# Patient Record
Sex: MALE | Race: WHITE | NOT HISPANIC OR LATINO | URBAN - METROPOLITAN AREA
[De-identification: names, ages, dates, MRNs, and addresses within clinical notes are randomized per-mention and may not be internally consistent; named-entity substitution may affect disease eponyms.]

---

## 2017-11-01 ENCOUNTER — EMERGENCY (EMERGENCY)
Facility: HOSPITAL | Age: 32
LOS: 1 days | Discharge: OTHER INSTITUTION | End: 2017-11-01
Attending: EMERGENCY MEDICINE | Admitting: EMERGENCY MEDICINE
Payer: MEDICARE

## 2017-11-01 VITALS
RESPIRATION RATE: 14 BRPM | SYSTOLIC BLOOD PRESSURE: 118 MMHG | HEART RATE: 77 BPM | TEMPERATURE: 97 F | WEIGHT: 190.04 LBS | OXYGEN SATURATION: 97 % | DIASTOLIC BLOOD PRESSURE: 82 MMHG

## 2017-11-01 PROCEDURE — 73610 X-RAY EXAM OF ANKLE: CPT | Mod: 26,RT

## 2017-11-01 PROCEDURE — 29515 APPLICATION SHORT LEG SPLINT: CPT

## 2017-11-01 PROCEDURE — 99284 EMERGENCY DEPT VISIT MOD MDM: CPT | Mod: 25

## 2017-11-01 PROCEDURE — 73620 X-RAY EXAM OF FOOT: CPT | Mod: 26,RT

## 2017-11-01 PROCEDURE — 29515 APPLICATION SHORT LEG SPLINT: CPT | Mod: RT

## 2017-11-01 PROCEDURE — 73620 X-RAY EXAM OF FOOT: CPT

## 2017-11-01 PROCEDURE — 73610 X-RAY EXAM OF ANKLE: CPT

## 2017-11-01 RX ORDER — ARIPIPRAZOLE 15 MG/1
1 TABLET ORAL
Qty: 0 | Refills: 0 | COMMUNITY

## 2017-11-01 RX ORDER — CLOZAPINE 150 MG/1
1.5 TABLET, ORALLY DISINTEGRATING ORAL
Qty: 0 | Refills: 0 | COMMUNITY

## 2017-11-01 NOTE — ED PROVIDER NOTE - OBJECTIVE STATEMENT
Pt is a 30 yo mrcp, bipolar from [facility],  per aid patient twisted right ankle yesterday while trick or treating.  pt was walking well yesterday, but today noted to be limping and with swollen foot and ankle.  pt otherwise acting normally.

## 2017-11-01 NOTE — ED ADULT NURSE NOTE - PMH
Anxiety    Bipolar 1 disorder    MR (mental retardation)  with autistic features  Psychological disorder

## 2017-11-01 NOTE — ED ADULT NURSE NOTE - OBJECTIVE STATEMENT
pt to er with swelling to right ankle and foot slight redness noted ice applied is awake and alert hx mr aide at bedsiae

## 2017-11-01 NOTE — ED PROVIDER NOTE - MEDICAL DECISION MAKING DETAILS
foot and ankle swelling and pain on ambulation sp inversion yesterday.  check xrays foot and ankle swelling and pain on ambulation sp inversion yesterday.  check xrays--no fx, splint, d/c ice, elevate fu ortho, wb as tolerated

## 2017-11-01 NOTE — ED PROVIDER NOTE - MUSCULOSKELETAL, MLM
Spine appears normal, range of motion is not limited, ttp right lateral malleolus with swelling and ttp diffusely around ankle, foot nt, sm intact, 2+ pulses, knee nt

## 2017-11-03 ENCOUNTER — APPOINTMENT (OUTPATIENT)
Dept: ORTHOPEDIC SURGERY | Facility: CLINIC | Age: 32
End: 2017-11-03

## 2017-11-03 ENCOUNTER — APPOINTMENT (OUTPATIENT)
Dept: RADIOLOGY | Facility: CLINIC | Age: 32
End: 2017-11-03

## 2017-11-06 DIAGNOSIS — F31.9 BIPOLAR DISORDER, UNSPECIFIED: ICD-10-CM

## 2017-11-06 DIAGNOSIS — S93.401A SPRAIN OF UNSPECIFIED LIGAMENT OF RIGHT ANKLE, INITIAL ENCOUNTER: ICD-10-CM
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
## 2017-11-06 DIAGNOSIS — G80.9 CEREBRAL PALSY, UNSPECIFIED: ICD-10-CM
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
## 2017-11-06 DIAGNOSIS — M25.571 PAIN IN RIGHT ANKLE AND JOINTS OF RIGHT FOOT: ICD-10-CM
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
## 2017-11-06 DIAGNOSIS — F79 UNSPECIFIED INTELLECTUAL DISABILITIES: ICD-10-CM

## 2017-11-07 ENCOUNTER — EMERGENCY (EMERGENCY)
Facility: HOSPITAL | Age: 32
LOS: 1 days | Discharge: HOME | End: 2017-11-07
Attending: EMERGENCY MEDICINE | Admitting: EMERGENCY MEDICINE
Payer: MEDICARE

## 2017-11-07 VITALS
DIASTOLIC BLOOD PRESSURE: 78 MMHG | SYSTOLIC BLOOD PRESSURE: 118 MMHG | RESPIRATION RATE: 16 BRPM | HEART RATE: 86 BPM | HEIGHT: 70 IN | OXYGEN SATURATION: 98 % | WEIGHT: 190.04 LBS | TEMPERATURE: 97 F

## 2017-11-07 DIAGNOSIS — S80.11XD CONTUSION OF RIGHT LOWER LEG, SUBSEQUENT ENCOUNTER: ICD-10-CM
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
## 2017-11-07 DIAGNOSIS — S99.911D UNSPECIFIED INJURY OF RIGHT ANKLE, SUBSEQUENT ENCOUNTER: ICD-10-CM

## 2017-11-07 DIAGNOSIS — S99.911A UNSPECIFIED INJURY OF RIGHT ANKLE, INITIAL ENCOUNTER: ICD-10-CM

## 2017-11-07 DIAGNOSIS — S80.829A BLISTER (NONTHERMAL), UNSPECIFIED LOWER LEG, INITIAL ENCOUNTER: ICD-10-CM
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
## 2017-11-07 PROCEDURE — 73630 X-RAY EXAM OF FOOT: CPT

## 2017-11-07 PROCEDURE — 73590 X-RAY EXAM OF LOWER LEG: CPT | Mod: 26,RT

## 2017-11-07 PROCEDURE — 73610 X-RAY EXAM OF ANKLE: CPT | Mod: 26,RT

## 2017-11-07 PROCEDURE — 96372 THER/PROPH/DIAG INJ SC/IM: CPT | Mod: 59

## 2017-11-07 PROCEDURE — 93971 EXTREMITY STUDY: CPT

## 2017-11-07 PROCEDURE — 73590 X-RAY EXAM OF LOWER LEG: CPT

## 2017-11-07 PROCEDURE — 99284 EMERGENCY DEPT VISIT MOD MDM: CPT | Mod: 25

## 2017-11-07 PROCEDURE — 73610 X-RAY EXAM OF ANKLE: CPT

## 2017-11-07 PROCEDURE — 29515 APPLICATION SHORT LEG SPLINT: CPT | Mod: RT

## 2017-11-07 PROCEDURE — 73630 X-RAY EXAM OF FOOT: CPT | Mod: 26,RT

## 2017-11-07 PROCEDURE — 93971 EXTREMITY STUDY: CPT | Mod: 26,RT

## 2017-11-07 PROCEDURE — 99284 EMERGENCY DEPT VISIT MOD MDM: CPT

## 2017-11-07 RX ORDER — MUPIROCIN 20 MG/G
1 OINTMENT TOPICAL
Qty: 1 | Refills: 0 | OUTPATIENT
Start: 2017-11-07 | End: 2017-11-17

## 2017-11-07 RX ORDER — PIPERACILLIN AND TAZOBACTAM 4; .5 G/20ML; G/20ML
3.38 INJECTION, POWDER, LYOPHILIZED, FOR SOLUTION INTRAVENOUS ONCE
Qty: 0 | Refills: 0 | Status: DISCONTINUED | OUTPATIENT
Start: 2017-11-07 | End: 2017-11-07

## 2017-11-07 RX ORDER — MUPIROCIN 20 MG/G
1 OINTMENT TOPICAL ONCE
Qty: 0 | Refills: 0 | Status: DISCONTINUED | OUTPATIENT
Start: 2017-11-07 | End: 2017-11-11

## 2017-11-07 RX ADMIN — Medication 2 MILLIGRAM(S): at 16:44

## 2017-11-07 RX ADMIN — Medication 1 TABLET(S): at 18:38

## 2017-11-07 RX ADMIN — PIPERACILLIN AND TAZOBACTAM 200 GRAM(S): 4; .5 INJECTION, POWDER, LYOPHILIZED, FOR SOLUTION INTRAVENOUS at 15:13

## 2017-11-07 NOTE — ED PROVIDER NOTE - SECONDARY DIAGNOSIS.
Contusion of lower extremity, right, subsequent encounter Blister (nonthermal), unspecified lower leg, initial encounter

## 2017-11-07 NOTE — ED PROVIDER NOTE - SKIN WOUND DESCRIPTION
SWOLLEN/clean/right anterior distal closed blister, with distal area of excoriation erythema, and small open blister

## 2017-11-07 NOTE — ED PROVIDER NOTE - OBJECTIVE STATEMENT
30 yo male ACLD, brought to ed by aide , states on halloween night, patient sustained, a right ankle injury, was seen in ed no fx on xrays, herbie arnold was sent to ed today because rle is more swollen and now has blisters with redness.  patient is behaving at baseline.  PMD Dr Emmanuel

## 2017-11-07 NOTE — ED PROVIDER NOTE - CARE PLAN
Principal Discharge DX:	Lower extremity edema  Secondary Diagnosis:	Contusion of lower extremity, right, subsequent encounter  Secondary Diagnosis:	Blister (nonthermal), unspecified lower leg, initial encounter

## 2017-11-07 NOTE — ED PROVIDER NOTE - UNABLE TO OBTAIN
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

## 2017-11-07 NOTE — ED PROVIDER NOTE - ATTENDING CONTRIBUTION TO CARE
30 yo male from Group home with MR seen in ER 11/01/17 for sprained ankle. Returns with group home staff today for eval of blisters and swelling of Rt ankle since yesterday. Staff say pt has been running on ankle and not resting. Had Ace wrap on it until today. Pt unable to give any hx. PE reveals moderate swelling and tenderness Rt ankle with 2 blisters on ant aspect of ankle likely from tight bandage abrasions. No cellulitis. Plan - Venous doppler, CBC, re-xray ankle.    I performed a history and physical exam of the patient and discussed their management with the advanced care provider. I reviewed the advanced care provider's note and agree with the documented findings and plan of care. My medical decision making and objective findings are found above.

## 2017-11-07 NOTE — ED PROVIDER NOTE - PROGRESS NOTE DETAILS
patient could not tolerate bw, IV placement, splint.  advised aide patient to follow up with ortho and pmd, rx for bactroban and augmentin sent to pharmacy, copy of results given

## 2017-11-10 NOTE — ED PROCEDURE NOTE - NS ED PERI VASCULAR NEG
fingers/toes warm to touch/no cyanosis of extremity/no paresthesia/capillary refill time < 2 seconds

## 2017-11-10 NOTE — ED PROCEDURE NOTE - CPROC ED POST PROC CARE GUIDE1
Elevate the injured extremity as instructed./Verbal/written post procedure instructions were given to patient/caregiver./Instructed patient/caregiver to follow-up with primary care physician./Keep the cast/splint/dressing clean and dry.

## 2017-11-10 NOTE — ED PROCEDURE NOTE - ATTENDING CONTRIBUTION TO CARE
Ankle swelling from prior injury. No acute fracture. Post splint applied.    I performed a history and physical exam of the patient and discussed their management with the advanced care provider. I reviewed the advanced care provider's note and agree with the documented findings and plan of care. My medical decision making and objective findings are found above. Rt Ankle swelling from prior injury. moderate swelling of rt ankle and foot. Rexrayed and venous doppler performed. No DVT. No acute fracture. Post splint applied.    I performed a history and physical exam of the patient and discussed their management with the advanced care provider. I reviewed the advanced care provider's note and agree with the documented findings and plan of care. My medical decision making and objective findings are found above.

## 2017-11-13 ENCOUNTER — APPOINTMENT (OUTPATIENT)
Dept: ORTHOPEDIC SURGERY | Facility: CLINIC | Age: 32
End: 2017-11-13
Payer: MEDICARE

## 2017-11-13 VITALS — HEART RATE: 72 BPM | DIASTOLIC BLOOD PRESSURE: 78 MMHG | TEMPERATURE: 97.7 F | SYSTOLIC BLOOD PRESSURE: 122 MMHG

## 2017-11-13 DIAGNOSIS — Z78.9 OTHER SPECIFIED HEALTH STATUS: ICD-10-CM

## 2017-11-13 PROCEDURE — 97760 ORTHOTIC MGMT&TRAING 1ST ENC: CPT | Mod: GP

## 2017-11-13 PROCEDURE — 99204 OFFICE O/P NEW MOD 45 MIN: CPT | Mod: 25

## 2017-11-13 RX ORDER — MUPIROCIN 2 G/100G
2 CREAM TOPICAL
Qty: 15 | Refills: 0 | Status: ACTIVE | COMMUNITY
Start: 2017-11-08

## 2017-11-13 RX ORDER — ARIPIPRAZOLE 20 MG/1
20 TABLET ORAL
Qty: 30 | Refills: 0 | Status: ACTIVE | COMMUNITY
Start: 2017-07-28

## 2017-11-13 RX ORDER — HYDROCORTISONE 1 %
12 CREAM (GRAM) TOPICAL
Qty: 400 | Refills: 0 | Status: ACTIVE | COMMUNITY
Start: 2017-09-17

## 2017-11-13 RX ORDER — CHLORHEXIDINE GLUCONATE, 0.12% ORAL RINSE 1.2 MG/ML
0.12 SOLUTION DENTAL
Qty: 473 | Refills: 0 | Status: ACTIVE | COMMUNITY
Start: 2017-09-20

## 2017-11-13 RX ORDER — AMOXICILLIN AND CLAVULANATE POTASSIUM 875; 125 MG/1; MG/1
875-125 TABLET, COATED ORAL
Qty: 20 | Refills: 0 | Status: ACTIVE | COMMUNITY
Start: 2017-11-08

## 2017-11-13 RX ORDER — CLOZAPINE 100 MG/1
100 TABLET ORAL
Qty: 56 | Refills: 0 | Status: ACTIVE | COMMUNITY
Start: 2017-07-17

## 2017-11-13 RX ORDER — NYSTATIN 100000 [USP'U]/G
100000 CREAM TOPICAL
Qty: 30 | Refills: 0 | Status: ACTIVE | COMMUNITY
Start: 2017-05-30

## 2017-11-13 RX ORDER — CLOZAPINE 25 MG/1
25 TABLET ORAL
Qty: 28 | Refills: 0 | Status: ACTIVE | COMMUNITY
Start: 2017-05-12

## 2017-11-13 RX ORDER — RISPERIDONE 3 MG/1
3 TABLET, FILM COATED ORAL
Qty: 60 | Refills: 0 | Status: ACTIVE | COMMUNITY
Start: 2017-07-03

## 2017-11-13 RX ORDER — KETOCONAZOLE 20 MG/G
2 CREAM TOPICAL
Qty: 30 | Refills: 0 | Status: ACTIVE | COMMUNITY
Start: 2017-05-11

## 2017-11-13 RX ORDER — KETOCONAZOLE 20.5 MG/ML
2 SHAMPOO, SUSPENSION TOPICAL
Qty: 120 | Refills: 0 | Status: ACTIVE | COMMUNITY
Start: 2017-05-11

## 2017-11-13 RX ORDER — BENZTROPINE MESYLATE 2 MG/1
2 TABLET ORAL
Qty: 60 | Refills: 0 | Status: ACTIVE | COMMUNITY
Start: 2017-07-28

## 2017-11-13 RX ORDER — MUPIROCIN 20 MG/G
2 OINTMENT TOPICAL
Qty: 22 | Refills: 0 | Status: ACTIVE | COMMUNITY
Start: 2017-06-08

## 2017-11-13 RX ORDER — DESMOPRESSIN ACETATE 0.2 MG/1
0.2 TABLET ORAL
Qty: 7 | Refills: 0 | Status: ACTIVE | COMMUNITY
Start: 2017-06-15

## 2017-11-13 RX ORDER — TOBRAMYCIN 3 MG/ML
0.3 SOLUTION/ DROPS OPHTHALMIC
Qty: 5 | Refills: 0 | Status: ACTIVE | COMMUNITY
Start: 2017-06-25

## 2017-11-13 RX ORDER — DIVALPROEX SODIUM 500 1/1
500 TABLET, EXTENDED RELEASE ORAL
Qty: 150 | Refills: 0 | Status: ACTIVE | COMMUNITY
Start: 2017-05-13

## 2017-11-14 ENCOUNTER — RX RENEWAL (OUTPATIENT)
Age: 32
End: 2017-11-14

## 2017-11-14 RX ORDER — BACITRACIN ZINC AND POLYMYXIN B SULFATE 500; 10000 [USP'U]/G; [USP'U]/G
500-10000 OINTMENT OPHTHALMIC
Qty: 1 | Refills: 3 | Status: ACTIVE | COMMUNITY
Start: 2017-11-14 | End: 1900-01-01

## 2017-12-18 ENCOUNTER — APPOINTMENT (OUTPATIENT)
Dept: ORTHOPEDIC SURGERY | Facility: CLINIC | Age: 32
End: 2017-12-18
Payer: MEDICARE

## 2017-12-18 DIAGNOSIS — S91.001A UNSPECIFIED OPEN WOUND, RIGHT ANKLE, INITIAL ENCOUNTER: ICD-10-CM

## 2017-12-18 DIAGNOSIS — M25.571 PAIN IN RIGHT ANKLE AND JOINTS OF RIGHT FOOT: ICD-10-CM

## 2017-12-18 PROCEDURE — 73610 X-RAY EXAM OF ANKLE: CPT | Mod: RT

## 2017-12-18 PROCEDURE — 99213 OFFICE O/P EST LOW 20 MIN: CPT

## 2020-06-15 ENCOUNTER — TRANSCRIPTION ENCOUNTER (OUTPATIENT)
Age: 35
End: 2020-06-15

## 2020-06-15 ENCOUNTER — APPOINTMENT (OUTPATIENT)
Dept: ULTRASOUND IMAGING | Facility: CLINIC | Age: 35
End: 2020-06-15
Payer: MEDICARE

## 2020-06-15 ENCOUNTER — OUTPATIENT (OUTPATIENT)
Dept: OUTPATIENT SERVICES | Facility: HOSPITAL | Age: 35
LOS: 1 days | End: 2020-06-15
Payer: MEDICARE

## 2020-06-15 DIAGNOSIS — M79.89 OTHER SPECIFIED SOFT TISSUE DISORDERS: ICD-10-CM

## 2020-06-15 PROCEDURE — 93971 EXTREMITY STUDY: CPT

## 2020-06-15 PROCEDURE — 93971 EXTREMITY STUDY: CPT | Mod: 26

## 2020-06-18 ENCOUNTER — APPOINTMENT (OUTPATIENT)
Dept: ORTHOPEDIC SURGERY | Facility: CLINIC | Age: 35
End: 2020-06-18

## 2020-06-22 ENCOUNTER — APPOINTMENT (OUTPATIENT)
Dept: ORTHOPEDIC SURGERY | Facility: CLINIC | Age: 35
End: 2020-06-22
Payer: MEDICARE

## 2020-06-22 DIAGNOSIS — S99.912A UNSPECIFIED INJURY OF LEFT ANKLE, INITIAL ENCOUNTER: ICD-10-CM

## 2020-06-22 PROCEDURE — 99214 OFFICE O/P EST MOD 30 MIN: CPT

## 2020-06-22 PROCEDURE — 73610 X-RAY EXAM OF ANKLE: CPT | Mod: LT

## 2020-06-22 RX ORDER — RISPERIDONE 1 MG/1
1 TABLET, FILM COATED ORAL
Qty: 60 | Refills: 0 | Status: ACTIVE | COMMUNITY
Start: 2020-04-11

## 2020-06-22 RX ORDER — CLOZAPINE 50 MG/1
50 TABLET ORAL
Qty: 60 | Refills: 0 | Status: ACTIVE | COMMUNITY
Start: 2020-06-12

## 2020-06-22 RX ORDER — CLOZAPINE 200 MG/1
200 TABLET ORAL
Qty: 60 | Refills: 0 | Status: ACTIVE | COMMUNITY
Start: 2020-06-12

## 2020-06-22 RX ORDER — CLONAZEPAM 0.5 MG/1
0.5 TABLET ORAL
Qty: 90 | Refills: 0 | Status: ACTIVE | COMMUNITY
Start: 2020-05-20

## 2020-06-22 NOTE — HISTORY OF PRESENT ILLNESS
[FreeTextEntry1] : Pt is 34M with two aides from the group home. One aide states he twisted his left ankle (new injury) approximately two weeks ago.  He has been walking on the ankle since the injury,  It is a witnessed injury. He was seen at Urgent care where xrays were taken and found to be negative.  He has been treated with elevation and ice.  No NSAIDs.  The patient is not complaining of pain. Not no fault related or work related injury.

## 2020-06-22 NOTE — PHYSICAL EXAM
[de-identified] : Left Ankle Physical Examination:\par \par General: Awake.  In no acute distress.  Pleasant in nature with a normal affect.  No apparent respiratory distress. \par Erythema, Warmth, Rubor: Negative\par Swelling: Positive swelling laterally.\par \par ROM:\par Limited due to pain.\par \par Tenderness to Palpation: \par 1. Lateral Malleolus: Negative\par 2. Medial Malleolus: Negative\par 3. Proximal Fibular Pain: Negative\par 4. Heel Pain: Negative\par 5. Cuboid: Negative\par 6. Navicular: Negative\par 7. Tibiotalar Joint: Negative\par 8. Subtalar Joint: Negative\par 9. Posterior Recess: Negative\par \par Tendon Pain:\par 1. Achilles: Negative\par 2. Peroneals: Negative\par 3. Posterior Tibialis: Negative\par 4. Tibialis Anterior: Negative\par \par Ligament Pain:\par 1. ATFL: Positive\par 2. CFL: Negative \par 3. PTFL: Negative\par 4. Deltoid Ligaments: Negative\par 5. Lis Franc Ligament: Negative\par \par Stability: \par 1. Anterior Drawer: Negative\par 2. Posterior Drawer: Negative\par \par Strength: 5/5 TA/GS/EHL\par \par Pulses: 2+ DP/PT Pulses\par \par Neuro: Intact motor and sensory\par \par Additional Test:\par 1. Calcaneal Squeeze Test: Negative\par 2. Syndesmosis Squeeze Test: Negative\par  [de-identified] : X-rays of the left ankle taken in office today and reviewed with the patient showed: No fractures identified.

## 2020-06-22 NOTE — DISCUSSION/SUMMARY
[de-identified] : At this point in time there are no fractures of the left ankle. He can return to the group home. He can weight-bear as tolerated. She will continue to ice and elevate. He can followup as needed.\par \par Able to return to program.  Ambulate as tolerated. WBAT\par F/U 6-8 weeks PRN. NSAIDS PRN

## 2020-06-22 NOTE — DISCUSSION/SUMMARY
[de-identified] : At this point in time there are no fractures of the left ankle. He can return to the group home. He can weight-bear as tolerated. She will continue to ice and elevate. He can followup as needed.\par \par Able to return to program.  Ambulate as tolerated. WBAT\par F/U 6-8 weeks PRN. NSAIDS PRN

## 2020-06-22 NOTE — PHYSICAL EXAM
[de-identified] : Left Ankle Physical Examination:\par \par General: Awake.  In no acute distress.  Pleasant in nature with a normal affect.  No apparent respiratory distress. \par Erythema, Warmth, Rubor: Negative\par Swelling: Positive swelling laterally.\par \par ROM:\par Limited due to pain.\par \par Tenderness to Palpation: \par 1. Lateral Malleolus: Negative\par 2. Medial Malleolus: Negative\par 3. Proximal Fibular Pain: Negative\par 4. Heel Pain: Negative\par 5. Cuboid: Negative\par 6. Navicular: Negative\par 7. Tibiotalar Joint: Negative\par 8. Subtalar Joint: Negative\par 9. Posterior Recess: Negative\par \par Tendon Pain:\par 1. Achilles: Negative\par 2. Peroneals: Negative\par 3. Posterior Tibialis: Negative\par 4. Tibialis Anterior: Negative\par \par Ligament Pain:\par 1. ATFL: Positive\par 2. CFL: Negative \par 3. PTFL: Negative\par 4. Deltoid Ligaments: Negative\par 5. Lis Franc Ligament: Negative\par \par Stability: \par 1. Anterior Drawer: Negative\par 2. Posterior Drawer: Negative\par \par Strength: 5/5 TA/GS/EHL\par \par Pulses: 2+ DP/PT Pulses\par \par Neuro: Intact motor and sensory\par \par Additional Test:\par 1. Calcaneal Squeeze Test: Negative\par 2. Syndesmosis Squeeze Test: Negative\par  [de-identified] : X-rays of the left ankle taken in office today and reviewed with the patient showed: No fractures identified.

## 2021-02-10 ENCOUNTER — EMERGENCY (EMERGENCY)
Facility: HOSPITAL | Age: 36
LOS: 1 days | Discharge: HOME | End: 2021-02-10
Attending: STUDENT IN AN ORGANIZED HEALTH CARE EDUCATION/TRAINING PROGRAM | Admitting: STUDENT IN AN ORGANIZED HEALTH CARE EDUCATION/TRAINING PROGRAM
Payer: MEDICARE

## 2021-02-10 VITALS
HEART RATE: 99 BPM | SYSTOLIC BLOOD PRESSURE: 130 MMHG | RESPIRATION RATE: 16 BRPM | TEMPERATURE: 98 F | WEIGHT: 156.09 LBS | OXYGEN SATURATION: 100 % | HEIGHT: 70 IN | DIASTOLIC BLOOD PRESSURE: 89 MMHG

## 2021-02-10 VITALS — OXYGEN SATURATION: 96 % | HEART RATE: 78 BPM | RESPIRATION RATE: 16 BRPM

## 2021-02-10 LAB
ALBUMIN SERPL ELPH-MCNC: 3.4 G/DL — SIGNIFICANT CHANGE UP (ref 3.3–5)
ALP SERPL-CCNC: 77 U/L — SIGNIFICANT CHANGE UP (ref 40–120)
ALT FLD-CCNC: 17 U/L — SIGNIFICANT CHANGE UP (ref 12–78)
ANION GAP SERPL CALC-SCNC: 4 MMOL/L — LOW (ref 5–17)
AST SERPL-CCNC: 18 U/L — SIGNIFICANT CHANGE UP (ref 15–37)
BASOPHILS # BLD AUTO: 0 K/UL — SIGNIFICANT CHANGE UP (ref 0–0.2)
BASOPHILS NFR BLD AUTO: 0 % — SIGNIFICANT CHANGE UP (ref 0–2)
BILIRUB SERPL-MCNC: 0.3 MG/DL — SIGNIFICANT CHANGE UP (ref 0.2–1.2)
BUN SERPL-MCNC: 20 MG/DL — SIGNIFICANT CHANGE UP (ref 7–23)
CALCIUM SERPL-MCNC: 9.2 MG/DL — SIGNIFICANT CHANGE UP (ref 8.5–10.1)
CHLORIDE SERPL-SCNC: 107 MMOL/L — SIGNIFICANT CHANGE UP (ref 96–108)
CO2 SERPL-SCNC: 34 MMOL/L — HIGH (ref 22–31)
CREAT SERPL-MCNC: 0.75 MG/DL — SIGNIFICANT CHANGE UP (ref 0.5–1.3)
EOSINOPHIL # BLD AUTO: 0 K/UL — SIGNIFICANT CHANGE UP (ref 0–0.5)
EOSINOPHIL NFR BLD AUTO: 0 % — SIGNIFICANT CHANGE UP (ref 0–6)
GLUCOSE SERPL-MCNC: 92 MG/DL — SIGNIFICANT CHANGE UP (ref 70–99)
HCT VFR BLD CALC: 42.1 % — SIGNIFICANT CHANGE UP (ref 39–50)
HGB BLD-MCNC: 13.3 G/DL — SIGNIFICANT CHANGE UP (ref 13–17)
LYMPHOCYTES # BLD AUTO: 1.39 K/UL — SIGNIFICANT CHANGE UP (ref 1–3.3)
LYMPHOCYTES # BLD AUTO: 25 % — SIGNIFICANT CHANGE UP (ref 13–44)
MAGNESIUM SERPL-MCNC: 2.1 MG/DL — SIGNIFICANT CHANGE UP (ref 1.6–2.6)
MANUAL SMEAR VERIFICATION: SIGNIFICANT CHANGE UP
MCHC RBC-ENTMCNC: 31.4 PG — SIGNIFICANT CHANGE UP (ref 27–34)
MCHC RBC-ENTMCNC: 31.6 GM/DL — LOW (ref 32–36)
MCV RBC AUTO: 99.3 FL — SIGNIFICANT CHANGE UP (ref 80–100)
METAMYELOCYTES # FLD: 1 % — HIGH (ref 0–0)
MONOCYTES # BLD AUTO: 0.67 K/UL — SIGNIFICANT CHANGE UP (ref 0–0.9)
MONOCYTES NFR BLD AUTO: 12 % — SIGNIFICANT CHANGE UP (ref 2–14)
MYELOCYTES NFR BLD: 2 % — HIGH (ref 0–0)
NEUTROPHILS # BLD AUTO: 2.84 K/UL — SIGNIFICANT CHANGE UP (ref 1.8–7.4)
NEUTROPHILS NFR BLD AUTO: 49 % — SIGNIFICANT CHANGE UP (ref 43–77)
NEUTS BAND # BLD: 2 % — SIGNIFICANT CHANGE UP (ref 0–8)
NRBC # BLD: 0 — SIGNIFICANT CHANGE UP
NRBC # BLD: SIGNIFICANT CHANGE UP /100 WBCS (ref 0–0)
PLAT MORPH BLD: NORMAL — SIGNIFICANT CHANGE UP
PLATELET # BLD AUTO: 169 K/UL — SIGNIFICANT CHANGE UP (ref 150–400)
POTASSIUM SERPL-MCNC: 4.6 MMOL/L — SIGNIFICANT CHANGE UP (ref 3.5–5.3)
POTASSIUM SERPL-SCNC: 4.6 MMOL/L — SIGNIFICANT CHANGE UP (ref 3.5–5.3)
PROT SERPL-MCNC: 6.9 G/DL — SIGNIFICANT CHANGE UP (ref 6–8.3)
RBC # BLD: 4.24 M/UL — SIGNIFICANT CHANGE UP (ref 4.2–5.8)
RBC # FLD: 13.5 % — SIGNIFICANT CHANGE UP (ref 10.3–14.5)
RBC BLD AUTO: NORMAL — SIGNIFICANT CHANGE UP
SODIUM SERPL-SCNC: 145 MMOL/L — SIGNIFICANT CHANGE UP (ref 135–145)
VARIANT LYMPHS # BLD: 9 % — HIGH (ref 0–6)
WBC # BLD: 5.56 K/UL — SIGNIFICANT CHANGE UP (ref 3.8–10.5)
WBC # FLD AUTO: 5.56 K/UL — SIGNIFICANT CHANGE UP (ref 3.8–10.5)

## 2021-02-10 PROCEDURE — 36415 COLL VENOUS BLD VENIPUNCTURE: CPT

## 2021-02-10 PROCEDURE — 99284 EMERGENCY DEPT VISIT MOD MDM: CPT

## 2021-02-10 PROCEDURE — 71045 X-RAY EXAM CHEST 1 VIEW: CPT

## 2021-02-10 PROCEDURE — 80053 COMPREHEN METABOLIC PANEL: CPT

## 2021-02-10 PROCEDURE — 71045 X-RAY EXAM CHEST 1 VIEW: CPT | Mod: 26

## 2021-02-10 PROCEDURE — 83735 ASSAY OF MAGNESIUM: CPT

## 2021-02-10 PROCEDURE — 99284 EMERGENCY DEPT VISIT MOD MDM: CPT | Mod: 25

## 2021-02-10 PROCEDURE — 85025 COMPLETE CBC W/AUTO DIFF WBC: CPT

## 2021-02-10 NOTE — ED PROVIDER NOTE - PHYSICAL EXAMINATION
Vital signs as available reviewed.  General:  Comfortable, no acute distress.   Head:  Normocephalic, atraumatic.  Eyes:  Conjunctiva pink, no icterus. Pupils equal, round, reactive to light.  Cardiovascular:  Regular rate, no obvious murmur.  Respiratory:  Clear to auscultation, good air entry bilaterally.  Abdomen:  Soft, non-tender.  Musculoskeletal:  No deformity or calf tenderness.  Neurologic: Asleep.  Skin:  Warm and dry.

## 2021-02-10 NOTE — ED PROVIDER NOTE - OBJECTIVE STATEMENT
35 M PMH /Tyler sent from group home for decreased mental status. Had COVID vaccine today and was agitated, then had his Klonopin 1mg AM dose and became sleepy. Per aid at bedside this is normal for him after taking his medication but the staff there was uncertain as he did not wake up and sent him for evaluation. patient unable to participate with interview.

## 2021-02-10 NOTE — ED PROVIDER NOTE - CLINICAL SUMMARY MEDICAL DECISION MAKING FREE TEXT BOX
Here with decreased mental status- likely medication side effect. Now at baseline per aids ad bedside, will DC.

## 2021-02-10 NOTE — ED PROVIDER NOTE - PATIENT PORTAL LINK FT
You can access the FollowMyHealth Patient Portal offered by [facility] by registering at the following website: [email or web address]. By joining [employer]’s FollowMyHealth portal, you will also be able to view your health information using other applications (apps) compatible with our system.

## 2021-02-10 NOTE — ED PROVIDER NOTE - PROGRESS NOTE DETAILS
patient more awake- both aids at bedside report he is at his baseline and that he frequently sleeps all day after getting his medications.

## 2021-02-10 NOTE — ED ADULT NURSE NOTE - OBJECTIVE STATEMENT
Received pt in bed sleepy.  BIBA from ACLD group home.  Pt was receiving 2 does of Moderna today.  caregiver at bedside stated he got his 8 AM meds and soon after went to sleep and was more lethargic then normal.  Denies fevers. Pt was sent to be assessed by ER.  Pt on monitor.  Pt sat 92%.  Ongoing nursing  and safety maintained.

## 2021-06-01 ENCOUNTER — EMERGENCY (EMERGENCY)
Facility: HOSPITAL | Age: 36
LOS: 1 days | Discharge: HOME | End: 2021-06-01
Attending: EMERGENCY MEDICINE
Payer: MEDICARE

## 2021-06-01 VITALS
TEMPERATURE: 98 F | HEART RATE: 103 BPM | OXYGEN SATURATION: 98 % | SYSTOLIC BLOOD PRESSURE: 111 MMHG | DIASTOLIC BLOOD PRESSURE: 80 MMHG | HEIGHT: 70 IN | RESPIRATION RATE: 20 BRPM | WEIGHT: 205.91 LBS

## 2021-06-01 LAB
ALBUMIN SERPL ELPH-MCNC: 4.2 G/DL — SIGNIFICANT CHANGE UP (ref 3.3–5.2)
ALP SERPL-CCNC: 87 U/L — SIGNIFICANT CHANGE UP (ref 40–120)
ALT FLD-CCNC: 18 U/L — SIGNIFICANT CHANGE UP
ANION GAP SERPL CALC-SCNC: 8 MMOL/L — SIGNIFICANT CHANGE UP (ref 5–17)
APPEARANCE UR: CLEAR — SIGNIFICANT CHANGE UP
AST SERPL-CCNC: 30 U/L — SIGNIFICANT CHANGE UP
BASOPHILS # BLD AUTO: 0.03 K/UL — SIGNIFICANT CHANGE UP (ref 0–0.2)
BASOPHILS NFR BLD AUTO: 0.6 % — SIGNIFICANT CHANGE UP (ref 0–2)
BILIRUB SERPL-MCNC: 0.2 MG/DL — LOW (ref 0.4–2)
BILIRUB UR-MCNC: NEGATIVE — SIGNIFICANT CHANGE UP
BUN SERPL-MCNC: 21.6 MG/DL — HIGH (ref 8–20)
CALCIUM SERPL-MCNC: 9.6 MG/DL — SIGNIFICANT CHANGE UP (ref 8.6–10.2)
CHLORIDE SERPL-SCNC: 98 MMOL/L — SIGNIFICANT CHANGE UP (ref 98–107)
CO2 SERPL-SCNC: 34 MMOL/L — HIGH (ref 22–29)
COLOR SPEC: YELLOW — SIGNIFICANT CHANGE UP
CREAT SERPL-MCNC: 0.89 MG/DL — SIGNIFICANT CHANGE UP (ref 0.5–1.3)
DIFF PNL FLD: NEGATIVE — SIGNIFICANT CHANGE UP
EOSINOPHIL # BLD AUTO: 0.15 K/UL — SIGNIFICANT CHANGE UP (ref 0–0.5)
EOSINOPHIL NFR BLD AUTO: 2.9 % — SIGNIFICANT CHANGE UP (ref 0–6)
GLUCOSE SERPL-MCNC: 97 MG/DL — SIGNIFICANT CHANGE UP (ref 70–99)
GLUCOSE UR QL: NEGATIVE MG/DL — SIGNIFICANT CHANGE UP
HCT VFR BLD CALC: 47.8 % — SIGNIFICANT CHANGE UP (ref 39–50)
HGB BLD-MCNC: 15.3 G/DL — SIGNIFICANT CHANGE UP (ref 13–17)
IMM GRANULOCYTES NFR BLD AUTO: 1.4 % — SIGNIFICANT CHANGE UP (ref 0–1.5)
KETONES UR-MCNC: ABNORMAL
LEUKOCYTE ESTERASE UR-ACNC: NEGATIVE — SIGNIFICANT CHANGE UP
LIDOCAIN IGE QN: 21 U/L — LOW (ref 22–51)
LYMPHOCYTES # BLD AUTO: 1.79 K/UL — SIGNIFICANT CHANGE UP (ref 1–3.3)
LYMPHOCYTES # BLD AUTO: 34.8 % — SIGNIFICANT CHANGE UP (ref 13–44)
MCHC RBC-ENTMCNC: 32 GM/DL — SIGNIFICANT CHANGE UP (ref 32–36)
MCHC RBC-ENTMCNC: 32.2 PG — SIGNIFICANT CHANGE UP (ref 27–34)
MCV RBC AUTO: 100.6 FL — HIGH (ref 80–100)
MONOCYTES # BLD AUTO: 0.6 K/UL — SIGNIFICANT CHANGE UP (ref 0–0.9)
MONOCYTES NFR BLD AUTO: 11.7 % — SIGNIFICANT CHANGE UP (ref 2–14)
NEUTROPHILS # BLD AUTO: 2.51 K/UL — SIGNIFICANT CHANGE UP (ref 1.8–7.4)
NEUTROPHILS NFR BLD AUTO: 48.6 % — SIGNIFICANT CHANGE UP (ref 43–77)
NITRITE UR-MCNC: NEGATIVE — SIGNIFICANT CHANGE UP
PH UR: 7 — SIGNIFICANT CHANGE UP (ref 5–8)
PLATELET # BLD AUTO: 187 K/UL — SIGNIFICANT CHANGE UP (ref 150–400)
POTASSIUM SERPL-MCNC: 4.2 MMOL/L — SIGNIFICANT CHANGE UP (ref 3.5–5.3)
POTASSIUM SERPL-SCNC: 4.2 MMOL/L — SIGNIFICANT CHANGE UP (ref 3.5–5.3)
PROT SERPL-MCNC: 7.2 G/DL — SIGNIFICANT CHANGE UP (ref 6.6–8.7)
PROT UR-MCNC: NEGATIVE MG/DL — SIGNIFICANT CHANGE UP
RBC # BLD: 4.75 M/UL — SIGNIFICANT CHANGE UP (ref 4.2–5.8)
RBC # FLD: 14 % — SIGNIFICANT CHANGE UP (ref 10.3–14.5)
SODIUM SERPL-SCNC: 140 MMOL/L — SIGNIFICANT CHANGE UP (ref 135–145)
SP GR SPEC: 1.01 — SIGNIFICANT CHANGE UP (ref 1.01–1.02)
TROPONIN T SERPL-MCNC: <0.01 NG/ML — SIGNIFICANT CHANGE UP (ref 0–0.06)
UROBILINOGEN FLD QL: 1 MG/DL
VALPROATE SERPL-MCNC: 96.1 UG/ML — SIGNIFICANT CHANGE UP (ref 50–100)
WBC # BLD: 5.15 K/UL — SIGNIFICANT CHANGE UP (ref 3.8–10.5)
WBC # FLD AUTO: 5.15 K/UL — SIGNIFICANT CHANGE UP (ref 3.8–10.5)

## 2021-06-01 PROCEDURE — 71045 X-RAY EXAM CHEST 1 VIEW: CPT | Mod: 26

## 2021-06-01 PROCEDURE — 83690 ASSAY OF LIPASE: CPT

## 2021-06-01 PROCEDURE — 70450 CT HEAD/BRAIN W/O DYE: CPT

## 2021-06-01 PROCEDURE — 80053 COMPREHEN METABOLIC PANEL: CPT

## 2021-06-01 PROCEDURE — 99284 EMERGENCY DEPT VISIT MOD MDM: CPT

## 2021-06-01 PROCEDURE — 71045 X-RAY EXAM CHEST 1 VIEW: CPT

## 2021-06-01 PROCEDURE — 84484 ASSAY OF TROPONIN QUANT: CPT

## 2021-06-01 PROCEDURE — 81003 URINALYSIS AUTO W/O SCOPE: CPT

## 2021-06-01 PROCEDURE — 80164 ASSAY DIPROPYLACETIC ACD TOT: CPT

## 2021-06-01 PROCEDURE — 72192 CT PELVIS W/O DYE: CPT

## 2021-06-01 PROCEDURE — 72192 CT PELVIS W/O DYE: CPT | Mod: 26,MA

## 2021-06-01 PROCEDURE — 72125 CT NECK SPINE W/O DYE: CPT | Mod: 26,MH

## 2021-06-01 PROCEDURE — 99284 EMERGENCY DEPT VISIT MOD MDM: CPT | Mod: 25

## 2021-06-01 PROCEDURE — 72125 CT NECK SPINE W/O DYE: CPT

## 2021-06-01 PROCEDURE — 36415 COLL VENOUS BLD VENIPUNCTURE: CPT

## 2021-06-01 PROCEDURE — 85025 COMPLETE CBC W/AUTO DIFF WBC: CPT

## 2021-06-01 PROCEDURE — 70450 CT HEAD/BRAIN W/O DYE: CPT | Mod: 26,MH

## 2021-06-01 RX ORDER — SODIUM CHLORIDE 9 MG/ML
1000 INJECTION INTRAMUSCULAR; INTRAVENOUS; SUBCUTANEOUS ONCE
Refills: 0 | Status: COMPLETED | OUTPATIENT
Start: 2021-06-01 | End: 2021-06-01

## 2021-06-01 RX ADMIN — SODIUM CHLORIDE 1000 MILLILITER(S): 9 INJECTION INTRAMUSCULAR; INTRAVENOUS; SUBCUTANEOUS at 17:39

## 2021-06-01 RX ADMIN — SODIUM CHLORIDE 1000 MILLILITER(S): 9 INJECTION INTRAMUSCULAR; INTRAVENOUS; SUBCUTANEOUS at 18:27

## 2021-06-01 NOTE — ED ADULT NURSE REASSESSMENT NOTE - NS ED NURSE REASSESS COMMENT FT1
assumed care of pt, Pt in no apparent distress at this time. Airway patent, breathing spontaneous and nonlabored. Pt A&Ox1-2 resting in stretcher. Pt no complaints at this itme

## 2021-06-01 NOTE — ED PROVIDER NOTE - PATIENT PORTAL LINK FT
You can access the FollowMyHealth Patient Portal offered by [facility] by registering at the following website: [email or web address]. By joining [employer]’s FollowMyHealth portal, you will also be able to view your health information using other applications (apps) compatible with our system.

## 2021-06-01 NOTE — ED PROVIDER NOTE - CLINICAL SUMMARY MEDICAL DECISION MAKING FREE TEXT BOX
The patient presents with a fall and CT and lab WNL and will dc back to group home and follow up with PMD

## 2021-06-01 NOTE — ED ADULT NURSE NOTE - OBJECTIVE STATEMENT
Assumed care of pt at 4:49p. Assumed care of pt at 4:49p. Pt A&ox1 c/o "increased falls over the past week" as per staff at residential home. Pt unable to communicate effectively w/ RN. RN called on-call group home [facility]ANY Palma stating pt is typically able to hold conversation and oriented to person and place. MD Valdez called to bedside for priority CT evaluation. As per RN Louie, last known well "a few hours ago". GCS 13

## 2021-06-02 VITALS
OXYGEN SATURATION: 97 % | RESPIRATION RATE: 17 BRPM | SYSTOLIC BLOOD PRESSURE: 146 MMHG | HEART RATE: 91 BPM | TEMPERATURE: 98 F | DIASTOLIC BLOOD PRESSURE: 81 MMHG

## 2021-06-25 ENCOUNTER — TRANSCRIPTION ENCOUNTER (OUTPATIENT)
Age: 36
End: 2021-06-25

## 2021-08-12 ENCOUNTER — TRANSCRIPTION ENCOUNTER (OUTPATIENT)
Age: 36
End: 2021-08-12

## 2022-01-26 ENCOUNTER — TRANSCRIPTION ENCOUNTER (OUTPATIENT)
Age: 37
End: 2022-01-26

## 2022-04-06 NOTE — ED PROVIDER NOTE - NEUROLOGICAL, MLM
Alert and nad O-L Flap Text: The defect edges were debeveled with a #15 scalpel blade.  Given the location of the defect, shape of the defect and the proximity to free margins an O-L flap was deemed most appropriate.  Using a sterile surgical marker, an appropriate advancement flap was drawn incorporating the defect and placing the expected incisions within the relaxed skin tension lines where possible.    The area thus outlined was incised deep to adipose tissue with a #15 scalpel blade.  The skin margins were undermined to an appropriate distance in all directions utilizing iris scissors.

## 2022-04-21 ENCOUNTER — TRANSCRIPTION ENCOUNTER (OUTPATIENT)
Age: 37
End: 2022-04-21

## 2022-04-21 ENCOUNTER — EMERGENCY (EMERGENCY)
Facility: HOSPITAL | Age: 37
LOS: 1 days | Discharge: HOME | End: 2022-04-21
Attending: EMERGENCY MEDICINE
Payer: MEDICARE

## 2022-04-21 VITALS
SYSTOLIC BLOOD PRESSURE: 130 MMHG | OXYGEN SATURATION: 99 % | TEMPERATURE: 98 F | HEART RATE: 86 BPM | RESPIRATION RATE: 16 BRPM | DIASTOLIC BLOOD PRESSURE: 85 MMHG

## 2022-04-21 VITALS — HEIGHT: 70 IN | WEIGHT: 197.98 LBS

## 2022-04-21 PROCEDURE — 99284 EMERGENCY DEPT VISIT MOD MDM: CPT | Mod: 25

## 2022-04-21 PROCEDURE — 73562 X-RAY EXAM OF KNEE 3: CPT

## 2022-04-21 PROCEDURE — 99284 EMERGENCY DEPT VISIT MOD MDM: CPT | Mod: FS

## 2022-04-21 PROCEDURE — 70450 CT HEAD/BRAIN W/O DYE: CPT | Mod: MD

## 2022-04-21 PROCEDURE — 70450 CT HEAD/BRAIN W/O DYE: CPT | Mod: 26,MD

## 2022-04-21 PROCEDURE — 73562 X-RAY EXAM OF KNEE 3: CPT | Mod: 26,RT

## 2022-04-21 NOTE — ED PROVIDER NOTE - PATIENT PORTAL LINK FT
You can access the FollowMyHealth Patient Portal offered by [facility] by registering at the following website: [email or web address]. By joining [employer]’s FollowMyHealth portal, you will also be able to view your health information using other applications (apps) compatible with our system.

## 2022-04-21 NOTE — ED PROVIDER NOTE - CARE PROVIDER_API CALL
Agustin Willard)  Orthopaedic Surgery  [address] [place] [address]  Phone: [phone]  Fax: [phone]  Follow Up Time:

## 2022-04-21 NOTE — ED PROVIDER NOTE - NS ED ATTENDING STATEMENT MOD
This was a shared visit with the DENISE. I reviewed and verified the documentation and independently performed the documented:

## 2022-04-21 NOTE — ED PROVIDER NOTE - OBJECTIVE STATEMENT
left sided head injury s/p fall onto bookshelf today. Pt is a 36M presents to the ED with aide who states that patient fell twice today, hitting the left side of his head on a bookshelf the second time. Aide states that fall was witnessed and patient did not lose consciousness. Aide states that patient also has right swollen knee but has been ambulating without difficulty. HPI otherwise limited secondary to patients clinical condition.

## 2022-04-21 NOTE — ED PROVIDER NOTE - PROGRESS NOTE DETAILS
CT head negative, XR negative for fx. CT head negative, XR negative for fx. Clinical suspicion for right patella tendon tear/rupture. Physical exam limited secondary to patients clinical condition and not cooperating with exam. Attempted to place knee immobilizer but patient refusing, not letting me apply. Pts aid at bedside made aware of need for orthopedic surgical follow up-- highlighted on d/c paperwork.

## 2022-04-21 NOTE — ED ADULT TRIAGE NOTE - CHIEF COMPLAINT QUOTE
c/o pain to right side of head, trip & fall, pt walks on tip toes from group home  Awake al;ert, red rei noted to forehead  sent from Urgent Care

## 2022-04-21 NOTE — ED PROVIDER NOTE - NSPTACCESSSVCSAPPTDETAILS_ED_ALL_ED_FT
Follow up with Orthopedics as needed if pain persists in right knee. FOLLOW UP WITH ORTHOPEDICS WITHIN 1 WEEK FOR CLINICAL SUSPICION OF RIGHT PATELLA TENDON INJURY.

## 2022-04-21 NOTE — ED PROVIDER NOTE - NSFOLLOWUPINSTRUCTIONS_ED_ALL_ED_FT
Contusion    A contusion is a deep bruise. Contusions are the result of a blunt injury to tissues and muscle fibers under the skin. The skin overlying the contusion may turn blue, purple, or yellow. Symptoms also include pain and swelling in the injured area.    SEEK IMMEDIATE MEDICAL CARE IF YOU HAVE ANY OF THE FOLLOWING SYMPTOMS: severe pain, numbness, tingling, pain, weakness, or skin color/temperature change in any part of your body distal to the injury. FOLLOW UP WITH ORTHOPEDICS WITHIN 1 WEEK FOR CLINICAL SUSPICION OF RIGHT PATELLA TENDON INJURY.

## 2022-04-21 NOTE — ED PROVIDER NOTE - MUSCULOSKELETAL, MLM
Spine appears normal, range of motion is not limited. No C/T/L midline TTP. + right anterior knee TTP with overlying swelling/ecchymosis.

## 2022-07-02 ENCOUNTER — EMERGENCY (EMERGENCY)
Facility: HOSPITAL | Age: 37
LOS: 1 days | Discharge: HOME | End: 2022-07-02
Attending: STUDENT IN AN ORGANIZED HEALTH CARE EDUCATION/TRAINING PROGRAM
Payer: MEDICARE

## 2022-07-02 VITALS
HEIGHT: 70 IN | HEART RATE: 100 BPM | TEMPERATURE: 99 F | RESPIRATION RATE: 16 BRPM | SYSTOLIC BLOOD PRESSURE: 124 MMHG | DIASTOLIC BLOOD PRESSURE: 84 MMHG | OXYGEN SATURATION: 95 %

## 2022-07-02 PROCEDURE — 99283 EMERGENCY DEPT VISIT LOW MDM: CPT

## 2022-07-02 PROCEDURE — 71045 X-RAY EXAM CHEST 1 VIEW: CPT

## 2022-07-02 PROCEDURE — 71045 X-RAY EXAM CHEST 1 VIEW: CPT | Mod: 26

## 2022-07-02 PROCEDURE — 99283 EMERGENCY DEPT VISIT LOW MDM: CPT | Mod: 25

## 2022-07-02 NOTE — ED ADULT TRIAGE NOTE - CHIEF COMPLAINT QUOTE
Pt. BIBA s/p choking. Pt. from group home, choked on a piece of bread, received heimlich by staff. Pt. at his baseline Pt. mental status, denies any complaints.

## 2022-07-02 NOTE — ED PROVIDER NOTE - CLINICAL SUMMARY MEDICAL DECISION MAKING FREE TEXT BOX
PT with choking episode s/p heimlich.  Pt watched in the ER and at baseline.  PT saturating well. cxr clear. aide reassured. will d/c with outpatient f/up.

## 2022-07-02 NOTE — ED PROVIDER NOTE - OBJECTIVE STATEMENT
Pt is a 35 yo M brought in by group home aide s/p choking episode.  PT was eating this evening when he choked on a piece of bread.  aide did the heimlich and bread was expelled.  Pt is acting normally since then.

## 2022-07-02 NOTE — ED PROVIDER NOTE - PATIENT PORTAL LINK FT
You can access the FollowMyHealth Patient Portal offered by [facility] by registering at the following website: [email or web address]. By joining [employer]’s FollowMyHealth portal, you will also be able to view your health information using other applications (apps) compatible with our system.

## 2022-07-02 NOTE — ED PROVIDER NOTE - PHYSICAL EXAMINATION
Constitutional - well-developed.   Head - NCAT. Airway patent. oropharynx clear  Eyes - PERRL.   CV - RRR. no murmur. no edema.   Pulm - CTAB.   Abd - soft, nt. no rebound. no guarding.   Neuro - A&Ox3.   Skin - No rash. .  MSK - normal ROM.

## 2022-07-13 ENCOUNTER — EMERGENCY (EMERGENCY)
Facility: HOSPITAL | Age: 37
LOS: 1 days | Discharge: HOME | End: 2022-07-13
Attending: STUDENT IN AN ORGANIZED HEALTH CARE EDUCATION/TRAINING PROGRAM
Payer: MEDICARE

## 2022-07-13 VITALS
WEIGHT: 210.1 LBS | OXYGEN SATURATION: 97 % | SYSTOLIC BLOOD PRESSURE: 104 MMHG | HEART RATE: 90 BPM | DIASTOLIC BLOOD PRESSURE: 78 MMHG | HEIGHT: 70 IN | RESPIRATION RATE: 18 BRPM | TEMPERATURE: 99 F

## 2022-07-13 VITALS
RESPIRATION RATE: 18 BRPM | TEMPERATURE: 99 F | SYSTOLIC BLOOD PRESSURE: 129 MMHG | OXYGEN SATURATION: 97 % | DIASTOLIC BLOOD PRESSURE: 75 MMHG | HEART RATE: 74 BPM

## 2022-07-13 LAB
ANION GAP SERPL CALC-SCNC: 6 MMOL/L — SIGNIFICANT CHANGE UP (ref 5–17)
APAP SERPL-MCNC: <3 UG/ML — LOW (ref 10–26)
APPEARANCE UR: CLEAR — SIGNIFICANT CHANGE UP
BASOPHILS # BLD AUTO: 0.04 K/UL — SIGNIFICANT CHANGE UP (ref 0–0.2)
BASOPHILS NFR BLD AUTO: 1 % — SIGNIFICANT CHANGE UP (ref 0–2)
BILIRUB UR-MCNC: NEGATIVE — SIGNIFICANT CHANGE UP
BUN SERPL-MCNC: 12.2 MG/DL — SIGNIFICANT CHANGE UP (ref 8–20)
CALCIUM SERPL-MCNC: 10.1 MG/DL — SIGNIFICANT CHANGE UP (ref 8.6–10.2)
CHLORIDE SERPL-SCNC: 99 MMOL/L — SIGNIFICANT CHANGE UP (ref 98–107)
CO2 SERPL-SCNC: 36 MMOL/L — HIGH (ref 22–29)
COLOR SPEC: YELLOW — SIGNIFICANT CHANGE UP
CREAT SERPL-MCNC: 0.74 MG/DL — SIGNIFICANT CHANGE UP (ref 0.5–1.3)
DIFF PNL FLD: NEGATIVE — SIGNIFICANT CHANGE UP
EGFR: 119 ML/MIN/1.73M2 — SIGNIFICANT CHANGE UP
EOSINOPHIL # BLD AUTO: 0.11 K/UL — SIGNIFICANT CHANGE UP (ref 0–0.5)
EOSINOPHIL NFR BLD AUTO: 2.7 % — SIGNIFICANT CHANGE UP (ref 0–6)
ETHANOL SERPL-MCNC: <10 MG/DL — SIGNIFICANT CHANGE UP (ref 0–9)
GLUCOSE SERPL-MCNC: 80 MG/DL — SIGNIFICANT CHANGE UP (ref 70–99)
GLUCOSE UR QL: NEGATIVE MG/DL — SIGNIFICANT CHANGE UP
HCT VFR BLD CALC: 44.9 % — SIGNIFICANT CHANGE UP (ref 39–50)
HGB BLD-MCNC: 14.5 G/DL — SIGNIFICANT CHANGE UP (ref 13–17)
IMM GRANULOCYTES NFR BLD AUTO: 1.2 % — SIGNIFICANT CHANGE UP (ref 0–1.5)
KETONES UR-MCNC: NEGATIVE — SIGNIFICANT CHANGE UP
LEUKOCYTE ESTERASE UR-ACNC: NEGATIVE — SIGNIFICANT CHANGE UP
LYMPHOCYTES # BLD AUTO: 1.44 K/UL — SIGNIFICANT CHANGE UP (ref 1–3.3)
LYMPHOCYTES # BLD AUTO: 35.6 % — SIGNIFICANT CHANGE UP (ref 13–44)
MCHC RBC-ENTMCNC: 31.5 PG — SIGNIFICANT CHANGE UP (ref 27–34)
MCHC RBC-ENTMCNC: 32.3 GM/DL — SIGNIFICANT CHANGE UP (ref 32–36)
MCV RBC AUTO: 97.6 FL — SIGNIFICANT CHANGE UP (ref 80–100)
MONOCYTES # BLD AUTO: 0.41 K/UL — SIGNIFICANT CHANGE UP (ref 0–0.9)
MONOCYTES NFR BLD AUTO: 10.1 % — SIGNIFICANT CHANGE UP (ref 2–14)
NEUTROPHILS # BLD AUTO: 1.99 K/UL — SIGNIFICANT CHANGE UP (ref 1.8–7.4)
NEUTROPHILS NFR BLD AUTO: 49.4 % — SIGNIFICANT CHANGE UP (ref 43–77)
NITRITE UR-MCNC: NEGATIVE — SIGNIFICANT CHANGE UP
PH UR: 6 — SIGNIFICANT CHANGE UP (ref 5–8)
PLATELET # BLD AUTO: 281 K/UL — SIGNIFICANT CHANGE UP (ref 150–400)
POTASSIUM SERPL-MCNC: 4.9 MMOL/L — SIGNIFICANT CHANGE UP (ref 3.5–5.3)
POTASSIUM SERPL-SCNC: 4.9 MMOL/L — SIGNIFICANT CHANGE UP (ref 3.5–5.3)
PROT UR-MCNC: NEGATIVE — SIGNIFICANT CHANGE UP
RBC # BLD: 4.6 M/UL — SIGNIFICANT CHANGE UP (ref 4.2–5.8)
RBC # FLD: 13 % — SIGNIFICANT CHANGE UP (ref 10.3–14.5)
SALICYLATES SERPL-MCNC: <0.6 MG/DL — LOW (ref 10–20)
SODIUM SERPL-SCNC: 141 MMOL/L — SIGNIFICANT CHANGE UP (ref 135–145)
SP GR SPEC: 1.02 — SIGNIFICANT CHANGE UP (ref 1.01–1.02)
UROBILINOGEN FLD QL: NEGATIVE MG/DL — SIGNIFICANT CHANGE UP
VALPROATE SERPL-MCNC: 79 UG/ML — SIGNIFICANT CHANGE UP (ref 50–100)
WBC # BLD: 4.04 K/UL — SIGNIFICANT CHANGE UP (ref 3.8–10.5)
WBC # FLD AUTO: 4.04 K/UL — SIGNIFICANT CHANGE UP (ref 3.8–10.5)

## 2022-07-13 PROCEDURE — 81003 URINALYSIS AUTO W/O SCOPE: CPT

## 2022-07-13 PROCEDURE — 99284 EMERGENCY DEPT VISIT MOD MDM: CPT | Mod: 25

## 2022-07-13 PROCEDURE — 99284 EMERGENCY DEPT VISIT MOD MDM: CPT

## 2022-07-13 PROCEDURE — 80164 ASSAY DIPROPYLACETIC ACD TOT: CPT

## 2022-07-13 PROCEDURE — 85025 COMPLETE CBC W/AUTO DIFF WBC: CPT

## 2022-07-13 PROCEDURE — G1004: CPT

## 2022-07-13 PROCEDURE — 87086 URINE CULTURE/COLONY COUNT: CPT

## 2022-07-13 PROCEDURE — 80307 DRUG TEST PRSMV CHEM ANLYZR: CPT

## 2022-07-13 PROCEDURE — 70450 CT HEAD/BRAIN W/O DYE: CPT | Mod: 26,MG

## 2022-07-13 PROCEDURE — 36415 COLL VENOUS BLD VENIPUNCTURE: CPT

## 2022-07-13 PROCEDURE — 70450 CT HEAD/BRAIN W/O DYE: CPT | Mod: MG

## 2022-07-13 PROCEDURE — 80048 BASIC METABOLIC PNL TOTAL CA: CPT

## 2022-07-13 RX ORDER — SODIUM CHLORIDE 9 MG/ML
1000 INJECTION INTRAMUSCULAR; INTRAVENOUS; SUBCUTANEOUS ONCE
Refills: 0 | Status: DISCONTINUED | OUTPATIENT
Start: 2022-07-13 | End: 2022-07-13

## 2022-07-13 NOTE — ED ADULT NURSE NOTE - OBJECTIVE STATEMENT
pt with no complaints of pain, even and unlabored resps, sent in because he fell asleep and was "difficult" to arouse. pt is responsive to verbal on arrival, offers no reports of discomforts, old ecchymosis noted to right orbit, here and eval last week for fall. aid at bedside, pt from group home. SHERRY with strength and purpose, ambulatory.

## 2022-07-13 NOTE — ED PROVIDER NOTE - OBJECTIVE STATEMENT
patient from group home brought in by aide who knows patient for 2 years. states patient has regular neuro checks and sleeps during day due to med side effects at baseline. today patient permitted to go to sleep after breakfast instead of group activity because he had scheduled 10am appt. then when difficult to wake he was sent to hospital per protocol. aide states this is frequent occurrence and no change from baseline behavior. when awake he is playing baseball active, oriented x 2 but walks with head down often in sleeplike state. frequent bumping into walls due to this behavior

## 2022-07-13 NOTE — ED PROVIDER NOTE - NSFOLLOWUPINSTRUCTIONS_ED_ALL_ED_FT
patient cleared to return to program  all labs and ct head included in discharge papers  please discuss with pcp role of neuro checks when patient taking medications with side effects of somnolence during morning hours

## 2022-07-13 NOTE — ED PROVIDER NOTE - PHYSICAL EXAMINATION
sleeping, withdraws to noxious stimuli  lcta  rrr  abdomen soft nt nd active bs   no testicular swelling no erythema no discharge  ext right knee swelling prepatellar bursa sleeping, withdraws to noxious stimuli  ecchymosis right periorbital region nontender to palpation  no c spine ttp  lcta  rrr  abdomen soft nt nd active bs   no testicular swelling no erythema no discharge  ext right knee swelling prepatellar bursa

## 2022-07-13 NOTE — ED ADULT TRIAGE NOTE - CHIEF COMPLAINT QUOTE
presents to ed with lethargy. pt is group home patient at [address] [place] [address]. no visual s/s of discomfort.

## 2022-07-13 NOTE — ED PROVIDER NOTE - RESPIRATORY NEGATIVE STATEMENT, MLM
no chest pain, no cough, and no shortness of breath. Priority modifications/Nutrition relationship to health/disease/Purpose of the nutrition education

## 2022-07-13 NOTE — ED ADULT NURSE NOTE - CHIEF COMPLAINT QUOTE
presents to ed with lethargy. pt is group home patient at [address] [place] [address]. no visual s/s of discomfort.

## 2022-07-13 NOTE — ED PROVIDER NOTE - PATIENT PORTAL LINK FT
You can access the FollowMyHealth Patient Portal offered by [facility] by registering at the following website: [email or web address]. By joining [employer]’s FollowMyHealth portal, you will also be able to view your health information using other applications (apps) compatible with our system.

## 2022-07-15 LAB
CULTURE RESULTS: SIGNIFICANT CHANGE UP
SPECIMEN SOURCE: SIGNIFICANT CHANGE UP

## 2022-09-09 ENCOUNTER — EMERGENCY (EMERGENCY)
Facility: HOSPITAL | Age: 37
LOS: 1 days | Discharge: HOME | End: 2022-09-09
Attending: EMERGENCY MEDICINE
Payer: MEDICARE

## 2022-09-09 VITALS
HEART RATE: 66 BPM | RESPIRATION RATE: 18 BRPM | OXYGEN SATURATION: 97 % | WEIGHT: 154.98 LBS | DIASTOLIC BLOOD PRESSURE: 75 MMHG | TEMPERATURE: 98 F | SYSTOLIC BLOOD PRESSURE: 112 MMHG | HEIGHT: 70 IN

## 2022-09-09 VITALS
RESPIRATION RATE: 18 BRPM | DIASTOLIC BLOOD PRESSURE: 78 MMHG | OXYGEN SATURATION: 98 % | SYSTOLIC BLOOD PRESSURE: 122 MMHG | TEMPERATURE: 98 F | HEART RATE: 68 BPM

## 2022-09-09 PROCEDURE — 99284 EMERGENCY DEPT VISIT MOD MDM: CPT | Mod: 25

## 2022-09-09 PROCEDURE — 96372 THER/PROPH/DIAG INJ SC/IM: CPT | Mod: XU

## 2022-09-09 PROCEDURE — 72125 CT NECK SPINE W/O DYE: CPT | Mod: 26,MA

## 2022-09-09 PROCEDURE — 90715 TDAP VACCINE 7 YRS/> IM: CPT

## 2022-09-09 PROCEDURE — 96374 THER/PROPH/DIAG INJ IV PUSH: CPT | Mod: XU

## 2022-09-09 PROCEDURE — 90471 IMMUNIZATION ADMIN: CPT

## 2022-09-09 PROCEDURE — 70450 CT HEAD/BRAIN W/O DYE: CPT | Mod: MA

## 2022-09-09 PROCEDURE — 12053 INTMD RPR FACE/MM 5.1-7.5 CM: CPT

## 2022-09-09 PROCEDURE — 72125 CT NECK SPINE W/O DYE: CPT | Mod: MA

## 2022-09-09 PROCEDURE — 70450 CT HEAD/BRAIN W/O DYE: CPT | Mod: 26,MA

## 2022-09-09 PROCEDURE — 12014 RPR F/E/E/N/L/M 5.1-7.5 CM: CPT

## 2022-09-09 RX ORDER — CEPHALEXIN 500 MG
1 CAPSULE ORAL
Qty: 28 | Refills: 0
Start: 2022-09-09 | End: 2022-09-15

## 2022-09-09 RX ORDER — MIDAZOLAM HYDROCHLORIDE 1 MG/ML
5 INJECTION, SOLUTION INTRAMUSCULAR; INTRAVENOUS ONCE
Refills: 0 | Status: DISCONTINUED | OUTPATIENT
Start: 2022-09-09 | End: 2022-09-09

## 2022-09-09 RX ORDER — TETANUS TOXOID, REDUCED DIPHTHERIA TOXOID AND ACELLULAR PERTUSSIS VACCINE, ADSORBED 5; 2.5; 8; 8; 2.5 [IU]/.5ML; [IU]/.5ML; UG/.5ML; UG/.5ML; UG/.5ML
0.5 SUSPENSION INTRAMUSCULAR ONCE
Refills: 0 | Status: COMPLETED | OUTPATIENT
Start: 2022-09-09 | End: 2022-09-09

## 2022-09-09 RX ORDER — CEFAZOLIN SODIUM 1 G
2000 VIAL (EA) INJECTION ONCE
Refills: 0 | Status: COMPLETED | OUTPATIENT
Start: 2022-09-09 | End: 2022-09-09

## 2022-09-09 RX ORDER — KETAMINE HYDROCHLORIDE 100 MG/ML
100 INJECTION INTRAMUSCULAR; INTRAVENOUS ONCE
Refills: 0 | Status: DISCONTINUED | OUTPATIENT
Start: 2022-09-09 | End: 2022-09-09

## 2022-09-09 RX ADMIN — Medication 100 MILLIGRAM(S): at 15:30

## 2022-09-09 RX ADMIN — KETAMINE HYDROCHLORIDE 100 MILLIGRAM(S): 100 INJECTION INTRAMUSCULAR; INTRAVENOUS at 15:00

## 2022-09-09 RX ADMIN — TETANUS TOXOID, REDUCED DIPHTHERIA TOXOID AND ACELLULAR PERTUSSIS VACCINE, ADSORBED 0.5 MILLILITER(S): 5; 2.5; 8; 8; 2.5 SUSPENSION INTRAMUSCULAR at 12:20

## 2022-09-09 RX ADMIN — MIDAZOLAM HYDROCHLORIDE 5 MILLIGRAM(S): 1 INJECTION, SOLUTION INTRAMUSCULAR; INTRAVENOUS at 15:43

## 2022-09-09 RX ADMIN — MIDAZOLAM HYDROCHLORIDE 5 MILLIGRAM(S): 1 INJECTION, SOLUTION INTRAMUSCULAR; INTRAVENOUS at 15:42

## 2022-09-09 NOTE — ED ADULT NURSE REASSESSMENT NOTE - NS ED NURSE REASSESS COMMENT FT1
Assumed care of pt at 19:15 as stated in report from BETHANY Wilks Charting as noted. no s/s of pain/discomfort, no s/s of CP/SOB. Updated on the plan of care. pt with family member at bedside Call bell within reach, bed locked in lowest position. IV site flushed w/ NS. No redness, swelling or pain noted to site. No signs of acute distress noted, safety maintained. Pt awaiting to be back to baseline to be DC
pt. asleep resp even and unlabored, airway intact. responds to painful stimuli. changed and repositioned. pending dispo

## 2022-09-09 NOTE — ED PROVIDER NOTE - OBJECTIVE STATEMENT
37 y/o M with PMH intellectual disability, seizure disorder presents from group home s/p mechanical fall this morning, striking his forehead and sustaining a laceration. Per aide at bedside, the patient is at his baseline mental status, no LOC. No vomiting or change in mental status since the fall. No allergies to medications, unsure of last tetanus. Patient cannot provide further  history due to intellectual disability.

## 2022-09-09 NOTE — ED ADULT NURSE NOTE - OBJECTIVE STATEMENT
c/o laceration to head, no active bleeding noted,no other deformities noted. pt denies pain. no loc no known thinners. pt answers  questions appropriately. bleeding controlled. as per aid pt mental status is at baseline

## 2022-09-09 NOTE — ED PROVIDER NOTE - PATIENT PORTAL LINK FT
You can access the FollowMyHealth Patient Portal offered by [facility] by registering at the following website: [email or web address]. By joining [employer]’s FollowMyHealth portal, you will also be able to view your health information using other applications (apps) compatible with our system.

## 2022-09-09 NOTE — ED PROVIDER NOTE - PHYSICAL EXAMINATION
Const: Somnolent, but arousable.  In no acute distress. Well appearing.  HEENT: 4 cm vertical laceration on forehead, bleeding controlled. Moist mucous membranes.  Eyes: No scleral icterus. EOMI.  Neck:. Soft and supple. Full ROM without pain.  Cardiac: Regular rate and regular rhythm. +S1/S2. No murmurs. Peripheral pulses 2+ and symmetric. No LE edema.  Resp: No evidence of respiratory distress.  Abd: Soft, non-tender, non-distended. Normal bowel sounds in all 4 quadrants. No guarding or rebound.  Skin: No other lacerations. No rashes, abrasions or lacerations.  Neuro: At baseline mental status. Moves all extremities symmetrically.

## 2022-09-09 NOTE — ED PROVIDER NOTE - CLINICAL SUMMARY MEDICAL DECISION MAKING FREE TEXT BOX
35 y/o M presents from fall from standing, sustained laceration to forehead, will CT head, update tetanus, repair laceration.

## 2022-09-09 NOTE — ED PROVIDER NOTE - PROGRESS NOTE DETAILS
labs reviewed. CT negative. patient at baseline mental status. aide at bedside will arrange transportation back to facility. -DO Dmitry

## 2022-09-09 NOTE — ED ADULT TRIAGE NOTE - HISTORY OF COVID-19 VACCINATION
----- Message from BETY Washington sent at 3/28/2017  5:31 PM EDT -----  Patient needs to stop potassium and repeat BMP in 1 week.  Renal function is very low.  She needs to see nephrology asap.  She also has low hemoglobin and needs to see GI for colonoscopy if has not had one.  If she refuses that then at least cologuard  
Pt made aware of all. Pt is already set up for the cologaurd and she agreed if anything came back bad she would do full colonoscopy.  
Vaccine status unknown

## 2022-09-09 NOTE — ED ADULT TRIAGE NOTE - CHIEF COMPLAINT QUOTE
pt BIBA from group home with lac to head, pt at baseline mental status, no loc no known thinners. pt answers some questions appropriately. EDWARDS, bleeding controlled. states he fell last night.

## 2022-10-27 ENCOUNTER — NON-APPOINTMENT (OUTPATIENT)
Age: 37
End: 2022-10-27

## 2023-01-26 ENCOUNTER — NON-APPOINTMENT (OUTPATIENT)
Age: 38
End: 2023-01-26

## 2023-03-23 ENCOUNTER — EMERGENCY (EMERGENCY)
Facility: HOSPITAL | Age: 38
LOS: 1 days | Discharge: HOME | End: 2023-03-23
Attending: STUDENT IN AN ORGANIZED HEALTH CARE EDUCATION/TRAINING PROGRAM
Payer: MEDICARE

## 2023-03-23 VITALS
RESPIRATION RATE: 18 BRPM | OXYGEN SATURATION: 96 % | HEART RATE: 74 BPM | DIASTOLIC BLOOD PRESSURE: 84 MMHG | SYSTOLIC BLOOD PRESSURE: 122 MMHG

## 2023-03-23 VITALS — TEMPERATURE: 96 F

## 2023-03-23 PROCEDURE — 99282 EMERGENCY DEPT VISIT SF MDM: CPT

## 2023-03-23 PROCEDURE — 99284 EMERGENCY DEPT VISIT MOD MDM: CPT | Mod: GC

## 2023-03-23 NOTE — ED PROVIDER NOTE - UNABLE TO OBTAIN
"Chief Complaint   Patient presents with     Hypertension     Asthma       Initial Breastfeeding? No Estimated body mass index is 23.52 kg/(m^2) as calculated from the following:    Height as of 10/26/16: 5' 4\" (1.626 m).    Weight as of 10/17/17: 137 lb (62.1 kg).      Health Maintenance that is potentially due pending provider review:  Mammogram, Colonoscopy/FIT, PHQ9, GAD7 and ACT    Possibly completing today per provider review.    Is there anyone who you would like to be able to receive your results? No  If yes have patient fill out WILLIAM    " severe mental delay Unresponsive

## 2023-03-23 NOTE — ED PROVIDER NOTE - OBJECTIVE STATEMENT
38 y/o M with PMH intellectual disability, seizure disorder presents from group home after fall. Patient had a witnessed fall today around 2 PM after getting up from bed where he tripped and had a head strike on the door frame.  Group home employee reports that the patient did not have LOC and is not currently on blood thinners.  He also reports that he is currently acting at baseline mental status.  ROS unable to obtain because of severe mental delay.

## 2023-03-23 NOTE — ED PROVIDER NOTE - ATTENDING CONTRIBUTION TO CARE
37y M w/ hx intellectual disability, seizures, presents from group home after mechanical fall. Per aide, pt was running around prior to arrival when he tripped and hit his head against a door frame. Sustained laceration to mid forehead. No LOC, no vomiting. Pt acting at baseline mental status. No blood thinners. Tetanus UTD. On exam, pt well appearing and in no distress. +1.5 cm superficial laceration over mid forehead. No facial bone tenderness, no cervical spine tenderness. No nasal septal hematoma. +Dried blood around mouth, no active bleeding. No focal neuro deficits. Laceration repaired with steri strips. Medically stable for discharge with outpatient f/u.

## 2023-03-23 NOTE — ED ADULT NURSE NOTE - NSFALLRSKPSTHSTOCCUR_ED_ALL_ED
After Visit Summary   9/20/2018    Jalil Hernandez    MRN: [id number]           Patient Information     Date Of Birth          2004        Visit Information        Provider Department      9/20/2018 3:00 PM Marine Lizarraga MD; LANGUAGE [facility]        Today's Diagnoses     Retinal tear of left eye - Left Eye    -  1       Follow-ups after your visit        Follow-up notes from your care team     Return in about 2 weeks (around 10/4/2018) for DFE.      Who to contact     Please call your clinic at [phone] to:    Ask questions about your health    Make or cancel appointments    Discuss your medicines    Learn about your test results    Speak to your doctor            Additional Information About Your Visit        MyChart Information     for; to (do) [employer] is an electronic gateway that provides easy, online access to your medical records. With [employer], you can request a clinic appointment, read your test results, renew a prescription or communicate with your care team.     To sign up for [employer], please contact your [facility] or call [phone] for assistance.           Care EveryWhere ID     This is your Care EveryWhere ID. This could be used by other organizations to access your [place] medical records  [id number]         Blood Pressure from Last 3 Encounters:   03/29/18 115/64   10/01/17 130/71   02/07/17 98/57    Weight from Last 3 Encounters:   03/29/18 58.1 kg (128 lb) (40 %)*   10/01/17 53.9 kg (118 lb 12.8 oz) (32 %)*   02/07/17 49 kg (108 lb) (25 %)*     * Growth percentiles are based on CDC 2-20 Years data.              We Performed the Following     Retinopexy Laser Prophylaxis Break (s) OS (left eye)        Primary Care Provider Office Phone # Fax #    Cayden Parsons [phone]       [address] [address]        Equal Access to Services     REGINO SALINAS : angeles Shipley  jarad beckettsamantha sudixon farah. So [facility] [phone].    ATENCIÓN: Si epi tapia, tiene a negron disposición servicios gratuitos de asistencia lingüística. Eron al [phone].    We comply with applicable federal civil rights laws and [place] laws. We do not discriminate on the basis of race, color, national origin, age, disability, sex, sexual orientation, or gender identity.            Thank you!     Thank you for choosing EYE CLINIC  for your care. Our goal is always to provide you with excellent care. Hearing back from our patients is one way we can continue to improve our services. Please take a few minutes to complete the written survey that you may receive in the mail after your visit with us. Thank you!             Your Updated Medication List - Protect others around you: Learn how to safely use, store and throw away your medicines at www.disposemymeds.org.          This list is accurate as of 9/20/18  4:35 PM.  Always use your most recent med list.                   Brand Name Dispense Instructions for use Diagnosis    acetaminophen 325 MG tablet    TYLENOL    100 tablet    Take 2 tablets (650 mg) by mouth every 6 hours as needed for mild pain    AOM (acute otitis media), left       benzoyl peroxide-erythromycin topical gel    BENZAMYCIN    60 g    Apply topically 2 times daily    Acne vulgaris       ibuprofen 100 MG/5ML suspension    CHILD IBUPROFEN    237 mL    Take 15 mLs by mouth every 8 hours as needed for fever.    Fever, unspecified       minocycline 50 MG capsule    MINOCIN/DYNACIN    180 capsule    Take 1 capsule (50 mg) by mouth 2 times daily    Acne vulgaris          Single Mechanical/Accidental Fall

## 2023-03-23 NOTE — ED ADULT NURSE NOTE - OBJECTIVE STATEMENT
Pt in no apparent distress at this time. Airway patent, breathing spontaneous and nonlabored. Pt A&Ox1 resting in stretcher. Pt BIBA s/p witnessed fall hitting head on door jamb, no loc. lac to nasal bridge bleeding controlled.  pt baseline mental status per aid at beside.      ,

## 2023-03-23 NOTE — ED PROVIDER NOTE - PROGRESS NOTE DETAILS
Manning: Laceration closed with steri strips. Pt in no distress, acting at baseline mental status as per aide. Will forgo imaging at this time. Stable for discharge.

## 2023-03-23 NOTE — ED PROVIDER NOTE - NSFOLLOWUPINSTRUCTIONS_ED_ALL_ED_FT
- Wound was closed with steri strips, which will fall off on their own.  - Keep wound clean and dry.  - Follow up with primary care doctor.  - Return to the emergency room for any new or worsening issues, including vomiting, change in mental status, seizures.    ==================================================    Head Laceration    WHAT YOU NEED TO KNOW:    A laceration happens when the skin and other tissues are torn. Head lacerations usually bleed more than other types of lacerations.    DISCHARGE INSTRUCTIONS:    Have someone call your local emergency number (911 in the [place]) if:   •You cannot be woken.    •Your mood or behavior changes.    Call your doctor if:   •The area is red, warm, or has pus coming from it.    •The area begins to bleed and does not stop after 15 minutes of pressure.    •You have questions or concerns about your condition or care.    Rest. Some activities may cause too much pressure in your head. Your laceration may begin to bleed.    Ice the area. Apply ice to the area for 15 to 20 minutes every hour or as directed. Use an ice pack, or put crushed ice in a plastic bag. Cover it with a towel before you apply it. Ice helps prevent tissue damage and decreases swelling and pain.    Keep the area clean and dry. Your healthcare provider will tell you how to clean the area.    Check the area every day for signs of infection. Signs of infection may include redness, pus, and warmth around the area. Call your doctor if you find any signs of infection.    Do not smoke. Nicotine and other chemicals in cigarettes and cigars can prevent your wound from healing. Ask your healthcare provider for information if you currently smoke and need help to quit. E-cigarettes or smokeless tobacco still contain nicotine. Talk to your healthcare provider before you use these products.    Follow up with your doctor as directed: Write down your questions so you remember to ask them during your visits.

## 2023-03-23 NOTE — ED ADULT NURSE NOTE - CHIEF COMPLAINT QUOTE
BIBEMS from [facility] s/p witnessed fall from standing height. No LOC as per witness. No known anticoagulation medications. Pt with intellectual disability is currently at baseline, oriented to self. Laceration noted to forehead, some dry blood noted in mouth. Aide from [facility] at the bedside.

## 2023-03-23 NOTE — ED PROVIDER NOTE - CLINICAL SUMMARY MEDICAL DECISION MAKING FREE TEXT BOX
38 y/o M with PMH intellectual disability, seizure disorder presents from group home after fall.  Patient at baseline mental status. CT head and lac repair. Tetanus last given on Sept2022 for similar fall with laceration.

## 2023-03-23 NOTE — ED PROVIDER NOTE - PHYSICAL EXAMINATION
Gen: no acute distress  Head: normocephalic, 1.5cm laceration to glabella   EENT: EOMI, PERRL  Lung: no increased work of breathing, CTABL  CV: normal s1/s2, rrr, 2+ radial pulses b/l  Abd: soft, non-tender, non-distended, no rebound tenderness or guarding  MSK: no visible deformities, full range of motion in all 4 extremities  Neuro: baseline mental status per aide; moving all extremities spontaneous; responsive to verbal stimuli

## 2023-03-23 NOTE — ED ADULT TRIAGE NOTE - CHIEF COMPLAINT QUOTE
BIBEMS from [facility] s/p witnessed fall from standing height. No LOC as per witness. No known anticoagulation medications. Pt with intellectual disability is currently at baseline, oriented to self. Laceration noted to forehead, some dry blood noted in mouth. Aide from [facility] at the bedside.

## 2023-09-20 ENCOUNTER — NON-APPOINTMENT (OUTPATIENT)
Age: 38
End: 2023-09-20

## 2025-03-07 ENCOUNTER — INPATIENT (INPATIENT)
Facility: HOSPITAL | Age: 40
LOS: 3 days | Discharge: NURSING, REHAB OR LONG-TERM CARE | DRG: 189 | End: 2025-03-11
Attending: STUDENT IN AN ORGANIZED HEALTH CARE EDUCATION/TRAINING PROGRAM | Admitting: HOSPITALIST
Payer: MEDICARE

## 2025-03-07 VITALS
DIASTOLIC BLOOD PRESSURE: 73 MMHG | TEMPERATURE: 98 F | RESPIRATION RATE: 17 BRPM | OXYGEN SATURATION: 97 % | HEART RATE: 77 BPM | SYSTOLIC BLOOD PRESSURE: 111 MMHG

## 2025-03-07 DIAGNOSIS — J96.02 ACUTE RESPIRATORY FAILURE WITH HYPERCAPNIA: ICD-10-CM

## 2025-03-07 DIAGNOSIS — J96.01 ACUTE RESPIRATORY FAILURE WITH HYPOXIA: ICD-10-CM

## 2025-03-07 DIAGNOSIS — F99 MENTAL DISORDER, NOT OTHERWISE SPECIFIED: ICD-10-CM

## 2025-03-07 PROBLEM — G40.909 EPILEPSY, UNSPECIFIED, NOT INTRACTABLE, WITHOUT STATUS EPILEPTICUS: Chronic | Status: ACTIVE | Noted: 2022-09-09

## 2025-03-07 PROBLEM — F79 UNSPECIFIED INTELLECTUAL DISABILITIES: Chronic | Status: ACTIVE | Noted: 2022-07-13

## 2025-03-07 LAB
ALBUMIN SERPL ELPH-MCNC: 4 G/DL — SIGNIFICANT CHANGE UP (ref 3.3–5.2)
ALP SERPL-CCNC: 135 U/L — HIGH (ref 40–120)
ALT FLD-CCNC: 21 U/L — SIGNIFICANT CHANGE UP
AMMONIA BLD-MCNC: 18 UMOL/L — SIGNIFICANT CHANGE UP (ref 11–55)
ANION GAP SERPL CALC-SCNC: 10 MMOL/L — SIGNIFICANT CHANGE UP (ref 5–17)
AST SERPL-CCNC: 22 U/L — SIGNIFICANT CHANGE UP
BASE EXCESS BLDA CALC-SCNC: 9.3 MMOL/L — HIGH (ref -2–3)
BASE EXCESS BLDA CALC-SCNC: 9.5 MMOL/L — HIGH (ref -2–3)
BASOPHILS # BLD AUTO: 0.05 K/UL — SIGNIFICANT CHANGE UP (ref 0–0.2)
BASOPHILS NFR BLD AUTO: 1.2 % — SIGNIFICANT CHANGE UP (ref 0–2)
BILIRUB SERPL-MCNC: 0.7 MG/DL — SIGNIFICANT CHANGE UP (ref 0.4–2)
BLOOD GAS COMMENTS ARTERIAL: SIGNIFICANT CHANGE UP
BUN SERPL-MCNC: 13.9 MG/DL — SIGNIFICANT CHANGE UP (ref 8–20)
CALCIUM SERPL-MCNC: 9.6 MG/DL — SIGNIFICANT CHANGE UP (ref 8.4–10.5)
CHLORIDE SERPL-SCNC: 98 MMOL/L — SIGNIFICANT CHANGE UP (ref 96–108)
CO2 SERPL-SCNC: 30 MMOL/L — HIGH (ref 22–29)
CREAT SERPL-MCNC: 0.83 MG/DL — SIGNIFICANT CHANGE UP (ref 0.5–1.3)
EGFR: 113 ML/MIN/1.73M2 — SIGNIFICANT CHANGE UP
EGFR: 113 ML/MIN/1.73M2 — SIGNIFICANT CHANGE UP
EOSINOPHIL # BLD AUTO: 0.18 K/UL — SIGNIFICANT CHANGE UP (ref 0–0.5)
EOSINOPHIL NFR BLD AUTO: 4.4 % — SIGNIFICANT CHANGE UP (ref 0–6)
GAS PNL BLDV: SIGNIFICANT CHANGE UP
GLUCOSE SERPL-MCNC: 148 MG/DL — HIGH (ref 70–99)
HCO3 BLDA-SCNC: 34 MMOL/L — HIGH (ref 21–28)
HCO3 BLDA-SCNC: 36 MMOL/L — HIGH (ref 21–28)
HCT VFR BLD CALC: 44.3 % — SIGNIFICANT CHANGE UP (ref 39–50)
HGB BLD-MCNC: 14.3 G/DL — SIGNIFICANT CHANGE UP (ref 13–17)
HOROWITZ INDEX BLDA+IHG-RTO: SIGNIFICANT CHANGE UP
IMM GRANULOCYTES # BLD AUTO: 0.03 K/UL — SIGNIFICANT CHANGE UP (ref 0–0.07)
IMM GRANULOCYTES NFR BLD AUTO: 0.7 % — SIGNIFICANT CHANGE UP (ref 0–0.9)
LYMPHOCYTES # BLD AUTO: 1.07 K/UL — SIGNIFICANT CHANGE UP (ref 1–3.3)
LYMPHOCYTES NFR BLD AUTO: 26.4 % — SIGNIFICANT CHANGE UP (ref 13–44)
MCHC RBC-ENTMCNC: 31.1 PG — SIGNIFICANT CHANGE UP (ref 27–34)
MCHC RBC-ENTMCNC: 32.3 G/DL — SIGNIFICANT CHANGE UP (ref 32–36)
MCV RBC AUTO: 96.3 FL — SIGNIFICANT CHANGE UP (ref 80–100)
MONOCYTES # BLD AUTO: 0.26 K/UL — SIGNIFICANT CHANGE UP (ref 0–0.9)
MONOCYTES NFR BLD AUTO: 6.4 % — SIGNIFICANT CHANGE UP (ref 2–14)
NEUTROPHILS # BLD AUTO: 2.46 K/UL — SIGNIFICANT CHANGE UP (ref 1.8–7.4)
NEUTROPHILS NFR BLD AUTO: 60.9 % — SIGNIFICANT CHANGE UP (ref 43–77)
NRBC # BLD AUTO: 0 K/UL — SIGNIFICANT CHANGE UP (ref 0–0)
NRBC # FLD: 0 K/UL — SIGNIFICANT CHANGE UP (ref 0–0)
NRBC BLD AUTO-RTO: 0 /100 WBCS — SIGNIFICANT CHANGE UP (ref 0–0)
PCO2 BLDA: 55 MMHG — HIGH (ref 35–48)
PCO2 BLDA: 73 MMHG — CRITICAL HIGH (ref 35–48)
PH BLDA: 7.3 — LOW (ref 7.35–7.45)
PH BLDA: 7.4 — SIGNIFICANT CHANGE UP (ref 7.35–7.45)
PLATELET # BLD AUTO: 325 K/UL — SIGNIFICANT CHANGE UP (ref 150–400)
PMV BLD: 8.9 FL — SIGNIFICANT CHANGE UP (ref 7–13)
PO2 BLDA: 133 MMHG — HIGH (ref 83–108)
PO2 BLDA: 75 MMHG — LOW (ref 83–108)
POTASSIUM SERPL-MCNC: 4.4 MMOL/L — SIGNIFICANT CHANGE UP (ref 3.5–5.3)
POTASSIUM SERPL-SCNC: 4.4 MMOL/L — SIGNIFICANT CHANGE UP (ref 3.5–5.3)
PROT SERPL-MCNC: 6.9 G/DL — SIGNIFICANT CHANGE UP (ref 6.6–8.7)
RBC # BLD: 4.6 M/UL — SIGNIFICANT CHANGE UP (ref 4.2–5.8)
RBC # FLD: 13.5 % — SIGNIFICANT CHANGE UP (ref 10.3–14.5)
SAO2 % BLDA: 91.5 % — LOW (ref 94–98)
SAO2 % BLDA: 97.6 % — SIGNIFICANT CHANGE UP (ref 94–98)
SODIUM SERPL-SCNC: 138 MMOL/L — SIGNIFICANT CHANGE UP (ref 135–145)
WBC # BLD: 4.05 K/UL — SIGNIFICANT CHANGE UP (ref 3.8–10.5)
WBC # FLD AUTO: 4.05 K/UL — SIGNIFICANT CHANGE UP (ref 3.8–10.5)

## 2025-03-07 PROCEDURE — 99285 EMERGENCY DEPT VISIT HI MDM: CPT

## 2025-03-07 PROCEDURE — 99223 1ST HOSP IP/OBS HIGH 75: CPT

## 2025-03-07 PROCEDURE — 71045 X-RAY EXAM CHEST 1 VIEW: CPT | Mod: 26

## 2025-03-07 RX ORDER — ACETAMINOPHEN 500 MG/5ML
650 LIQUID (ML) ORAL EVERY 6 HOURS
Refills: 0 | Status: DISCONTINUED | OUTPATIENT
Start: 2025-03-07 | End: 2025-03-11

## 2025-03-07 RX ORDER — MELATONIN 5 MG
3 TABLET ORAL AT BEDTIME
Refills: 0 | Status: DISCONTINUED | OUTPATIENT
Start: 2025-03-07 | End: 2025-03-11

## 2025-03-07 RX ORDER — ARIPIPRAZOLE 2 MG/1
1 TABLET ORAL
Refills: 0 | DISCHARGE

## 2025-03-07 RX ORDER — MAGNESIUM, ALUMINUM HYDROXIDE 200-200 MG
30 TABLET,CHEWABLE ORAL EVERY 4 HOURS
Refills: 0 | Status: DISCONTINUED | OUTPATIENT
Start: 2025-03-07 | End: 2025-03-11

## 2025-03-07 RX ORDER — FLUOXETINE HYDROCHLORIDE 20 MG/1
1 CAPSULE ORAL
Refills: 0 | DISCHARGE

## 2025-03-07 RX ORDER — MIDAZOLAM IN 0.9 % SOD.CHLORID 1 MG/ML
2 PLASTIC BAG, INJECTION (ML) INTRAVENOUS ONCE
Refills: 0 | Status: DISCONTINUED | OUTPATIENT
Start: 2025-03-07 | End: 2025-03-07

## 2025-03-07 RX ORDER — TAMSULOSIN HYDROCHLORIDE 0.4 MG/1
0.4 CAPSULE ORAL AT BEDTIME
Refills: 0 | Status: DISCONTINUED | OUTPATIENT
Start: 2025-03-07 | End: 2025-03-11

## 2025-03-07 RX ORDER — ONDANSETRON HCL/PF 4 MG/2 ML
4 VIAL (ML) INJECTION EVERY 8 HOURS
Refills: 0 | Status: DISCONTINUED | OUTPATIENT
Start: 2025-03-07 | End: 2025-03-11

## 2025-03-07 RX ADMIN — Medication 2 MILLIGRAM(S): at 17:52

## 2025-03-07 NOTE — CONSULT NOTE ADULT - SUBJECTIVE AND OBJECTIVE BOX
Patient is a 39y old  Male who presents with a chief complaint of     Consult: lethargy.     BRIEF HOSPITAL COURSE:   38 y/o M from group home with , with hx of bipolar, intellectual disability, anxiety, seizure diosrder, brought in by group home staff for being lethargic.  Per staff, he has been "sleeping a lot," which is not his norm.    At baseline patient appears to be able to sit up and feed himself     Per med rec, patient takes benadryl, medrol dose pack, klonopin depakote, ability, ativan oral as needed, clozapine,     In ED, patient is lethargic, vital sign stable blood gas show hypercapnia at 73.    not tolerating bipap due to unable to cooperate, thus micu consulted.     On exam, patient very active, screaming, and want to pull the bipap mask off.         PAST MEDICAL & SURGICAL HISTORY:  Psychological disorder      Bipolar 1 disorder      MR (mental retardation)  with autistic features      Anxiety      Intellectual disability      Seizure disorder      No significant past surgical history        Allergies    No Known Allergies    Intolerances      FAMILY HISTORY:  No pertinent family history in first degree relatives        Family history otherwise noncontributory.    Social History:     Review of Systems:  unable to obtain, patient is confused      Medications:                    Vital Signs Last 24 Hrs  T(C): 36.4 (07 Mar 2025 14:20), Max: 36.4 (07 Mar 2025 14:20)  T(F): 97.5 (07 Mar 2025 14:20), Max: 97.5 (07 Mar 2025 14:20)  HR: 77 (07 Mar 2025 14:20) (77 - 77)  BP: 111/73 (07 Mar 2025 14:20) (111/73 - 111/73)  BP(mean): --  RR: 17 (07 Mar 2025 14:20) (17 - 17)  SpO2: 97% (07 Mar 2025 14:20) (97% - 97%)    Parameters below as of 07 Mar 2025 14:20  Patient On (Oxygen Delivery Method): room air        ABG - ( 07 Mar 2025 21:11 )  pH, Arterial: 7.300 pH, Blood: x     /  pCO2: 73    /  pO2: 75    / HCO3: 36    / Base Excess: 9.5   /  SaO2: 91.5                I&O's Detail        LABS:                        14.3   4.05  )-----------( 325      ( 07 Mar 2025 15:42 )             44.3     03-07    138  |  98  |  13.9  ----------------------------<  148[H]  4.4   |  30.0[H]  |  0.83    Ca    9.6      07 Mar 2025 15:42    TPro  6.9  /  Alb  4.0  /  TBili  0.7  /  DBili  x   /  AST  22  /  ALT  21  /  AlkPhos  135[H]  03-07          CAPILLARY BLOOD GLUCOSE          Urinalysis Basic - ( 07 Mar 2025 15:42 )    Color: x / Appearance: x / SG: x / pH: x  Gluc: 148 mg/dL / Ketone: x  / Bili: x / Urobili: x   Blood: x / Protein: x / Nitrite: x   Leuk Esterase: x / RBC: x / WBC x   Sq Epi: x / Non Sq Epi: x / Bacteria: x      CULTURES:      Physical Examination:  GENERAL: In NAD   HEENT: NC/AT  NECK: Supple, trachea midline  PULM: CTA b/l   CVS: +S1, S2, RRR  ABD: Soft, non-tender  EXTREMITIES: No pedal edema B/L  SKIN: No open wounds  NEURO: Grossly non-focal    DEVICES:     RADIOLOGY: CXR clear

## 2025-03-07 NOTE — H&P ADULT - PROBLEM SELECTOR PLAN 2
Due to underlying hypercapnia or possible seizure or polypharmacy (psych meds)   Currently back to baseline   - 1:1 with the aid at bedside   - CT Head   - EEG   - Keppra   - F/U Keppra level  - Hold psychiatric meds for now

## 2025-03-07 NOTE — ED ADULT NURSE REASSESSMENT NOTE - NS ED NURSE REASSESS COMMENT FT1
pt medicated w/ 2mg of versed via IM for slight agitation. respiratory to attempt to put pt on bipap after pt is calmer.

## 2025-03-07 NOTE — H&P ADULT - PROBLEM SELECTOR PLAN 3
Home meds   - Aripiprazole 20mg   - Benztropine 2mg   - Clozapine 100mg   - Doxepin 10mg   - Risperidone 0.5mg   Hold meds for now

## 2025-03-07 NOTE — H&P ADULT - NSHPLABSRESULTS_GEN_ALL_CORE
14.3   4.05  )-----------( 325      ( 07 Mar 2025 15:42 )             44.3     138  |  98  |  13.9  ----------------------------<  148[H]     03-07  4.4   |  30.0[H]  |  0.83    Ca    9.6      07 Mar 2025 15:42    TPro  6.9  /  Alb  4.0  /  TBili  0.7  /  DBili  x   /  AST  22  /  ALT  21  /  AlkPhos  135[H]  03-07        15:42 - VBG - pH: 7.280 | pCO2: 73    | pO2: 61    | Lactate: 1.60       CXR    PULMONARY:  No airspace consolidations or radiographic evidence of pulmonary nodules..  No pleural effusion or pneumothorax.    HEART/VASCULAR: The heart size and mediastinum configuration are within the limits of normal.    BONES: The visualized osseous thorax is intact.    IMPRESSION:   No radiographic evidence of active chest disease..

## 2025-03-07 NOTE — H&P ADULT - PROBLEM SELECTOR PLAN 1
Patient presented as lethargic found to be hypercapnic with pCO2 - 73  CXR:  No radiographic evidence of active chest disease..  - High flow - Wean off as tolerated Patient presented as lethargic found to be hypercapnic with pCO2 - 73  CXR:  No radiographic evidence of active chest disease.  - Monitor Respiratory status   - Vitals Q4 hrs (patient refuses to keep tele leads on - removing constantly)

## 2025-03-07 NOTE — ED PROVIDER NOTE - CLINICAL SUMMARY MEDICAL DECISION MAKING FREE TEXT BOX
---------  Marie Perez MD, Attending  39-year-old male past medical history of bipolar 1, MR, anxiety, seizure disorder, intellectual delay, group home resident, brought in by group home staff for being tired.  Per staff patient has been "sleeping a lot", which is not his norm.    in the ED,-year-old patient is sleepy, but perks up when offered food, able to sit up feed self,  and "appears  back to normal" per staff at bedside.  Gen: Well appearing in NAD   Head: NC/AT + headgear on, non tender C spine.   Neck: trachea midline  Resp:  No distress, CTA b.l  abd: soft non tender, non distended, no rebound guarding  Ext: no deformities, 5/5 strength, full ROM>   Neuro:  A&O appears non focal, at baseline.   Skin:  Warm and dry as visualized  ddx includes, but is not limited to the following: medication side effect, infection, metabolic derangement.  plan: screening labs, UA, reassess for disposition.   update: see progress notes Marie Perez MD, Attending  39-year-old male past medical history of bipolar 1, MR, anxiety, seizure disorder, intellectual delay, group home resident, brought in by group home staff for being tired.  Per staff patient has been "sleeping a lot", which is not his norm. In the ED, patient is sleepy, but perks up when offered food, able to sit up feed self,  and "appears  back to normal" per staff at bedside.   Gen: Well appearing in NAD   Head: NC/AT + headgear on, non tender C spine.   Neck: trachea midline  Resp:  No distress, CTA b.l  abd: soft non tender, non distended, no rebound guarding  Ext: no deformities, 5/5 strength, full ROM>   Neuro:  A&O appears non focal, at baseline.   Skin:  Warm and dry as visualized  ddx includes, but is not limited to the following: medication side effect, infection, metabolic derangement.  plan: screening labs, UA, reassess for disposition.   update: see progress notes

## 2025-03-07 NOTE — ED PROVIDER NOTE - PHYSICAL EXAMINATION
PROGRESS NOTE       Subjective     Bina is a 58 year old here for Elbow (Poss shingles )   She started with some pain overnight on the right elbow, woke her up.  She went to put on his longsleeve shirt today and also had some pain as the shirt rubbed across her elbow, noticed a rash at that point.  The rash is clusters of blistery like lesions.  No drainage from the lesions.  She has noticed that from this morning to now the rash is extended distally along the arm.  She suspects that shingles, she did have chickenpox as a young adult (age 22).  She has not received the shingles vaccine.        Review of Systems  As documented above.    SDOH Never Smoker       Objective   Vitals:    09/27/24 1014   BP: 100/80   Pulse: 84   SpO2: 98%   Weight: 50.8 kg (112 lb)     Physical Exam  Constitutional:       General: She is not in acute distress.     Appearance: She is normal weight. She is not ill-appearing or toxic-appearing.   Skin:     Findings: Rash present. Rash is pustular and vesicular.             Comments: Right elbow with vesicular-like rash in clusters, extending distally along the lateral aspect.   Neurological:      Mental Status: She is alert.                  ASSESSMENT AND PLAN          1. Herpes zoster without complication  Assessment & Plan:  The presentation is consistent with shingles, with pain exacerbated by pressure and spreading along the nerve tract. There is no evidence of drainage from the lesions. Valtrex has been prescribed as an antiviral treatment. For pain management, Tylenol can be used. If itching occurs, calamine lotion may provide relief. Should the pain become severe, gabapentin will be considered. The patient is advised to keep the affected area covered to prevent potential spread.  Orders:  -     Valacyclovir HCl 1000 MG Tab; Take 1 tablet by mouth in the morning and 1 tablet at noon and 1 tablet in the evening. Do all this for 7 days.      Return if symptoms worsen or fail to improve.  Attending only. See mdm - Marie Perez MD, Attending

## 2025-03-07 NOTE — H&P ADULT - NSHPPHYSICALEXAM_GEN_ALL_CORE
GENERAL: NAD, comfortable in bed. Saturating well in RA.   HEAD:  Atraumatic, Normocephalic  LUNG: Clear to auscultation bilaterally; No wheezes, rales or rhonchi  HEART: +BS, Regular rate and rhythm; No murmurs, rubs, or gallops  ABDOMEN: Soft, Nontender, Nondistended; Normal Bowel sounds   EXTREMITIES:  2+ Peripheral Pulses, No clubbing, cyanosis, or edema  PSYCH: normal mood and affect  NEUROLOGY: Back to baseline. Alert, awake, cooperative, non-focal   SKIN: No rashes or lesions

## 2025-03-07 NOTE — CONSULT NOTE ADULT - ASSESSMENT
40 y/o M from group home with , with hx of bipolar, intellectual disability, anxiety, seizure diopedro, brought in by group home staff for lethargy.  In the ED, found to be hypercapnic at 73, MICU is called for bipap use.   Patient does not tolerate bipap due to his underlying psych history and unable to cooperate  however, patient also appear fairly awake and alert.      Bicarb is elevated, and was elevated in 2022   suspect some degree of chronic hypercapnea     Per med rec, patient takes benadryl, medrol dose pack, klonopin depakote, ability, ativan oral as needed, clozapine,     #acute on chronic hypercapnic respiratory failure  #polypharmacy, r/o seizure, r/o CNS pathology  #seizure disorder   #intellectual disability, anxiety, bipolar    - can put him on high flow 40 % / 40 L flow  - repeat blood gas base on clinical symptoms  - 1:1 to help with re-orientation   - obtain CT head  - obtain EEG   - avoid benzos  - can do PRN haldol for severe agitation, please check qtc   - obtain depakote level  - can hold off all psych med , can consider restart depakote at lower dose (i.e 250 mg ) tomorrow   - pulmonary consult  - does not need ICU level of care, stepdown for now   - please call back for any question     discussed with ED team

## 2025-03-07 NOTE — ED ADULT NURSE NOTE - OBJECTIVE STATEMENT
pt presents to ED for med eval. pt is a poor historian. pt from group home, staff at bedside. according to staff member pt has lethargy that has been increasingly getting worse. bloodwork obtained via [facility], pt refusing covid swab.

## 2025-03-07 NOTE — ED ADULT TRIAGE NOTE - CHIEF COMPLAINT QUOTE
pt BIBEMS from adult day care for lethargy, otherwise pt is at baseline. pt is awake, reports that his head hurts, unable to obtain further hx or assessment. pt being changed into yellow gown, belongings being secured & being wanded by security.

## 2025-03-07 NOTE — ED ADULT NURSE NOTE - NSFALLHARMRISKINTERV_ED_ALL_ED
Assistance OOB with selected safe patient handling equipment if applicable/Assistance with ambulation/Communicate risk of Fall with Harm to all staff, patient, and family/Monitor gait and stability/Monitor for mental status changes and reorient to person, place, and time, as needed/Provide visual cue: red socks, yellow wristband, yellow gown, etc/Reinforce activity limits and safety measures with patient and family/Toileting schedule using arm’s reach rule for commode and bathroom/Use of alarms - bed, stretcher, chair and/or video monitoring/Bed in lowest position, wheels locked, appropriate side rails in place/Call bell, personal items and telephone in reach/Instruct patient to call for assistance before getting out of bed/chair/stretcher/Non-slip footwear applied when patient is off stretcher/[place] to call system/Physically safe environment - no spills, clutter or unnecessary equipment/Purposeful Proactive Rounding/Room/bathroom lighting operational, light cord in reach

## 2025-03-07 NOTE — H&P ADULT - ASSESSMENT
39-year-old male with a PMH of intellectual disability, Autism, Bipolar, Seizure from a group home present to the ED to be evaluated for lethargy. As per the AID, patient went to his program, while he was there the patient appeared very sleepy with difficulty too arousal which prompted ED visit. Upon ED arrival, the patient was lethargic, found to be hypercapnic with pCO2 – 73 in ABG. Patient was placed on Bipap- unable to tolerate due to uncooperating, then was placed on High flow. Upon evaluation, NAD, saturating well in RA, patient is alert, awake, cooperative, responds to questions, back to baseline as per AID 39-year-old male with a PMH of intellectual disability, Autism, Bipolar, Seizure from a group home present to the ED to be evaluated for lethargy. As per the AID, patient went to his program, while he was there the patient appeared very sleepy with difficulty too arousal which prompted ED visit. Upon ED arrival, the patient was lethargic, found to be hypercapnic with pCO2 – 73 in ABG.

## 2025-03-07 NOTE — H&P ADULT - NSICDXPASTMEDICALHX_GEN_ALL_CORE_FT
PAST MEDICAL HISTORY:  Anxiety     Bipolar 1 disorder     Intellectual disability     MR (mental retardation) with autistic features    Psychological disorder     Seizure disorder

## 2025-03-07 NOTE — ED PROVIDER NOTE - PROGRESS NOTE DETAILS
Marie Perez MD, Attending  PT initial VBG show hypercarbia at 73,   Attempted versed as Anxiolytic BiPAP, patient could not tolerate BiPAP.  ABG obtained at 2111 was without intervention.  Patient now on BiPAP, respiratory will repeat ABG after being on BiPAP for 30 to 45 minutes.  MICU at bedside, recommend stepdown , high flow with Haldol for sedation if admission is needed. AJM: pt received at signout pending micu eval. seen by micu who reccs step down admission. will admit

## 2025-03-07 NOTE — H&P ADULT - HISTORY OF PRESENT ILLNESS
39-year-old male with a PMH of intellectual disability, Autism, Bipolar, Seizure from a group home present to the ED to be evaluated for lethargy. As per the AID, patient went to his program, while he was there the patient appeared very sleepy with difficulty too arousal which prompted ED visit. Upon ED arrival, the patient was lethargic, found to be hypercapnic with pCO2 – 73 in ABG. Patient was placed on Bipap- unable to tolerate due to uncooperating, then was placed on High flow. Upon evaluation, NAD, saturating well in RA, patient is alert, awake, cooperative, responds to questions, back to baseline as per AID, endorses he is feeling well, denies any acute pain or discomfort. Labs unremarkable. Vitals stable.    CXR: No radiographic evidence of active chest disease  39-year-old male with a PMH of intellectual disability, Autism, Bipolar, Seizure from a group home present to the ED to be evaluated for lethargy. As per the AID, patient went to his program, while he was there the patient appeared very sleepy with difficulty too arousal which prompted ED visit. Upon ED arrival, the patient was lethargic, found to be hypercapnic with pCO2 – 73 in ABG. Patient was placed on Bipap- unable to tolerate due to uncooperative, ICU consulted. Upon evaluation, NAD, saturating well in RA, patient is alert, awake, cooperative, responds to questions, back to baseline as per AID, endorses he is feeling well, denies any acute pain or discomfort. Labs unremarkable. Vitals stable.    CXR: No radiographic evidence of active chest disease

## 2025-03-08 DIAGNOSIS — R53.83 OTHER FATIGUE: ICD-10-CM

## 2025-03-08 DIAGNOSIS — Z87.898 PERSONAL HISTORY OF OTHER SPECIFIED CONDITIONS: ICD-10-CM

## 2025-03-08 LAB
APPEARANCE UR: CLEAR — SIGNIFICANT CHANGE UP
BACTERIA # UR AUTO: NEGATIVE /HPF — SIGNIFICANT CHANGE UP
BILIRUB UR-MCNC: NEGATIVE — SIGNIFICANT CHANGE UP
CAST: 0 /LPF — SIGNIFICANT CHANGE UP (ref 0–4)
COLOR SPEC: YELLOW — SIGNIFICANT CHANGE UP
DIFF PNL FLD: NEGATIVE — SIGNIFICANT CHANGE UP
FLUAV AG NPH QL: SIGNIFICANT CHANGE UP
FLUBV AG NPH QL: SIGNIFICANT CHANGE UP
GLUCOSE UR QL: NEGATIVE MG/DL — SIGNIFICANT CHANGE UP
KETONES UR-MCNC: NEGATIVE MG/DL — SIGNIFICANT CHANGE UP
LEUKOCYTE ESTERASE UR-ACNC: NEGATIVE — SIGNIFICANT CHANGE UP
NITRITE UR-MCNC: NEGATIVE — SIGNIFICANT CHANGE UP
PH UR: 7 — SIGNIFICANT CHANGE UP (ref 5–8)
PROT UR-MCNC: NEGATIVE MG/DL — SIGNIFICANT CHANGE UP
RBC CASTS # UR COMP ASSIST: 0 /HPF — SIGNIFICANT CHANGE UP (ref 0–4)
RSV RNA NPH QL NAA+NON-PROBE: SIGNIFICANT CHANGE UP
SARS-COV-2 RNA SPEC QL NAA+PROBE: SIGNIFICANT CHANGE UP
SP GR SPEC: 1.01 — SIGNIFICANT CHANGE UP (ref 1–1.03)
SQUAMOUS # UR AUTO: 0 /HPF — SIGNIFICANT CHANGE UP (ref 0–5)
UROBILINOGEN FLD QL: 1 MG/DL — SIGNIFICANT CHANGE UP (ref 0.2–1)
WBC UR QL: 0 /HPF — SIGNIFICANT CHANGE UP (ref 0–5)

## 2025-03-08 PROCEDURE — 99233 SBSQ HOSP IP/OBS HIGH 50: CPT

## 2025-03-08 PROCEDURE — 93010 ELECTROCARDIOGRAM REPORT: CPT

## 2025-03-08 PROCEDURE — 99222 1ST HOSP IP/OBS MODERATE 55: CPT

## 2025-03-08 PROCEDURE — 70450 CT HEAD/BRAIN W/O DYE: CPT | Mod: 26

## 2025-03-08 RX ORDER — RISPERIDONE 4 MG
3.5 TABLET ORAL
Refills: 0 | Status: DISCONTINUED | OUTPATIENT
Start: 2025-03-08 | End: 2025-03-11

## 2025-03-08 RX ORDER — TAMSULOSIN HYDROCHLORIDE 0.4 MG/1
1 CAPSULE ORAL
Refills: 0 | DISCHARGE

## 2025-03-08 RX ORDER — CLOZAPINE 100 MG
1 TABLET ORAL
Refills: 0 | DISCHARGE

## 2025-03-08 RX ORDER — CLOZAPINE 100 MG
3 TABLET ORAL
Refills: 0 | DISCHARGE

## 2025-03-08 RX ORDER — BENZTROPINE MESYLATE 2 MG
1 TABLET ORAL
Refills: 0 | Status: DISCONTINUED | OUTPATIENT
Start: 2025-03-08 | End: 2025-03-11

## 2025-03-08 RX ORDER — SENNA 187 MG
1 TABLET ORAL
Refills: 0 | Status: DISCONTINUED | OUTPATIENT
Start: 2025-03-08 | End: 2025-03-11

## 2025-03-08 RX ORDER — POLYETHYLENE GLYCOL 3350 17 G/17G
17 POWDER, FOR SOLUTION ORAL
Refills: 0 | DISCHARGE

## 2025-03-08 RX ORDER — CLOZAPINE 100 MG
175 TABLET ORAL AT BEDTIME
Refills: 0 | Status: DISCONTINUED | OUTPATIENT
Start: 2025-03-08 | End: 2025-03-11

## 2025-03-08 RX ORDER — POLYETHYLENE GLYCOL 3350 17 G/17G
17 POWDER, FOR SOLUTION ORAL DAILY
Refills: 0 | Status: DISCONTINUED | OUTPATIENT
Start: 2025-03-08 | End: 2025-03-11

## 2025-03-08 RX ORDER — FLUOXETINE HYDROCHLORIDE 20 MG/1
20 CAPSULE ORAL AT BEDTIME
Refills: 0 | Status: DISCONTINUED | OUTPATIENT
Start: 2025-03-08 | End: 2025-03-11

## 2025-03-08 RX ORDER — SENNA 187 MG
1 TABLET ORAL
Refills: 0 | DISCHARGE

## 2025-03-08 RX ORDER — POLYETHYLENE GLYCOL 3350 17 G/17G
0 POWDER, FOR SOLUTION ORAL
Refills: 0 | DISCHARGE

## 2025-03-08 RX ADMIN — Medication 3.5 MILLIGRAM(S): at 20:34

## 2025-03-08 RX ADMIN — Medication 1 MILLIGRAM(S): at 18:47

## 2025-03-08 NOTE — BH CONSULTATION LIAISON ASSESSMENT NOTE - NSICDXBHSECONDARYDX_PSY_ALL_CORE
Acute respiratory failure with hypercapnia   J96.02  Psychiatric disorder   F99  Lethargic   R53.83  History of seizure   Z87.891

## 2025-03-08 NOTE — BH CONSULTATION LIAISON ASSESSMENT NOTE - CURRENT MEDICATION
MEDICATIONS  (STANDING):  benztropine 1 milliGRAM(s) Oral two times a day  cloZAPine 175 milliGRAM(s) Oral at bedtime  FLUoxetine 20 milliGRAM(s) Oral at bedtime  polyethylene glycol 3350 17 Gram(s) Oral daily  risperiDONE   Tablet 3.5 milliGRAM(s) Oral two times a day  senna 1 Tablet(s) Oral two times a day  tamsulosin 0.4 milliGRAM(s) Oral at bedtime    MEDICATIONS  (PRN):  acetaminophen     Tablet .. 650 milliGRAM(s) Oral every 6 hours PRN Temp greater or equal to 38C (100.4F), Mild Pain (1 - 3)  aluminum hydroxide/magnesium hydroxide/simethicone Suspension 30 milliLiter(s) Oral every 4 hours PRN Dyspepsia  melatonin 3 milliGRAM(s) Oral at bedtime PRN Insomnia  ondansetron Injectable 4 milliGRAM(s) IV Push every 8 hours PRN Nausea and/or Vomiting

## 2025-03-08 NOTE — BH CONSULTATION LIAISON ASSESSMENT NOTE - NSBHCHARTREVIEWVS_PSY_A_CORE FT
Vital Signs Last 24 Hrs  T(C): 36.7 (08 Mar 2025 07:37), Max: 36.7 (08 Mar 2025 07:37)  T(F): 98.1 (08 Mar 2025 07:37), Max: 98.1 (08 Mar 2025 07:37)  HR: 88 (08 Mar 2025 09:36) (65 - 88)  BP: 108/68 (08 Mar 2025 12:29) (108/68 - 135/68)  BP(mean): 77 (08 Mar 2025 12:29) (77 - 99)  RR: 18 (08 Mar 2025 09:36) (16 - 18)  SpO2: 100% (08 Mar 2025 14:30) (77% - 100%)    Parameters below as of 08 Mar 2025 14:30  Patient On (Oxygen Delivery Method): room air

## 2025-03-08 NOTE — PROGRESS NOTE ADULT - SUBJECTIVE AND OBJECTIVE BOX
< from: CT Head No Cont (03.08.25 @ 05:26) >    IMPRESSION:  No acute intracranial hemorrhage, mass effect, or midline shift.  If symptoms persist, consider MRI for further assessment if not   contraindicated.      < end of copied text >       Patient is a 39y old  Male who presents with a chief complaint of Acute Hypercapnic Respiratory Failure (08 Mar 2025 09:09)      Pt is sitting comfortable RA. Refusing labs/tele/imaging   at bedside,per him, pt is in his baseline mentation. He is trying to convince pt to compliant with lebs/imaging/monitor. Pt is refusing  SO2 dropped to 77 % per RN,Placed on oxygen   REVIEW OF SYSTEMS: All systems are reviewed and found to be negative except above--not reliable    MEDICATIONS  (STANDING):  tamsulosin 0.4 milliGRAM(s) Oral at bedtime    MEDICATIONS  (PRN):  acetaminophen     Tablet .. 650 milliGRAM(s) Oral every 6 hours PRN Temp greater or equal to 38C (100.4F), Mild Pain (1 - 3)  aluminum hydroxide/magnesium hydroxide/simethicone Suspension 30 milliLiter(s) Oral every 4 hours PRN Dyspepsia  melatonin 3 milliGRAM(s) Oral at bedtime PRN Insomnia  ondansetron Injectable 4 milliGRAM(s) IV Push every 8 hours PRN Nausea and/or Vomiting      CAPILLARY BLOOD GLUCOSE        I&O's Summary    08 Mar 2025 06:01  -  08 Mar 2025 14:23  --------------------------------------------------------  IN: 444 mL / OUT: 575 mL / NET: -131 mL        PHYSICAL EXAM:  Vital Signs Last 24 Hrs  T(C): 36.7 (08 Mar 2025 07:37), Max: 36.7 (08 Mar 2025 07:37)  T(F): 98.1 (08 Mar 2025 07:37), Max: 98.1 (08 Mar 2025 07:37)  HR: 88 (08 Mar 2025 09:36) (65 - 88)  BP: 108/68 (08 Mar 2025 12:29) (108/68 - 135/68)  BP(mean): 77 (08 Mar 2025 12:29) (77 - 99)  RR: 18 (08 Mar 2025 09:36) (16 - 18)  SpO2: 100% (08 Mar 2025 12:29) (77% - 100%)    Parameters below as of 08 Mar 2025 12:29  Patient On (Oxygen Delivery Method): nasal cannula        CONSTITUTIONAL: NAD,  EYES:  conjunctiva and sclera clear  ENMT: Moist oral mucosa,   RESPIRATORY: Normal respiratory effort; lungs are clear to auscultation bilaterally  CARDIOVASCULAR: Regular rate and rhythm, normal S1 and S2, no murmur   EXTS: No lower extremity edema; Peripheral pulses are 2+ bilaterally  ABDOMEN: Nontender to palpation, normoactive bowel sounds, no rebound/guarding;   MUSCLOSKELETAL: no joint swelling or tenderness to palpation  PSYCH: calm,non coop  NEUROLOGY: A+O to person, place, moving all exts. limited exam       LABS:                        14.3   4.05  )-----------( 325      ( 07 Mar 2025 15:42 )             44.3         138  |  98  |  13.9  ----------------------------<  148[H]  4.4   |  30.0[H]  |  0.83    Ca    9.6      07 Mar 2025 15:42    TPro  6.9  /  Alb  4.0  /  TBili  0.7  /  DBili  x   /  AST  22  /  ALT  21  /  AlkPhos  135[H]  03-07          Urinalysis Basic - ( 08 Mar 2025 11:46 )    Color: Yellow / Appearance: Clear / S.011 / pH: x  Gluc: x / Ketone: Negative mg/dL  / Bili: Negative / Urobili: 1.0 mg/dL   Blood: x / Protein: Negative mg/dL / Nitrite: Negative   Leuk Esterase: Negative / RBC: 0 /HPF / WBC 0 /HPF   Sq Epi: x / Non Sq Epi: 0 /HPF / Bacteria: Negative /HPF          RADIOLOGY & ADDITIONAL TESTS:  Results Reviewed:            from: CT Head No Cont (25 @ 05:26) >    IMPRESSION:  No acute intracranial hemorrhage, mass effect, or midline shift.  If symptoms persist, consider MRI for further assessment if not   contraindicated.      < end of copied text >

## 2025-03-08 NOTE — BH CONSULTATION LIAISON ASSESSMENT NOTE - RISK ASSESSMENT
RF: male, ASD, unemployed, single    PF: no prior SA, no hx of substance use d/o, no  or legal hx, age

## 2025-03-08 NOTE — BH CONSULTATION LIAISON ASSESSMENT NOTE - NSBHMSEAFFRANGE_PSY_A_CORE
GIRLMADHAVI MARIA GUADALUPE; First Name: ______      GA 34.4 weeks;     Age:7d;   PMA: 35.1  BW: 1869   MRN: [id number]    COURSE: 34 wker, maternal hypothyroid and PEC, AGA, immature thermoregulation, hyperbilirubinemia of prematurity    INTERVAL EVENTS: phototherapy discontinued 9/22, passed CSC    Weight (g): 1865 +                          Intake (ml/kg/day): 171  Urine output (ml/kg/hr or frequency): x8                             Stools (frequency): x5  Other: OC     Growth:    HC (cm): 30.5 (09-17)  % ______ .         [09-17]  Length (cm):  43; % ______ .  Weight %  ____ ; ADWG (g/day)  _____ .   (Growth chart used _____ ) .  *******************************************************  Respiratory: Comfortable in RA. Continuous cardiorespiratory monitoring for risk of apnea of prematurity and associated bradycardia.     CV: Hemodynamically stable. LRH, EKG (9/20) wnl.     FEN: EHM/NS po ad joão q3 hours, taking 35-42 ml per feed. Enable breastfeeding.  POC glucose monitoring as per guideline for prematurity.  Monitor feeding adequacy as at risk for poor feeding coordination and stamina due to prematurity.     Heme: A+/A+/shahram -. Monitor for anemia and thrombocytopenia. Screening CBC within normal limits.   ·	Hyperbilirubinemia due to prematurity, requiring phototherapy [9/20 - 9/22]. Bili in AM.    ID: Monitor for signs and symptoms of sepsis.  CBC on admission wnl.    Endo: Maternal hypothyroidism on synthroid.  TFTs wnl.    Neuro: Normal exam for GA. NDE 9/18 NRE 5, no EI, f/u in 6 months    Thermal: Immature thermoregulation requiring radiant warmer or heated incubator to prevent hypothermia. Wean to crib as tolerated.    Social: Family updated at bedside 9/18 (VBF).     Labs/Imaging/Studies: AM: bilirubin  _P    Discharge planning: Passed , FU PMD 9/ 25.    This patient requires ICU care including continuous monitoring and frequent vital sign assessment due to significant risk of cardiorespiratory compromise or decompensation outside of the NICU. Full

## 2025-03-08 NOTE — BH CONSULTATION LIAISON ASSESSMENT NOTE - RECORDS REVIEWED
1. Normal appearing external genitalia  2. On laparoscopy single filmy omental adhesion along anterior abdominal wall at level of umbilicus. Left in place. Normal appearing uterus, bilateral fallopian tubes and ovaries.   3. On cystoscopy, normal appearing bladder mucosa without suture, injury, mesh or foreign body. Efflux of clear yellow urine from bilateral ureteral orifices.   4. Rectal exam without suture or injury. 
Hospital chart (includes HIE)

## 2025-03-08 NOTE — CONSULT NOTE ADULT - SUBJECTIVE AND OBJECTIVE BOX
39 M w/ ID, Autism, Bipolar disorder, Seizure from a group home, presented to the ED for lethargy and found to be in acute on chronic hypercapnic respiratory failure deemed secondary to polypharmacy in setting of seizure meds and other meds. Could not tolerate being placed on NIV so he was placed on HFNC with improvement in his hypercapnia. MICU evaluated him and deemed stable for step down.    He is in a chair with NC on and appears comfortable. His nurse moved him to the bed. it is difficult to obtai na good saturation on him because he is not cooperative with testing. We were able to get a sat on him and it was 100% on 3L NC. He says he has trouble breathing but is speaking and moving comfortably.    Vital Signs:    T(C): 36.7 (03-08-25 @ 07:37), Max: 36.7 (03-08-25 @ 07:37)  HR: 88 (03-08-25 @ 09:36) (65 - 88)  BP: 108/68 (03-08-25 @ 12:29) (108/68 - 135/68)  RR: 18 (03-08-25 @ 09:36) (16 - 18)  SpO2: 100% (03-08-25 @ 12:29) (77% - 100%)    Vent data:        I's/O's:      03-08-25 @ 06:01  -  03-08-25 @ 14:22  --------------------------------------------------------  IN: 444 mL / OUT: 575 mL / NET: -131 mL        PMH:    Acute respiratory failure with hypoxia    No pertinent family history in first degree relatives    Handoff    MEWS Score    Psychological disorder    Bipolar 1 disorder    MR (mental retardation)    Anxiety    Intellectual disability    Seizure disorder    Acute respiratory failure with hypoxia and hypercarbia    Acute respiratory failure with hypercapnia    Psychiatric disorder    Lethargic    History of seizure    No significant past surgical history    No significant past surgical history    MED EVAL    90+    [email or web address]        Allergies:    No Known Allergies      Labs:        Micro:        Physical Exam:    Gen: NAD  HEENT: AT  CV: No obvious murmurs  Lungs: CTA B/L  Abd: Soft, NT  Ext: No edema  Neuro: CN II - XII grossly intact  Mental status: At baseline    Radiology:      Medications:    acetaminophen     Tablet .. 650 milliGRAM(s) Oral every 6 hours PRN  aluminum hydroxide/magnesium hydroxide/simethicone Suspension 30 milliLiter(s) Oral every 4 hours PRN  melatonin 3 milliGRAM(s) Oral at bedtime PRN  ondansetron Injectable 4 milliGRAM(s) IV Push every 8 hours PRN  tamsulosin 0.4 milliGRAM(s) Oral at bedtime

## 2025-03-08 NOTE — BH CONSULTATION LIAISON ASSESSMENT NOTE - NSBHCONSULTFOLLOWAFTERCARE_PSY_A_CORE FT
recommend patient continue f/u with current psychiatric medication management providers at living facility

## 2025-03-08 NOTE — BH CONSULTATION LIAISON ASSESSMENT NOTE - OTHER
currently lives at [facility] facility in [place]  Group Home staff member (Harry Dillon) not assessed currently lives at [facility] facility in [place]  baseline for pt due to ASD ASD likely baseline for pt due to ASD

## 2025-03-08 NOTE — BH CONSULTATION LIAISON ASSESSMENT NOTE - SUMMARY
39 year old  single male, domiciled at [facility] living facility, currently unemployed due to mental disability, with PMH of seizures, with past psych history of ASD and BAD and no hx of substance use disorder, no known or reported prior inpatient psychiatric hospitalizations, no prior SA, no NSSIB, no history of violence outside of poor frustration tolerance secondary to ASD, with no known psychiatric family history, currently on multiple psychiatric medications, compliant , follows with psychiatrist at Group Home, BIB EMS due to lethargy with suspected respiratory failure. Psychiatric medications were discontinued at time of hospital admission.  Psych consulted for medication management recommendations due to respiratory failure.     Respiratory symptoms appear to be largely resolved at this time, with no respiratory distress noted or appreciated when seen this evening. Patient currently on multiple psychiatric medications which were discontinued at time of admission to hospital. Per staff at nursing facility, patient has been under good behavioral control while at the living facility, thus indicating that current medication regimen has been managing patient's symptoms and agitation well overall. Majority of patient's medications have no potential side effects which include respiratory depression aside from patient's Klonopin which is PRN. Patient has a hx of seizures, not currently on any antiseizure medications (per chart, has been on VPA in the past). Would recommending continuing Klonopin as PRN due to this. Doxepin likely being used for insomnia, would recommend switching that to PRN. Cogentin dose currently rather high, would reinitiate medication at half the current dose. Patient's clozapine should be restarted immediately due to need to restart at initiating dose if patient is off the medication for more that 72 hours which would then pose a risk for behavioral decompensation and increase risk for agitation secondary to ASD.     Plan:  -Restart Abilify 20 mg qD  -Restart Cogentin and decrease from 2 mg BID to 1 mg BID  -Restart Clozapine 175 mg qHS  -Restart Prozac 20 mg qHS  -Restart Klonopin 1 mg BID PRN  -Restart risperidone 3.5 mg qAM + qHS  -Restart doxepin 10 mg qHS as PRN for insomnia   -Restart Miralax 17 grams qD  -Restart Senna 8.6 mg BID

## 2025-03-08 NOTE — BH CONSULTATION LIAISON ASSESSMENT NOTE - HPI (INCLUDE ILLNESS QUALITY, SEVERITY, DURATION, TIMING, CONTEXT, MODIFYING FACTORS, ASSOCIATED SIGNS AND SYMPTOMS)
39 year old  single male, domiciled at [facility] living facility, currently unemployed due to mental disability, with PMH of seizures, with past psych history of ASD and BAD and no hx of substance use disorder, no known or reported prior inpatient psychiatric hospitalizations, no prior SA, no NSSIB, no history of violence outside of poor frustration tolerance secondary to ASD, with no known psychiatric family history, currently on multiple psychiatric medications, compliant , follows with psychiatrist at [facility], BIB EMS due to lethargy with suspected respiratory failure. Psychiatric medications were discontinued at time of hospital admission.  Psych consulted for medication management recommendations due to respiratory failure.     Patient seen and assessed with group home staff member at [facility]. On presentation, patient was calm, cooperative and friendly. He was observed to be sitting in the chair next to his bed in no acute distress and with no signs of respiratory distress. Patient reports that his mood is "good" and denies having any acute complaints or concerns. Unable to obtain any additional information directly from patient due to patient's MR and ASD.    Staff member at [facility] not as well versed with patient's history and overall presentation while at facility prior to being sent to the hospital. Spoke with Harry Dillon over the phone utilizing other staff member's cellphone. He reports that prior to patient's presentation to the hospital, he had been doing well psychiatrically. Although he has a long standing hx of poor frustration tolerance due to having ASD, he had been doing well overall otherwise prior to event of respiratory depression. Has been compliant with his medications while at the living facility. Denies any hx of past psychiatric hospitalizations, SA, NSSIB, substance use hx, or FPHx.     Medication list MAR faxed over from facility. Patient is on the following medication regimen:  -Abilify 20 mg qD  -Cogentin 2 mg BID  -Clozapine 175 mg qHS  -Prozac 20 mg qHS  -Klonopin 1 mg BID PRN  -risperidone 3.5 mg qAM + qHS  -doxepin 10 mg qHS  -Miralax 17 grams qD  -Senna 8.6 mg BID

## 2025-03-08 NOTE — BH CONSULTATION LIAISON ASSESSMENT NOTE - NSBHCHARTREVIEWLAB_PSY_A_CORE FT
CBC Full  -  ( 07 Mar 2025 15:42 )  WBC Count : 4.05 K/uL  RBC Count : 4.60 M/uL  Hemoglobin : 14.3 g/dL  Hematocrit : 44.3 %  Platelet Count - Automated : 325 K/uL  Mean Cell Volume : 96.3 fl  Mean Cell Hemoglobin : 31.1 pg  Mean Cell Hemoglobin Concentration : 32.3 g/dL  Auto Neutrophil # : 2.46 K/uL  Auto Lymphocyte # : 1.07 K/uL  Auto Monocyte # : 0.26 K/uL  Auto Eosinophil # : 0.18 K/uL  Auto Basophil # : 0.05 K/uL  Auto Neutrophil % : 60.9 %  Auto Lymphocyte % : 26.4 %  Auto Monocyte % : 6.4 %  Auto Eosinophil % : 4.4 %  Auto Basophil % : 1.2 %

## 2025-03-08 NOTE — CONSULT NOTE ADULT - ASSESSMENT
39 M w/ ID, Autism, Bipolar disorder, Seizure from a group home, presented to the ED for lethargy and found to be in acute on chronic hypercapnic respiratory failure deemed secondary to polypharmacy in setting of seizure meds and other meds. Unclear if he is truly hypoxemic but reportedly per nurse on room air he was saturating 77% but was not in respiratory distress. Etiology of chronic hypercapnia (dating back to labs in 2022) is unclear but he is unlikely to tolerate testing for it which would include testing such as a diaphragmatic sniff test, MRI brain, CT chest, polysomnography, etc.    - Can obtain testing as above if possible but he is unlikely to comply  - Would try to obtain saturation with good waveform or ABG on room air to confirm hypoxemia prior to proceeding  - Would also qualify for nocturnal NIV for chronic hypercapnia but he is unlikely to use it so this will have to be discussed w/ family  - Please call us back with any findings from any additional studies performed    d/w hospitalist Dr. Anthony    Pulmonary team will sign off at this time. Please don't hesitate to reach out for any questions or concerns, or changes in clinical status.

## 2025-03-08 NOTE — CONSULT NOTE ADULT - TIME BILLING
greater than 50% of time spent reviewing labs, notes, orders and radiographs, coordinating care  discussed with nursing, primary team,  consultants
reviewing labs, notes, orders, radiographic studies, as well as counseling and coordinating care with the relevant multidisciplinary team, including with the primary and consulting providers.

## 2025-03-09 LAB
MRSA PCR RESULT.: DETECTED
S AUREUS DNA NOSE QL NAA+PROBE: DETECTED

## 2025-03-09 PROCEDURE — 93306 TTE W/DOPPLER COMPLETE: CPT | Mod: 26

## 2025-03-09 PROCEDURE — 93970 EXTREMITY STUDY: CPT | Mod: 26

## 2025-03-09 PROCEDURE — 99233 SBSQ HOSP IP/OBS HIGH 50: CPT

## 2025-03-09 RX ORDER — CLONAZEPAM 0.5 MG/1
1 TABLET ORAL
Refills: 0 | Status: DISCONTINUED | OUTPATIENT
Start: 2025-03-09 | End: 2025-03-11

## 2025-03-09 RX ORDER — MUPIROCIN CALCIUM 20 MG/G
1 CREAM TOPICAL
Refills: 0 | Status: DISCONTINUED | OUTPATIENT
Start: 2025-03-09 | End: 2025-03-11

## 2025-03-09 RX ORDER — ARIPIPRAZOLE 2 MG/1
20 TABLET ORAL DAILY
Refills: 0 | Status: DISCONTINUED | OUTPATIENT
Start: 2025-03-09 | End: 2025-03-11

## 2025-03-09 RX ADMIN — Medication 175 MILLIGRAM(S): at 21:18

## 2025-03-09 RX ADMIN — ARIPIPRAZOLE 20 MILLIGRAM(S): 2 TABLET ORAL at 17:44

## 2025-03-09 RX ADMIN — Medication 3.5 MILLIGRAM(S): at 17:46

## 2025-03-09 RX ADMIN — Medication 1 MILLIGRAM(S): at 05:36

## 2025-03-09 RX ADMIN — Medication 1 APPLICATION(S): at 17:46

## 2025-03-09 RX ADMIN — TAMSULOSIN HYDROCHLORIDE 0.4 MILLIGRAM(S): 0.4 CAPSULE ORAL at 21:18

## 2025-03-09 RX ADMIN — Medication 1 MILLIGRAM(S): at 17:45

## 2025-03-09 RX ADMIN — Medication 1 TABLET(S): at 17:44

## 2025-03-09 RX ADMIN — FLUOXETINE HYDROCHLORIDE 20 MILLIGRAM(S): 20 CAPSULE ORAL at 21:19

## 2025-03-09 NOTE — PROGRESS NOTE ADULT - ASSESSMENT
39-year-old male with a PMH of intellectual disability, Autism, Bipolar, Seizure from a group home present to the ED to be evaluated for lethargy. As per the AID, patient went to his program, while he was there the patient appeared very sleepy with difficulty too arousal which prompted ED visit. Upon ED arrival, the patient was lethargic, found to be hypercapnic with pCO2 – 73 in ABG. not tolerating bipap due to unable to cooperate, thus micu consulted. Later pt was saturating well in RA, patient was alert, awake, cooperative, responds to questions, back to baseline as per AID.     Acute on chronic hypercapnic respiratory failure  -pt is  off oxygen. so2 stable am  -Likely polypharmacy, r/o seizure  -CTH stable   -Co2 55 on repeat abg 03/07  -Mental status back to baseline   -Bicarb is elevated, and was elevated in 2022. suspect some degree of chronic hypercapnea  - EEG ,pt refused  - pt was not on depakote  - meds adjusting per  psych   - cxr stable. ct chest r/o pna-pt is refusing ct  - stable viral panel  -seen by pulmonary  -f/u  psych rec        seizure disorder   intellectual disability, anxiety, bipolar  -seizure precaution  -eeg, pt refused  -not on any seizure meds at home  -aspiration precaution    disposition: Acute. pending work up  KIRAN SIMS

## 2025-03-09 NOTE — PROVIDER CONTACT NOTE (OTHER) - ACTION/TREATMENT ORDERED:
Tired patient on NIV however he became very upset and started to grab and pull at the mask. Left NIV in room on Stand-by.
Pt refusing SCD, PILI Barnard made aware

## 2025-03-09 NOTE — PROGRESS NOTE ADULT - SUBJECTIVE AND OBJECTIVE BOX
Patient is a 39y old  Male who presents with a chief complaint of Acute Hypercapnic Respiratory Failure (08 Mar 2025 14:16)      pt is calm. Breathing RA,SO2 stable .denies cp,sob  per care giver,at his nl mentation.  Refusing all labs/imaging. taking some meds      MEDICATIONS  (STANDING):  ARIPiprazole 20 milliGRAM(s) Oral daily  benztropine 1 milliGRAM(s) Oral two times a day  chlorhexidine 2% Cloths 1 Application(s) Topical daily  cloZAPine 175 milliGRAM(s) Oral at bedtime  FLUoxetine 20 milliGRAM(s) Oral at bedtime  polyethylene glycol 3350 17 Gram(s) Oral daily  risperiDONE   Tablet 3.5 milliGRAM(s) Oral two times a day  senna 1 Tablet(s) Oral two times a day  tamsulosin 0.4 milliGRAM(s) Oral at bedtime    MEDICATIONS  (PRN):  acetaminophen     Tablet .. 650 milliGRAM(s) Oral every 6 hours PRN Temp greater or equal to 38C (100.4F), Mild Pain (1 - 3)  aluminum hydroxide/magnesium hydroxide/simethicone Suspension 30 milliLiter(s) Oral every 4 hours PRN Dyspepsia  clonazePAM  Tablet 1 milliGRAM(s) Oral two times a day PRN for agitation  melatonin 3 milliGRAM(s) Oral at bedtime PRN Insomnia  ondansetron Injectable 4 milliGRAM(s) IV Push every 8 hours PRN Nausea and/or Vomiting      CAPILLARY BLOOD GLUCOSE        I&O's Summary    08 Mar 2025 06:01  -  09 Mar 2025 07:00  --------------------------------------------------------  IN: 666 mL / OUT: 575 mL / NET: 91 mL    09 Mar 2025 07:01  -  09 Mar 2025 14:56  --------------------------------------------------------  IN: 472 mL / OUT: 850 mL / NET: -378 mL        PHYSICAL EXAM:  Vital Signs Last 24 Hrs  T(C): 36.4 (09 Mar 2025 05:30), Max: 36.6 (08 Mar 2025 20:30)  T(F): 97.5 (09 Mar 2025 05:30), Max: 97.9 (08 Mar 2025 20:30)  HR: 75 (09 Mar 2025 05:30) (71 - 75)  BP: 103/73 (09 Mar 2025 11:23) (103/73 - 109/68)  BP(mean): 82 (09 Mar 2025 11:23) (82 - 84)  RR: 16 (09 Mar 2025 11:23) (15 - 19)  SpO2: 96% (09 Mar 2025 11:23) (95% - 98%)    Parameters below as of 09 Mar 2025 11:23  Patient On (Oxygen Delivery Method): room air        CONSTITUTIONAL: NAD,  EYES: PERRLA; conjunctiva and sclera clear  RESPIRATORY: Normal respiratory effort; lungs are clear to auscultation bilaterally  CARDIOVASCULAR: Regular rate and rhythm, normal S1 and S2, no murmur   EXTS: No lower extremity edema; Peripheral pulses are 2+ bilaterally  ABDOMEN: Nontender to palpation, normoactive bowel sounds, no rebound/guarding;   MUSCLOSKELETAL:   no joint swelling or tenderness to palpation  PSYCH: calm  NEUROLOGY: A+O to person, place, and not to time; +m/s.limited exam as pt is refusing       LABS:                        14.3   4.05  )-----------( 325      ( 07 Mar 2025 15:42 )             44.3     03-07    138  |  98  |  13.9  ----------------------------<  148[H]  4.4   |  30.0[H]  |  0.83    Ca    9.6      07 Mar 2025 15:42    TPro  6.9  /  Alb  4.0  /  TBili  0.7  /  DBili  x   /  AST  22  /  ALT  21  /  AlkPhos  135[H]  03-07          Urinalysis Basic - ( 08 Mar 2025 11:46 )    Color: Yellow / Appearance: Clear / S.011 / pH: x  Gluc: x / Ketone: Negative mg/dL  / Bili: Negative / Urobili: 1.0 mg/dL   Blood: x / Protein: Negative mg/dL / Nitrite: Negative   Leuk Esterase: Negative / RBC: 0 /HPF / WBC 0 /HPF   Sq Epi: x / Non Sq Epi: 0 /HPF / Bacteria: Negative /HPF          RADIOLOGY & ADDITIONAL TESTS:  Results Reviewed:

## 2025-03-10 ENCOUNTER — TRANSCRIPTION ENCOUNTER (OUTPATIENT)
Age: 40
End: 2025-03-10

## 2025-03-10 PROCEDURE — 99233 SBSQ HOSP IP/OBS HIGH 50: CPT

## 2025-03-10 RX ORDER — BISACODYL 5 MG
5 TABLET, DELAYED RELEASE (ENTERIC COATED) ORAL EVERY 12 HOURS
Refills: 0 | Status: DISCONTINUED | OUTPATIENT
Start: 2025-03-10 | End: 2025-03-11

## 2025-03-10 RX ORDER — BISACODYL 5 MG
5 TABLET, DELAYED RELEASE (ENTERIC COATED) ORAL AT BEDTIME
Refills: 0 | Status: DISCONTINUED | OUTPATIENT
Start: 2025-03-10 | End: 2025-03-11

## 2025-03-10 RX ADMIN — Medication 3.5 MILLIGRAM(S): at 18:04

## 2025-03-10 RX ADMIN — Medication 175 MILLIGRAM(S): at 21:44

## 2025-03-10 RX ADMIN — Medication 1 TABLET(S): at 18:03

## 2025-03-10 RX ADMIN — Medication 5 MILLIGRAM(S): at 21:45

## 2025-03-10 RX ADMIN — Medication 1 APPLICATION(S): at 10:59

## 2025-03-10 RX ADMIN — POLYETHYLENE GLYCOL 3350 17 GRAM(S): 17 POWDER, FOR SOLUTION ORAL at 10:59

## 2025-03-10 RX ADMIN — Medication 1 MILLIGRAM(S): at 18:04

## 2025-03-10 RX ADMIN — FLUOXETINE HYDROCHLORIDE 20 MILLIGRAM(S): 20 CAPSULE ORAL at 21:45

## 2025-03-10 RX ADMIN — TAMSULOSIN HYDROCHLORIDE 0.4 MILLIGRAM(S): 0.4 CAPSULE ORAL at 21:45

## 2025-03-10 RX ADMIN — ARIPIPRAZOLE 20 MILLIGRAM(S): 2 TABLET ORAL at 10:59

## 2025-03-10 NOTE — DISCHARGE NOTE PROVIDER - NSDCCPCAREPLAN_GEN_ALL_CORE_FT
PRINCIPAL DISCHARGE DIAGNOSIS  Diagnosis: Acute respiratory failure with hypoxia and hypercarbia  Assessment and Plan of Treatment: You came in with increased sleepiness and lethargy and we did some imaging of your head that was negative for any masses or bleeding that could cause a change in mental status. We did some studies of your blood which showed an increased amount of CO2 gas which is the likely cause of your lethargy. CO2 gas is normally exhaled with each breath and when it is not blown off adequately, it builds up and can cause a change in mental status. Your mental status improved once we placed a breathing mask on your face that helps keep your tongue from blocking your airway and prevents CO2 from building. You should follow up with the pulmonologist for a sleep study to get formally diagnosed with AIME or sleep apnea. You should also wear the CPAP mask every night when you sleep.      SECONDARY DISCHARGE DIAGNOSES  Diagnosis: Psychiatric disorder  Assessment and Plan of Treatment: You have a known psychiatric disorder and were seen by psychiatrists while in the hospital who recommended some changes to your daily medications. You should continue with the following medications:  -Cogentin 1mg twice a day  -Abilify 20mg every day  -Klonipin 1mg twice a day as needed  -clozapine 175mg at bedtime   -prozac 20mg bedtime   -respiridone 3.5mg twice a day

## 2025-03-10 NOTE — DISCHARGE NOTE PROVIDER - COLLABORATE WITH
Continuity of Care Document

                            Created on:2022



Patient:SCHOENEBERG, ELLIOT LEVI

Sex:Male

:2020

External Reference #:[id number]





Demographics







                          Address                   [address]



                                                    [place] [address]

 

                          Home Phone                [phone]

 

                          Mobile Phone              [phone]

 

                          Email Address             [email or web address]

 

                          Preferred Language        en

 

                          Marital Status            Unknown

 

                          Presybeterian Affiliation     Unknown

 

                          Race                      Unknown

 

                          Additional Race(s)        White



                                                    Unavailable

 

                          Ethnic Group              Not  or 









Author







                          Organization              [facility]

t

 

                          Address                   [address]



                                                    [place] [address]

 

                          Phone                     [phone]









Support







                Name            Relationship    Address         Phone

 

                Schoeneberg, mom Mother          Unavailable     [phone]

 

                SCHOENEBERG, JUSTIN LEE F               Unavailable     Unavaila

[facility]









Care Team Providers







                    Name                Role                Phone

 

                    Pcp, Patient Does Not Have A Primary Care Physician +[phone]



 

                    Sondra Mathew MD     Attending Clinician [phone]

 

                    Sherman Colunga    Attending Clinician [phone]

 

                    SHERMAN PELLETIER        Attending Clinician Unavailable

 

                    KING NARVAEZ        Attending Clinician Unavailable

 

                    Doctor Unassigned, No Name Attending Clinician Unavailable









Payers







           Payer Name Policy Type Policy Number Effective Date Expiration Date S

ource







Problems







       Condition Condition Condition Status Onset  Resolution Last   Treating Co

mments 

Source



       Name   Details Category        Date   Date   Treatment Clinician        



                                                 Date                 

 

       No known No known Disease                                           Un[facility]

rs



       active active                                                  ity of



       problems problems                                                  [facility]







Allergies, Adverse Reactions, Alerts







       Allergy Allergy Status Severity Reaction(s) Onset  Inactive Treating Comm

ents 

Source



       Name   Type                        Date   Date   Clinician        

 

       NO KNOWN Drug   Active                                           [facility]



       ALLERGIE Class                                                   ity of



       S                                                              [facility]







Social History







           Social Habit Start Date Stop Date  Quantity   Comments   Source

 

           Exposure to 2022 Not sure              [facility]

 [place]



           SARS-CoV-2 (event) 00:00:00   20:00:00                         [facility]

l Branch

 

           Sex Assigned At 2020                       [facility]



           Birth      00:00:00   00:00:00                         [facility]









                Smoking Status  Start Date      Stop Date       Source

 

                Unknown if ever smoked                                 [facility]







Medications







       Ordered Filled Start  Stop   Current Ordering Indication Dosage Frequency

 Signature

                    Comments            Components          Source



     Medication Medication Date Date Medication? Clinician                (SIG) 

          



     Name Name                                                   

 

     bromphenira            Yes       [id number] 2.5mL      Take 2.5       

    [facility]



     mine-pseudo      5-22                               mL by           ity of



     ephedrine-D      00:00:                               mouth 4           [place]

as



     M (BROMFED      00                                 (four)           Medical



     DM) 2-30-10                                         times           Branch



     mg/5 mL                                         daily as           



     syrup                                         needed for           



                                                  Congestion           



                                                  /Allergies           



                                                  .              







Vital Signs







             Vital Name   Observation Time Observation Value Comments     Source

 

             Systolic blood 2022 01:02:00 97 mm[Hg]                 [facility]

sity of



             pressure                                            [facility]

 

             Diastolic blood 2022 01:02:00 65 mm[Hg]                 Unive

rsity of



             pressure                                            [facility]

 

             Heart rate   2022 01:02:00 153 /min                  [facility]

 

             Body temperature 2022 01:02:00 37.17 Shauna                 [facility]

 

             Respiratory rate 2022 01:02:00 22 /min                   [facility]

 

             Body height  2022 01:02:00 108 cm                    [facility]

 

             Body weight  2022 01:02:00 17.373 kg                 [facility]

 

             BMI          2022 01:02:00 14.91 kg/m2               [facility]

 

             Body mass index 2022 01:02:00 24.49 %                   Unive

rsity of



             (BMI) [Percentile]                                        [place] [facility]

ical



             Per age and sex                                        Branch

 

             Oxygen saturation in 2022 01:02:00 98 /min                   

[facility]



             Arterial blood by                                        [place] [facility]

carlin



             Pulse oximetry                                        Branch

 

             Weight-for-length 2022 01:02:00 32.53 %                   Uni

versity of



             Per age and sex                                        [place] [facility]

l



                                                                 Branch







Procedures







                Procedure       Date / Time Performed Performing Clinician Sourc

e

 

                POCT MOLECULAR FLU 2022 01:10:00 Sherman Pelletier

[facility]

 

                ASSIGNMENT OF BENEFITS 2022 00:58:09 Doctor Unassigned, No

 [facility]



                                                Name            Medical Branch







Encounters







        Start   End     Encounter Admission Attending Care    Care    Encounter 

Source



        Date/Time Date/Time Type    Type    Clinicians Facility Department ID   

   

 

        2022 Urgent          Sondra Mathew [facility]    [id number]

[id number]



        20:00:00 20:15:25 Care            Liyah, Sherman [facility]  [id number]      

   ity of



                                                [place] [id number]         [place]

as



                                                DOUGLAS?BLEA [id number]         [address]



                                                MEDICAL                 



                                                OFFICE                  



                                                BUILDING                 

 

        2022 Outpatient R       LIYAH  [facility]    [id number]

[id number] [facility]



        20:00:00 20:15:25                 SHERMAN                           [facility]

 

        2022 Outpatient R       KING NARVAEZ [facility]    [id number]

[id number] [facility]



        20:00:00 20:00:00                                                 ity [facility]

 

        2022 Orders          Doctor  MICHAEL    [id number]

[address] [facility]



        00:00:00 00:00:00 Only            Unassigned, [facility]   [id number]       

  ity of



                                        [place] [facility] [place] [id number]         [place]

as



                                                        [phone]         [address]







Results







           Test Description Test Time  Test Comments Results    Result Comments 

Source









                    POCT MOLECULAR FLU  2022 01:21:48 









                      Test Item  Value      Reference Range Interpretation Comme

nts









             POCT Molecular FluA (test code = 03146-8) Negative     Negative    

              

 

             POCT Molecular FluB (test code = 49174-5) Negative     Negative    

              

 

             Lab Interpretation (test code = 01867-2) Normal                    

             



[facility] Time-based billing (NON-critical care) Time-based billing (NON-critical care) Time-based billing (NON-critical care) Time-based billing (NON-critical care) Time-based billing (NON-critical care) Resident

## 2025-03-10 NOTE — DISCHARGE NOTE PROVIDER - HOSPITAL COURSE
39-year-old male with a PMH of intellectual disability, Autism, Bipolar, Seizure from a group home present to the ED to be evaluated for lethargy. As per the AID, patient went to his program, while he was there the patient appeared very sleepy with difficulty too arousal which prompted ED visit. Upon ED arrival, the patient was lethargic, found to be acute respiratory failure with hypercapnic with pCO2 – 73 , PH-7.3 in ABG. did not tolerating bipap due to unable to cooperate, micu consulted. Later pt was saturating well in RA, patient was alert, awake, cooperative, responds to questions, back to baseline as per AID.Co2 55 on repeat abg 03/07.cTH stable. doppler LE NO DVT.TTE stable EF,RV. EEG was order but pt refused. Unable to do any blood work,ct chest as pt is refusing. Seen by pulmonary. Pt has prior documented hypercapnia.AMS/hypercapnea suspected polypharmacy. Psych was called adjusted meds.  Family reported no hx of seizure.      39-year-old male with a PMH of intellectual disability, Autism, Bipolar, Seizure from a group home present to the ED to be evaluated for lethargy. As per the AID, patient went to his program, while he was there the patient appeared very sleepy with difficulty too arousal which prompted ED visit. Upon ED arrival, the patient was lethargic, found to be acute respiratory failure with hypercapnic with pCO2 – 73 , PH-7.3 in ABG. did not tolerating bipap due to unable to cooperate, micu consulted. Later pt was saturating well in RA, patient was alert, awake, cooperative, responds to questions, back to baseline as per AID.Co2 55 on repeat abg 03/07.cTH stable. doppler LE NO DVT.TTE stable EF,RV. EEG was order but pt refused. Unable to do any blood work,ct chest as pt is refusing. Family reported no hx of seizure.  Seen by pulmonary. Pt has prior documented hypercapnia.AMS/hypercapnea suspected polypharmacy. Psych was called adjusted meds to the following:   -Abilify 20 mg qD  -Cogentin and decrease from 2 mg BID to 1 mg BID  -Clozapine 175 mg qHS  -Prozac 20 mg qHS  -Klonopin 1 mg BID PRN  -risperidone 3.5 mg qAM + qHS  -doxepin 10 mg qHS as PRN for insomnia   -Miralax 17 grams qD  -Senna 8.6 mg BID.      Patient is medically stable for discharge back to group home and can resume normal activities, diet, and meds with the above highlighted changes.     39-year-old male with a PMH of intellectual disability, Autism, Bipolar, Seizure from a group home present to the ED to be evaluated for lethargy. As per the AID, patient went to his program, while he was there the patient appeared very sleepy with difficulty too arousal which prompted ED visit. Upon ED arrival, the patient was lethargic, found to be acute respiratory failure with hypercapnic with pCO2 – 73 , PH-7.3 in ABG. did not tolerating bipap due to unable to cooperate, micu consulted. Later pt was saturating well in RA, patient was alert, awake, cooperative, responds to questions, back to baseline as per AID.Co2 55 on repeat abg 03/07.cTH stable. doppler LE NO DVT.TTE stable EF,RV. EEG was order but pt refused. Unable to do any blood work,ct chest as pt is refusing. Family reported no hx of seizure.  Seen by pulmonary. Pt has prior documented hypercapnia. AMS/hypercapnea suspected polypharmacy. Psych was called adjusted meds to the following:   -Abilify 20 mg qD  -Cogentin and decrease from 2 mg BID to 1 mg BID  -Clozapine 175 mg qHS  -Prozac 20 mg qHS  -Klonopin 1 mg BID PRN  -risperidone 3.5 mg qAM + qHS  -doxepin 10 mg qHS as PRN for insomnia   -Miralax 17 grams qD  -Senna 8.6 mg BID.        In addition, patient was started on Flomax 0.4mg for urinary retention.   Patient is medically stable for discharge back to group home and can resume normal activities, diet, and meds with the above highlighted changes. He requires a sleep study once discharged from the hospital and would need to wear a CPAP nightly.

## 2025-03-10 NOTE — DISCHARGE NOTE PROVIDER - NSDCMRMEDTOKEN_GEN_ALL_CORE_FT
ARIPiprazole 20 mg oral tablet: 1 tab(s) orally once a day  benztropine 2 mg oral tablet: 1 tab(s) orally 2 times a day  clonazePAM 1 mg oral tablet: 1  orally bid prn  cloZAPine 100 mg oral tablet: 1 tab(s) orally once a day (at bedtime)  cloZAPine 25 mg oral tablet: 3 tab(s) orally once a day (at bedtime)  doxepin 10 mg oral capsule: 1 cap(s) orally once a day  FLUoxetine 20 mg oral tablet: 1 tab(s) orally once a day  polyethylene glycol 3350 oral powder for reconstitution: 17 gram(s) orally once a day  risperiDONE 0.5 mg oral tablet: 1 tab(s) orally once a day  senna (sennosides) 8.6 mg oral tablet: 1 tab(s) orally 2 times a day   ARIPiprazole 20 mg oral tablet: 1 tab(s) orally once a day  benztropine 1 mg oral tablet: 1 tab(s) orally 2 times a day  clonazePAM 1 mg oral tablet: 1  orally bid prn  cloZAPine 100 mg oral tablet: 1 tab(s) orally once a day (at bedtime)  cloZAPine 25 mg oral tablet: 3 tab(s) orally once a day (at bedtime)  FLUoxetine 20 mg oral tablet: 1 tab(s) orally once a day  polyethylene glycol 3350 oral powder for reconstitution: 17 gram(s) orally once a day  risperiDONE 0.5 mg oral tablet: 3.5 tab(s) orally 2 times a day  senna (sennosides) 8.6 mg oral tablet: 1 tab(s) orally 2 times a day  tamsulosin 0.4 mg oral capsule: 1 cap(s) orally once a day (at bedtime)

## 2025-03-10 NOTE — PROGRESS NOTE ADULT - SUBJECTIVE AND OBJECTIVE BOX
< from: TTE W or WO Ultrasound Enhancing Agent (03.09.25 @ 14:41) >  CONCLUSIONS:      1. Left atrium is normal in size.   2. Left ventricular cavity is normal in size. Left ventricular systolic function is normal with an ejection fraction of 61 % by Pritchard's method ofdisks.   3. Normal left ventricular diastolic function.   4. The right atrium is normal in size.   5. Normal right ventricular cavity size and normal right ventricular systolic function.   6. No significant valvular disease.   7. No pericardial effusion seen.    < end of copied text >  < from: US Duplex Venous Lower Ext Complete, Bilateral (03.09.25 @ 19:45) >  IMPRESSION:  No evidence of deep venous thrombosis in either lower extremity.    < end of copied text >    < from: US Duplex Venous Lower Ext Complete, Bilateral (03.09.25 @ 19:45) >  IMPRESSION:  No evidence of deep venous thrombosis in either lower extremity.    < end of copied text >   Patient is a 39y old  Male who presents with a chief complaint of Acute Hypercapnic Respiratory Failure (10 Mar 2025 08:21)      pt is comfortable RA.denies sob,cp,cough  Group home aide at bedside-known to pt. per her,pt at his basline  Refusing blood work/imaging  REVIEW OF SYSTEMS: All systems are reviewed and found to be negative except above---not reliable    MEDICATIONS  (STANDING):  ARIPiprazole 20 milliGRAM(s) Oral daily  benztropine 1 milliGRAM(s) Oral two times a day  chlorhexidine 2% Cloths 1 Application(s) Topical daily  cloZAPine 175 milliGRAM(s) Oral at bedtime  FLUoxetine 20 milliGRAM(s) Oral at bedtime  mupirocin 2% Nasal 1 Application(s) Both Nostrils two times a day  polyethylene glycol 3350 17 Gram(s) Oral daily  risperiDONE   Tablet 3.5 milliGRAM(s) Oral two times a day  senna 1 Tablet(s) Oral two times a day  tamsulosin 0.4 milliGRAM(s) Oral at bedtime    MEDICATIONS  (PRN):  acetaminophen     Tablet .. 650 milliGRAM(s) Oral every 6 hours PRN Temp greater or equal to 38C (100.4F), Mild Pain (1 - 3)  aluminum hydroxide/magnesium hydroxide/simethicone Suspension 30 milliLiter(s) Oral every 4 hours PRN Dyspepsia  clonazePAM  Tablet 1 milliGRAM(s) Oral two times a day PRN for agitation  melatonin 3 milliGRAM(s) Oral at bedtime PRN Insomnia  ondansetron Injectable 4 milliGRAM(s) IV Push every 8 hours PRN Nausea and/or Vomiting      CAPILLARY BLOOD GLUCOSE        I&O's Summary    09 Mar 2025 07:01  -  10 Mar 2025 07:00  --------------------------------------------------------  IN: 590 mL / OUT: 1100 mL / NET: -510 mL        PHYSICAL EXAM:  Vital Signs Last 24 Hrs  T(C): 36.5 (10 Mar 2025 07:48), Max: 37.1 (09 Mar 2025 20:00)  T(F): 97.7 (10 Mar 2025 07:48), Max: 98.7 (09 Mar 2025 20:00)  HR: 71 (10 Mar 2025 08:00) (64 - 84)  BP: 120/82 (10 Mar 2025 08:00) (120/82 - 134/74)  BP(mean): 95 (10 Mar 2025 08:00) (94 - 104)  RR: 18 (10 Mar 2025 08:00) (16 - 18)  SpO2: 100% (10 Mar 2025 08:00) (94% - 100%)    Parameters below as of 10 Mar 2025 08:00  Patient On (Oxygen Delivery Method): room air        CONSTITUTIONAL: NAD,  EYES: PERRLA; conjunctiva and sclera clear  ENMT: Moist oral mucosa,   RESPIRATORY: Normal respiratory effort; lungs are clear to auscultation bilaterally  CARDIOVASCULAR: Regular rate and rhythm, normal S1 and S2, no murmur   EXTS: No lower extremity edema; Peripheral pulses are 2+ bilaterally  ABDOMEN: Nontender to palpation, normoactive bowel sounds, no rebound/guarding;   MUSCLOSKELETAL:   no joint swelling or tenderness to palpation  PSYCH: calm,place  NEUROLOGY: A+O to person, place, and not to  time; +m/s      LABS:                Urinalysis Basic - ( 08 Mar 2025 11:46 )    Color: Yellow / Appearance: Clear / S.011 / pH: x  Gluc: x / Ketone: Negative mg/dL  / Bili: Negative / Urobili: 1.0 mg/dL   Blood: x / Protein: Negative mg/dL / Nitrite: Negative   Leuk Esterase: Negative / RBC: 0 /HPF / WBC 0 /HPF   Sq Epi: x / Non Sq Epi: 0 /HPF / Bacteria: Negative /HPF          RADIOLOGY & ADDITIONAL TESTS:  Results Reviewed:                from: TTE W or WO Ultrasound Enhancing Agent (25 @ 14:41) >  CONCLUSIONS:      1. Left atrium is normal in size.   2. Left ventricular cavity is normal in size. Left ventricular systolic function is normal with an ejection fraction of 61 % by Pritchard's method ofdisks.   3. Normal left ventricular diastolic function.   4. The right atrium is normal in size.   5. Normal right ventricular cavity size and normal right ventricular systolic function.   6. No significant valvular disease.   7. No pericardial effusion seen.    < end of copied text >  < from: US Duplex Venous Lower Ext Complete, Bilateral (25 @ 19:45) >  IMPRESSION:  No evidence of deep venous thrombosis in either lower extremity.    < end of copied text >    < from: US Duplex Venous Lower Ext Complete, Bilateral (25 @ 19:45) >  IMPRESSION:  No evidence of deep venous thrombosis in either lower extremity.    < end of copied text >

## 2025-03-10 NOTE — DISCHARGE NOTE PROVIDER - ATTENDING DISCHARGE PHYSICAL EXAMINATION:
CONSTITUTIONAL: Well groomed, no apparent distress, Intellectual disability  EYES: PERRLA and symmetric, EOMI, No conjunctival or scleral injection, non-icteric  ENMT: Oral mucosa with moist membranes. Normal dentition; no pharyngeal injection or exudates             NECK: Supple, symmetric and without tracheal deviation   RESP: No respiratory distress, no use of accessory muscles; CTA b/l, no WRR  CV: RRR, +S1S2, no MRG; no JVD; no peripheral edema  GI: Soft, NT, ND, no rebound, no guarding; no palpable masses; no hepatosplenomegaly; no hernia palpated  LYMPH: No cervical LAD or tenderness; no axillary LAD or tenderness; no inguinal LAD or tenderness  MSK: Normal gait; No digital clubbing or cyanosis; examination of the (head/neck/spine/ribs/pelvis, RUE, LUE, RLE, LLE) without misalignment,            Normal ROM without pain, no spinal tenderness, normal muscle strength/tone  SKIN: No rashes or ulcers noted; no subcutaneous nodules or induration palpable  NEURO: CN II-XII intact; normal reflexes in upper and lower extremities, sensation intact in upper and lower extremities b/l to light touch   PSYCH: Appropriate insight/judgment; A+O x 3, mood and affect appropriate, recent/remote memory intact

## 2025-03-10 NOTE — CONSULT NOTE ADULT - CONSULT REASON
To assist in the ethical dilemma posed by a 39-year-old male admitted for acute hypercapnic respiratory failure, regarding surrogacy.
hypercapnic respiratory failure
Hypercapnia

## 2025-03-10 NOTE — CHART NOTE - NSCHARTNOTEFT_GEN_A_CORE
Patient ordered for nocturnal CPAP for AIME. As per notes from MD, patient is non-compliant with therapy. Patient does not tolerate. Discussed with RN. RN confirmed and patient has not been using CPAP. Device remains in the room and is currently on standby at the bedside. Patient respiratory status is currently stable.

## 2025-03-10 NOTE — PROGRESS NOTE ADULT - ASSESSMENT
39-year-old male with a PMH of intellectual disability, Autism, Bipolar, Seizure from a group home present to the ED to be evaluated for lethargy. As per the AID, patient went to his program, while he was there the patient appeared very sleepy with difficulty too arousal which prompted ED visit. Upon ED arrival, the patient was lethargic, found to be hypercapnic with pCO2 – 73 in ABG. not tolerating bipap due to unable to cooperate, thus micu consulted. Later pt was saturating well in RA, patient was alert, awake, cooperative, responds to questions, back to baseline as per AID.     Acute on chronic hypercapnic respiratory failure  -pt is  off oxygen. so2 stable am  -Likely polypharmacy, r/o seizure  -CTH stable   -Co2 55 on repeat abg 03/07  -Mental status back to baseline   -Bicarb is elevated, and was elevated in 2022. suspect some degree of chronic hypercapnea  - EEG ,pt refused  - pt was not on depakote  - meds adjusting per  psych   - cxr stable. ct chest r/o pna-pt is refusing ct  - stable viral panel  -tte stable ef.nl RV  -Doppler LE NEG DVT  -seen by pulmonary  -f/u  psych rec        seizure disorder   intellectual disability, anxiety, bipolar  -seizure precaution  -eeg, pt refused  -not on any seizure meds at home  -aspiration precaution    disposition: Acute. pending work up  KIRAN SIMS     39-year-old male with a PMH of intellectual disability, Autism, Bipolar, Seizure from a group home present to the ED to be evaluated for lethargy. As per the AID, patient went to his program, while he was there the patient appeared very sleepy with difficulty too arousal which prompted ED visit. Upon ED arrival, the patient was lethargic, found to be hypercapnic with pCO2 – 73 in ABG. not tolerating bipap due to unable to cooperate, thus micu consulted. Later pt was saturating well in RA, patient was alert, awake, cooperative, responds to questions, back to baseline as per AID.     Acute on chronic hypercapnic respiratory failure  -pt is off oxygen. so2 stable   -Likely polypharmacy, r/o seizure  -CTH stable   -Co2 55 on repeat abg 03/07  -Mental status back to baseline   -Bicarb is elevated, and was elevated in 2022. suspect some degree of chronic hypercapnea  - EEG ,pt refused  - pt was not on depakote  - meds adjusting per  psych   - cxr stable. cta chest-pt refusing  - stable viral panel  -tte stable ef.nl RV  -Doppler LE NEG DVT  -seen by pulmonary  -f/u  psych rec        seizure disorder   intellectual disability, anxiety, bipolar  -seizure precaution  -eeg, pt refused  -not on any seizure meds at home  -aspiration precaution    disposition: Acute. pending work up  KIRAN ethics team-will f/u rec  KIRAN SIMS     39-year-old male with a PMH of intellectual disability, Autism, Bipolar, Seizure from a group home present to the ED to be evaluated for lethargy. As per the AID, patient went to his program, while he was there the patient appeared very sleepy with difficulty too arousal which prompted ED visit. Upon ED arrival, the patient was lethargic, found to be hypercapnic with pCO2 – 73 in ABG. not tolerating bipap due to unable to cooperate, thus micu consulted. Later pt was saturating well in RA, patient was alert, awake, cooperative, responds to questions, back to baseline as per AID.     Acute on chronic hypercapnic respiratory failure  -pt is off oxygen. so2 stable   -Likely polypharmacy, r/o seizure  -CTH stable   -Co2 55 on repeat abg 03/07  -Mental status back to baseline   -Bicarb is elevated, and was elevated in 2022. suspect some degree of chronic hypercapnea  - EEG ,pt refused  - pt was not on depakote  - meds adjusting per  psych   - cxr stable. cta chest-pt refusing  - stable viral panel  -tte stable ef.nl RV  -Doppler LE NEG DVT  -seen by pulmonary  -f/u  psych rec        seizure disorder   intellectual disability, anxiety, bipolar  -seizure precaution  -eeg, pt refused  -not on any seizure meds at home  -aspiration precaution    disposition: Acute. pending work up  DW ethics team- Ethic spoke with Aunt Gertrude Mckenna: [phone] who will help pt to go for the imaging and blood work   Called Aunt left msg. Notified Ethics.  KIRAN SIMS     39-year-old male with a PMH of intellectual disability, Autism, Bipolar, Seizure from a group home present to the ED to be evaluated for lethargy. As per the AID, patient went to his program, while he was there the patient appeared very sleepy with difficulty too arousal which prompted ED visit. Upon ED arrival, the patient was lethargic, found to be hypercapnic with pCO2 – 73 in ABG. not tolerating bipap due to unable to cooperate, thus micu consulted. Later pt was saturating well in RA, patient was alert, awake, cooperative, responds to questions, back to baseline as per AID.     Acute  hypercapnic respiratory failure  Acute metabolic encephalopathy     -Likely polypharmacy, r/o seizure  --pt is off oxygen. so2 stable   -CTH stable   -Co2 55 on repeat abg 03/07  -Mental status back to baseline   -Bicarb is elevated, and was elevated in 2022. suspect some degree of chronic hypercapnia  - EEG ,pt refused  - pt was not on depakote  - meds adjusting per  psych   - cxr stable. cta chest-pt refusing  - stable viral panel  -tte stable ef.nl RV  -Doppler LE NEG DVT  -seen by pulmonary. f/u rec  -f/u  psych rec        Intellectual disability, anxiety, bipolar  NO history of seizure per Aunt  -seizure precaution  -eeg, pt refused  -not on any seizure meds at home      disposition: Acute. pending work up  DW ethics team- Ethic spoke with Aunt Gertrude Mckenna: [phone] who will help pt to go for the imaging and blood work   DW Aunt-Per Aunt,pt never had any seizure.She will come tomorrow and convince pt to go for CT,EEG/Blood work.   KIRAN RN     39-year-old male with a PMH of intellectual disability, Autism, Bipolar, Seizure from a group home present to the ED to be evaluated for lethargy. As per the AID, patient went to his program, while he was there the patient appeared very sleepy with difficulty too arousal which prompted ED visit. Upon ED arrival, the patient was lethargic, found to be hypercapnic with pCO2 – 73 in ABG. not tolerating bipap due to unable to cooperate, thus micu consulted. Later pt was saturating well in RA, patient was alert, awake, cooperative, responds to questions, back to baseline as per AID.     Acute  hypercapnic respiratory failure  Acute metabolic encephalopathy     -Likely polypharmacy, r/o seizure  --pt is off oxygen. so2 stable   -CTH stable   -Co2 55 on repeat abg 03/07  -Mental status back to baseline   -Bicarb is elevated, and was elevated in 2022. suspect some degree of chronic hypercapnia  - EEG ,pt refused  - pt was not on depakote  - meds adjusting per  psych   - cxr stable. cta chest-pt refusing  - stable viral panel  -tte stable ef.nl RV  -Doppler LE NEG DVT  -seen by pulmonary. f/u rec  -f/u  psych rec        Intellectual disability, anxiety, bipolar  NO history of seizure per Aunt  -seizure precaution  -eeg, pt refused  -not on any seizure meds at home    Constipation  -Senna,Miralax  -positive BM  -Dulcolax       disposition: Acute. pending work up  KIRAN ethics team- Ethic spoke with Aunt Gertrude Mckenna: [phone] who will help pt to go for the imaging and blood work   DW Aunt-Per Aunt,pt never had any seizure.She will come tomorrow and convince pt to go for CT,EEG/Blood work.   KIRAN RN

## 2025-03-10 NOTE — CONSULT NOTE ADULT - ASSESSMENT
Conclusion: Mr. Mckenna currently has an available legal surrogate, his aunt, Gertrude Mckenna, who is capable of making appropriate decisions for him, including medical decisions, EXCEPT FOR WITHHOLDING OR WITHDRAWING OF LIFE SUSTAINING TREATMENTS, which would require the involvement of [facility] and MHLS. Ethics and Palliative Care can assist with this process if necessary.     Ethics will remain available for further conversation about the patient’s current condition and decision points that may occur.     Thank you for including the Ethics Consultation Service. Ethics commends the team for their virtue in the care and support for Mr. Mckenna.       Case discussed with Ethics Attending: Mikayla Mitchell MS, MD, MPH, [facility] ( of Medical Ethics)     More than 50% of the time of this consultation was spent in coordination of Care of Patient       References   (i)14 CRR-.11 (2020).   (ii)Thu BAHENA & Jake JF. Principles of Biomedical Ethics. [place]: [employer]; 2019.  Conclusion: Mr. Mckenna currently has an available legal surrogate, his aunt, Gertrude Mckenna, who is capable of making appropriate decisions for him, including medical decisions, EXCEPT FOR WITHHOLDING OR WITHDRAWING OF LIFE SUSTAINING TREATMENTS, which would require the involvement of [facility] and MHLS. Ethics and Palliative Care can assist with this process if necessary.     Ethics will remain available for further conversation about the patient’s current condition and decision points that may occur.     Thank you for including the Ethics Consultation Service. Ethics commends the team for their virtue in the care and support for Mr. Mckenna.     Case discussed with Ethics Attending: Mikayla Mitchell MS, MD, MPH, [facility] ( of Medical Ethics)   More than 50% of the time of this consultation was spent in coordination of Care of Patient       References   (i)14 CRR-.11 (2020).   (ii)Thu BAHENA & Jake JF. Principles of Biomedical Ethics. [place], [place]: [employer]; 2019.

## 2025-03-10 NOTE — CONSULT NOTE ADULT - SUBJECTIVE AND OBJECTIVE BOX
Consult requested by: JOÃO Anthony MD      Role: Attending	Service: Medicine   Attending: JOÃO Anthony MD, Medicine	   Consultant: LUIS M WELCH RN, MBE (Ethics Fellow)   Contact #s: [phone] (Fellow Cell) [phone] (Office)   Consult purpose: To assist in the ethical dilemma posed by a 39-year-old male admitted for acute hypercapnic respiratory failure, regarding surrogacy.     Clinical summary: This is a 39-year-old patient with a medical history of autism, bipolar disorder, and seizure disorder who presented from his group home to the Emergency Department on 3/7/2025 for lethargy and altered mental status. Per a group home aide, the patient was at his day program when he appeared extremely lethargic and difficult to arouse, prompting transport to the ED. Upon arrival, the patient was found to be hypercapnic with a pCO2 of 73 and initially placed on BiPAP, though was non-adherent and unable to tolerate. MICU was consulted on 3/7/2025 as a result of the patient’s refusal of BiPAP, recommending initiation of high flow nasal cannula and a Pulmonology consult, noting no need for ICU level of care. On repeat evaluation in the ED, the patient’s mentation improved, as did his oxygen saturation on room air, and was more cooperative with care. A chest x-ray showed no radiographic evidence of active chest disease, and a CT scan of the head showed no acute intracranial hemorrhage, mass effect, or midline shift. The patient was admitted to the Medicine service on 3/7/2025 for further workup and management of acute hypercapnic respiratory failure. Pulmonology evaluated the patient on 3/8/2025, noting the patient may have a history of chronic hypercapnia (dating back to ), though it is unclear without further testing/evaluation consisting of diaphragmatic sniff test, MRI brain, CT chest, and polysomnography, of which the patient was determined unlikely to comply with. Pulmonology recommended the primary team reconsult with any findings from any additional studies obtained. On 3/8/2025, Behavioral Health evaluated the patient, noting he had poor insight and judgment and providing psychiatric medication recommendations/adjustments. On 3/9/2025, the patient was noted to have refused an electroencephalogram (EEG) and chest CT to further evaluate etiology of hypercapnia, as well as labs and some medications. MICU, Pulmonology, and Behavioral Health on consult. Ethics consult initiated to determine the appropriate surrogate decision maker for a patient under the auspices of the Office for People with Developmental Disabilities (OPWDD).     Prognosis Estimate (survival in hrs, days, wks, mos, yrs): In progress   Patient Decision-Making Capacity: Lacks capacity, due to developmental disability   Patient Aware of: Diagnosis: No	Prognosis: No   Name of medical decision-maker should patient lack capacity (relationship): Gertrude Mckenna (patient’s aunt)   Role: Legal Surrogate	Contact #(s): [phone] (c) / [phone] (h)   Other Decision-Maker (I.e., HCA or Surrogate) Aware of: Diagnosis: Yes   Prognosis: Yes   Other Stakeholders: [facility] ([phone]); Agustin Mejias (); Juan Antonio Dia RN (Group Home Nurse, [phone]); Smiley (grandmother); uncle; nephew; niece; OPWDD; MHLS   Evidence of Patient’s Preference of Life Sustaining Treatment (Written or Oral): None   Resuscitation status: DNR: No	  DNI: No     Discussions:   Case discussed in detail with LUIS M WELCH RN, MBE (Ethics Fellow) and JOÃO Anthony MD (Medicine Attending) on 3/10/2025 from [id number]: Ethics informed that the patient is from a group home, under the auspices of [facility]. Per Dr. Anthony, the patient has been refusing most medical interventions, and a surrogate decision maker has yet to be appropriately identified. Ethics assistance requested.     Discussion with LUIS M WELCH RN, MBE (Ethics Fellow) and ANGEL Gregory LMSW () on 3/10/2025 from [phone]: ANGEL Gregory LMSW informed the fellow that the patient’s  reported he has a health care proxy (HCP) in place naming his aunt as his health care agent. Per ANGEL Gregory, [facility] is to send a copy today.     Discussion with LUIS M WELCH RN, ABI (Ethics Fellow) and the patient at the bedside on 3/10/2025 from 5022-0987: The fellow introduced herself to the patient who was resting comfortably in bed. The patient’s group home aide was at bedside and assisted with engaging the patient in discussion. He was alert and oriented to person but was unable to recall where he was or how he came to be admitted to [facility]. He confirmed that he has an aunt, uncle, grandmother, and niece but was unable to do more than recall their names. When the fellow attempted to engage the patient in further conversation he did not respond, turning his back and declining to speak further.     Discussion with LUIS M WELCH RN, MBE (Ethics Fellow) and Agustin Randell () on 3/10/2025 from [phone]: Mr. Mejias informed the fellow that there is no HCP on file, as was previously believed. However, Mr. Mejias reported that the patient’s aunt, Gertrude, is the primary point of contact and is the one who usually consents on the patient’s behalf.      Discussion with LUIS M WELCH RN, ABI (Ethics Fellow) and Gertrude Mckenna (patient’s aunt, [phone]) on 3/10/2025 from [phone]: The fellow introduced herself and the role of ethics. Ms. Mckenna expressed her concerns regarding the communication with the patient’s group home and indicated her desire for improved communication during the patient's admission. The fellow acknowledged her concerns and assured Ms. Mckenna of her commitment to facilitating effective communication between Ms. Mckenna and the medical team throughout this hospitalization. Ms. Mckenna shared that the patient’s parents are , and he has a grandmother residing in a nursing facility. The remaining family members include herself, her , and their two children, with Ms. Mckenna serving as the primary point of contact for the patient and often providing consent on his behalf. She provided relevant medical history, noting that the patient can ambulate independently, although he has experienced difficulty standing at his full height following a COVID-19 infection in . Additionally, he has been prescribed a helmet due to a history of running into walls when bent over. Ms. Mckenna also reported that the patient sometimes has difficulty swallowing, particularly if his food is not cut into small pieces or if he is not seated in an appropriate chair during meals. She mentioned that he enjoys playing games such as Trouble and Tic Tac Toe, as well as engaging with and watching sports, especially the Yankees and Giants. The fellow informed Ms. Mckenna that the patient has been intermittently refusing medical interventions, to which she responded that she could assist, as she has experience managing his anxiety in hospital settings in the past. She lives approximately one hour away and is available for support both in person and via phone if she is unable to leave work during the week. The fellow communicated that she would request the attending physician to reach out and provide an update, and she will remain available for any other questions or concerns that may arise. Ms. Mckenna expressed her gratitude.     Discussion with LUIS M WELCH RN, ABI (Ethics Fellow) and JOÃO Anthony MD (Medicine Attending) on 3/10/2025 from 8212-5469: The fellow informed Dr. Anthony of the aforementioned discussion and requested she reach out to the patient’s aunt to provide an update.     Discussion with LUIS M WELCH RN, ABI (Ethics Fellow) and Gertrude Mckenna (patient’s aunt, [phone]) on 3/10/2025 from [phone]: Ms. Mckenna informed the fellow that she spoke with Dr. Anthony and plans to visit [facility] tomorrow morning to visit the patient and speak with the medical team and nursing. The fellow confirmed she will meet with Ms. Mckenna during her visit.       Bioethics analysis: The central ethical issues in this case are patient autonomy, beneficence, nonmaleficence and justice.     The conflict in this case is that between patient autonomy and provider beneficence. As Mr. Mckenna is developmentally disabled, his autonomy is represented by his legal surrogate, his aunt, Gertrude Mckenna. Provider virtue poses the legitimate question of whether she is the appropriate decision maker for all decisions, including end of life. This creates conflict when providers wish to intervene acting on the here opposing principle of beneficence, which urges the provider to act in the patient’s “best interest.” In this case, Gertrude is actively involved in the patient’s life and medical care. Therefore, she has the power to make all medical decisions EXCEPT withholding or withdrawing of life sustaining treatments without involving OPWDD and MHLS.     According to the [facility] regulations regarding persons with developmental disability for consent to treatment state that if a patient is:     eighteen (18) years of age or older and;   lacks capacity to understand appropriate disclosures regarding proposed medical treatment or;   a determination of insufficient capacity has been made;     The medical team should seek informed consent to the proposed treatment from one of the surrogates below, in that order:     a guardian lawfully empowered to give such consent or the person's duly appointed health care agent or alternative agent   an actively involved spouse   an actively involved parent   an actively involved adult child   an actively involved adult sibling   an actively involved adult family member   the Consumer Advisory Board for the [place] (only for class members it fully represents)   a surrogate decision-making committee (SDMC) or a court of competent jurisdiction (i).     In this case, the patient's surrogate is his aunt, Gertrude Mckenna. As such, the team should engage with her for medical decisions. IF advanced directives need to be addressed, the team would need to complete the process with OPWDD and MHLS. Ethics and Palliative Care can assist, if necessary.     If the reasonably available and willing surrogate listed above object to proposed treatments, and the agency considers the proposed treatment to be in the best interests of the person, application may be made to a court of competent jurisdiction or, if the surrogate does not object to an SDMC proceeding, to the SDMC. Notice of any such application shall be given to the objecting party (i).     The clinician’s domain of virtue is the source of the bioethical principles of beneficence and nonmaleficence–both of which should be aimed to honor a patient’s moral status: wanting to help a patient live optimally, while avoiding inflicting harm (which may be experienced psycho-spiritually and physically) (ii). Thus, both the health care team and the patient’s family must show respect for Mr. Mckenna as a person with dignity and a sense of self, he has an innate MORAL STATUS – that is, his distinct personhood, personal experience and interpretation of suffering, life-story, etc. – independent of his legal surrogate, which must be respected.     In this case, the health team is applauded for their virtue and invoking justice – by providing fairness and equitable care to a patient in a vulnerable population.  Consult requested by: JOÃO Anthony MD      Role: Attending	Service: Medicine   Consultant: LUIS M WELCH RN, MBE (Ethics Fellow)   Contact #s: [phone] (Fellow Cell) [phone] (Office)   Consult purpose: To assist in the ethical dilemma posed by a 39-year-old male admitted for acute hypercapnic respiratory failure, regarding surrogacy.     Clinical summary: This is a 39-year-old patient with a medical history of autism, bipolar disorder, and seizure disorder who presented from his group home to the Emergency Department on 3/7/2025 for lethargy and altered mental status. Per a group home aide, the patient was at his day program when he appeared extremely lethargic and difficult to arouse, prompting transport to the ED. Upon arrival, the patient was found to be hypercapnic with a pCO2 of 73 and initially placed on BiPAP, though was non-adherent and unable to tolerate. MICU was consulted on 3/7/2025 as a result of the patient’s refusal of BiPAP, recommending initiation of high flow nasal cannula and a Pulmonology consult, noting no need for ICU level of care. On repeat evaluation in the ED, the patient’s mentation improved, as did his oxygen saturation on room air, and was more cooperative with care. A chest x-ray showed no radiographic evidence of active chest disease, and a CT scan of the head showed no acute intracranial hemorrhage, mass effect, or midline shift. The patient was admitted to the Medicine service on 3/7/2025 for further workup and management of acute hypercapnic respiratory failure. Pulmonology evaluated the patient on 3/8/2025, noting the patient may have a history of chronic hypercapnia (dating back to ), though it is unclear without further testing/evaluation consisting of diaphragmatic sniff test, MRI brain, CT chest, and polysomnography, of which the patient was determined unlikely to comply with. Pulmonology recommended the primary team reconsult with any findings from any additional studies obtained. On 3/8/2025, Behavioral Health evaluated the patient, noting he had poor insight and judgment and providing psychiatric medication recommendations/adjustments. On 3/9/2025, the patient was noted to have refused an electroencephalogram (EEG) and chest CT to further evaluate etiology of hypercapnia, as well as labs and some medications. MICU, Pulmonology, and Behavioral Health on consult. Ethics consult initiated to determine the appropriate surrogate decision maker for a patient under the auspices of the Office for People with Developmental Disabilities (OPWDD).     Prognosis Estimate (survival in hrs, days, wks, mos, yrs): In progress   Patient Decision-Making Capacity: Lacks capacity, due to developmental disability   Patient Aware of: Diagnosis: No	Prognosis: No   Name of medical decision-maker should patient lack capacity (relationship): Gertrude Mckenna (patient’s aunt)   Role: Legal Surrogate	Contact #(s): [phone] (c) / [phone] (h)   Other Decision-Maker (I.e., HCA or Surrogate) Aware of: Diagnosis: Yes   Prognosis: Yes   Other Stakeholders: [facility] ([phone]); Agustin Mejias (); Juan Antonio Dia RN (Group Home Nurse, [phone]); Smiley (grandmother); uncle; nephew; niece; OPWDD; MHLS   Evidence of Patient’s Preference of Life Sustaining Treatment (Written or Oral): None   Resuscitation status: DNR: No	  DNI: No     Discussions:   Discussion with LUIS M WELCH RN, MBE (Ethics Fellow) and JOÃO Anthony MD (Medicine Attending) on 3/10/2025 from [phone]: Case discussed in detail. Ethics informed that [facility] Per [facility] Anthony, the patient has been refusing most medical interventions, and a surrogate decision maker has yet to be appropriately identified. Ethics assistance requested.     Discussion with LUIS M WELCH RN, MBE (Ethics Fellow) and ANGEL Gregory LMSW () on 3/10/2025 from [phone]: ANGEL Gregory LMSW informed the fellow that the patient’s  reported he has a health care proxy (HCP) in place naming his aunt as his health care agent. Per ANGEL Gregory, [facility] is to send a copy today.     Discussion with LUIS M WELCH RN, ABI (Ethics Fellow) and the patient at the bedside on 3/10/2025 from 6041-2812: The fellow introduced herself to the patient who was resting comfortably in bed. The patient’s group home aide was at bedside and assisted with engaging the patient in discussion. He was alert and oriented to person but was unable to recall where he was or how he came to be admitted to [facility]. He confirmed that he has an aunt, uncle, grandmother, and niece but was unable to do more than recall their names. When the fellow attempted to engage the patient in further conversation he did not respond, turning his back and declining to speak further.     Discussion with LUIS M WELCH RN, MBSOFIYA (Ethics Fellow) and Agustin Randell () on 3/10/2025 from [phone]: Mr. Mejias informed the fellow that there is no HCP on file, as was previously believed. However, Mr. Mejias reported that the patient’s aunt, Gertrude, is the primary point of contact and is the one who usually consents on the patient’s behalf.      Discussion with LUIS M WELCH RN, ABI (Ethics Fellow) and Gertrude Mckenna (patient’s aunt, [phone]) on 3/10/2025 from 9751-1176: The fellow introduced herself and the role of ethics. Ms. Mckenna expressed her concerns regarding the communication with the patient’s group home and indicated her desire for improved communication during the patient's admission. The fellow acknowledged her concerns and assured Ms. Mckenna of her commitment to facilitating effective communication between Ms. Mckenna and the medical team throughout this hospitalization. Ms. Mckenna shared that the patient’s parents are , and he has a grandmother residing in a nursing facility. The remaining family members include herself, her , and their two children, with Ms. Mckenna serving as the primary point of contact for the patient and often providing consent on his behalf. She provided relevant medical history, noting that the patient can ambulate independently, although he has experienced difficulty standing at his full height following a COVID-19 infection in . Additionally, he has been prescribed a helmet due to a history of running into walls when bent over. Ms. Mckenna also reported that the patient sometimes has difficulty swallowing, particularly if his food is not cut into small pieces or if he is not seated in an appropriate chair during meals. She mentioned that he enjoys playing games such as Trouble and Tic Tac Toe, as well as engaging with and watching sports, especially the Yankees and Giants. The fellow informed Ms. Mckenna that the patient has been intermittently refusing medical interventions, to which she responded that she could assist, as she has experience managing his anxiety in hospital settings in the past. She lives approximately one hour away and is available for support both in person and via phone if she is unable to leave work during the week. The fellow communicated that she would request the attending physician to reach out and provide an update, and she will remain available for any other questions or concerns that may arise. Ms. Mckenna expressed her gratitude.     Discussion with LUIS M WELCH RN, ABI (Ethics Fellow) and JOÃO Anthony MD (Medicine Attending) on 3/10/2025 from 8721-6297: The fellow informed Dr. Anthony of the aforementioned discussion and requested she reach out to the patient’s aunt to provide an update.     Discussion with LUIS M WELCH RN, MBE (Ethics Fellow) and Gertrude Mckenna (patient’s aunt, [phone]) on 3/10/2025 from 5829-0324: Ms. Mckenna informed the fellow that she spoke with Dr. Anthony and plans to visit [facility] tomorrow morning to visit the patient and speak with the medical team and nursing. The fellow confirmed she will meet with Ms. Mckenna during her visit.     Bioethics analysis: The central ethical issues in this case are patient autonomy, beneficence, nonmaleficence and justice.     The conflict in this case is that between patient autonomy and provider beneficence. As Mr. Mckenna is developmentally disabled, his autonomy is represented by his legal surrogate, his aunt, Getrrude Mckenna. Provider virtue poses the legitimate question of whether she is the appropriate decision maker for all decisions, including end of life. This creates conflict when providers wish to intervene acting on the here opposing principle of beneficence, which urges the provider to act in the patient’s “best interest.” In this case, Gertrude is actively involved in the patient’s life and medical care. Therefore, she has the power to make all medical decisions EXCEPT withholding or withdrawing of life sustaining treatments without involving OPWDD and MHLS.     According to the [facility] regulations regarding persons with developmental disability for consent to treatment state that if a patient is:     eighteen (18) years of age or older and;   lacks capacity to understand appropriate disclosures regarding proposed medical treatment or;   a determination of insufficient capacity has been made;     The medical team should seek informed consent to the proposed treatment from one of the surrogates below, in that order:     a guardian lawfully empowered to give such consent or the person's duly appointed health care agent or alternative agent   an actively involved spouse   an actively involved parent   an actively involved adult child   an actively involved adult sibling   an actively involved adult family member   the Consumer Advisory Board for the [employer] (only for class members it fully represents)   a surrogate decision-making committee (SDMC) or a court of competent jurisdiction (i).     In this case, the patient's surrogate is his aunt, Gertrude Mckenna. As such, the team should engage with her for medical decisions. IF advanced directives need to be addressed, the team would need to complete the process with OPWDD and MHLS. Ethics and Palliative Care can assist, if necessary.     If the reasonably available and willing surrogate listed above object to proposed treatments, and the agency considers the proposed treatment to be in the best interests of the person, application may be made to a court of competent jurisdiction or, if the surrogate does not object to an SDMC proceeding, to the SDMC. Notice of any such application shall be given to the objecting party (i).     The clinician’s domain of virtue is the source of the bioethical principles of beneficence and nonmaleficence–both of which should be aimed to honor a patient’s moral status: wanting to help a patient live optimally, while avoiding inflicting harm (which may be experienced psycho-spiritually and physically) (ii). Thus, both the health care team and the patient’s family must show respect for Mr. Mckenna as a person with dignity and a sense of self, he has an innate MORAL STATUS – that is, his distinct personhood, personal experience and interpretation of suffering, life-story, etc. – independent of his legal surrogate, which must be respected.     In this case, the health team is applauded for their virtue and invoking justice – by providing fairness and equitable care to a patient in a vulnerable population.

## 2025-03-11 ENCOUNTER — TRANSCRIPTION ENCOUNTER (OUTPATIENT)
Age: 40
End: 2025-03-11

## 2025-03-11 VITALS
HEART RATE: 80 BPM | TEMPERATURE: 97 F | SYSTOLIC BLOOD PRESSURE: 110 MMHG | RESPIRATION RATE: 18 BRPM | OXYGEN SATURATION: 97 % | DIASTOLIC BLOOD PRESSURE: 82 MMHG

## 2025-03-11 PROCEDURE — 99239 HOSP IP/OBS DSCHRG MGMT >30: CPT

## 2025-03-11 PROCEDURE — 87637 SARSCOV2&INF A&B&RSV AMP PRB: CPT

## 2025-03-11 PROCEDURE — 84295 ASSAY OF SERUM SODIUM: CPT

## 2025-03-11 PROCEDURE — 81001 URINALYSIS AUTO W/SCOPE: CPT

## 2025-03-11 PROCEDURE — 87641 MR-STAPH DNA AMP PROBE: CPT

## 2025-03-11 PROCEDURE — 82803 BLOOD GASES ANY COMBINATION: CPT

## 2025-03-11 PROCEDURE — 82140 ASSAY OF AMMONIA: CPT

## 2025-03-11 PROCEDURE — 71045 X-RAY EXAM CHEST 1 VIEW: CPT

## 2025-03-11 PROCEDURE — 95700 EEG CONT REC W/VID EEG TECH: CPT

## 2025-03-11 PROCEDURE — 85014 HEMATOCRIT: CPT

## 2025-03-11 PROCEDURE — 93970 EXTREMITY STUDY: CPT

## 2025-03-11 PROCEDURE — 80053 COMPREHEN METABOLIC PANEL: CPT

## 2025-03-11 PROCEDURE — 95714 VEEG EA 12-26 HR UNMNTR: CPT

## 2025-03-11 PROCEDURE — 94760 N-INVAS EAR/PLS OXIMETRY 1: CPT

## 2025-03-11 PROCEDURE — 70450 CT HEAD/BRAIN W/O DYE: CPT | Mod: MC

## 2025-03-11 PROCEDURE — 85018 HEMOGLOBIN: CPT

## 2025-03-11 PROCEDURE — 85025 COMPLETE CBC W/AUTO DIFF WBC: CPT

## 2025-03-11 PROCEDURE — 87640 STAPH A DNA AMP PROBE: CPT

## 2025-03-11 PROCEDURE — 82947 ASSAY GLUCOSE BLOOD QUANT: CPT

## 2025-03-11 PROCEDURE — 94660 CPAP INITIATION&MGMT: CPT

## 2025-03-11 PROCEDURE — 84132 ASSAY OF SERUM POTASSIUM: CPT

## 2025-03-11 PROCEDURE — 99223 1ST HOSP IP/OBS HIGH 75: CPT

## 2025-03-11 PROCEDURE — 82435 ASSAY OF BLOOD CHLORIDE: CPT

## 2025-03-11 PROCEDURE — 99285 EMERGENCY DEPT VISIT HI MDM: CPT

## 2025-03-11 PROCEDURE — 96372 THER/PROPH/DIAG INJ SC/IM: CPT

## 2025-03-11 PROCEDURE — 83605 ASSAY OF LACTIC ACID: CPT

## 2025-03-11 PROCEDURE — 93005 ELECTROCARDIOGRAM TRACING: CPT

## 2025-03-11 PROCEDURE — 93306 TTE W/DOPPLER COMPLETE: CPT

## 2025-03-11 PROCEDURE — 82330 ASSAY OF CALCIUM: CPT

## 2025-03-11 RX ORDER — BENZTROPINE MESYLATE 2 MG
1 TABLET ORAL
Qty: 60 | Refills: 0
Start: 2025-03-11 | End: 2025-04-09

## 2025-03-11 RX ORDER — BENZTROPINE MESYLATE 2 MG
1 TABLET ORAL
Refills: 0 | DISCHARGE

## 2025-03-11 RX ORDER — TAMSULOSIN HYDROCHLORIDE 0.4 MG/1
1 CAPSULE ORAL
Qty: 30 | Refills: 0
Start: 2025-03-11 | End: 2025-04-09

## 2025-03-11 RX ORDER — RISPERIDONE 4 MG
1 TABLET ORAL
Refills: 0 | DISCHARGE

## 2025-03-11 RX ORDER — RISPERIDONE 4 MG
3.5 TABLET ORAL
Qty: 210 | Refills: 0
Start: 2025-03-11 | End: 2025-04-09

## 2025-03-11 RX ORDER — RISPERIDONE 4 MG
7 TABLET ORAL
Qty: 0 | Refills: 0 | DISCHARGE
Start: 2025-03-11

## 2025-03-11 RX ADMIN — ARIPIPRAZOLE 20 MILLIGRAM(S): 2 TABLET ORAL at 12:43

## 2025-03-11 RX ADMIN — Medication 1 TABLET(S): at 06:03

## 2025-03-11 RX ADMIN — MUPIROCIN CALCIUM 1 APPLICATION(S): 20 CREAM TOPICAL at 06:09

## 2025-03-11 RX ADMIN — Medication 1 MILLIGRAM(S): at 06:03

## 2025-03-11 RX ADMIN — Medication 1 APPLICATION(S): at 12:44

## 2025-03-11 RX ADMIN — POLYETHYLENE GLYCOL 3350 17 GRAM(S): 17 POWDER, FOR SOLUTION ORAL at 12:44

## 2025-03-11 RX ADMIN — Medication 3.5 MILLIGRAM(S): at 06:03

## 2025-03-11 NOTE — DISCHARGE NOTE NURSING/CASE MANAGEMENT/SOCIAL WORK - NSDCVIVACCINE_GEN_ALL_CORE_FT
Tdap; 09-Sep-2022 12:20; Lashaun Lorenzo (RN); [employer]; [id number] (Exp. Date: 14-Oct-2024); IntraMuscular; Deltoid Left.; 0.5 milliLiter(s); VIS (VIS Published: 09-May-2013, VIS Presented: 09-Sep-2022);

## 2025-03-11 NOTE — DISCHARGE NOTE NURSING/CASE MANAGEMENT/SOCIAL WORK - PATIENT PORTAL LINK FT
You can access the FollowMyHealth Patient Portal offered by [facility] by registering at the following website: [email or web address]. By joining [employer]’s FollowMyHealth portal, you will also be able to view your health information using other applications (apps) compatible with our system.

## 2025-03-11 NOTE — PROGRESS NOTE ADULT - REASON FOR ADMISSION
Acute Hypercapnic Respiratory Failure

## 2025-03-11 NOTE — DISCHARGE NOTE NURSING/CASE MANAGEMENT/SOCIAL WORK - NSDCPEFALRISK_GEN_ALL_CORE
For information on Fall & Injury Prevention, visit: [email or web address] OR  [email or web address] OR  https://www.cdc.gov/steadi/patient.html

## 2025-03-11 NOTE — PROGRESS NOTE ADULT - ASSESSMENT
Code Status: Aunt Gertrude Ny is legal surrogate.  Patient is a 39y Male with PMHx Bipolar I d/o, schizophrenia,  Seizure d/o, intellectual delay, anxiety admitted for AHRF 2/2 AIME.      #CO2 Narcosis   -CTH neg for acute infarct or masses  -infectious w/u neg  -TTE w/ LVEF 61% + normal diastolic function  -Admitting ABG 7.3/73/75/36  -ABG with CPAP improved to 7.4/55/133/34  -patient not compliant with hosp CPAP  -follow up with home sleep study   -rec nightly CPAP adherence       #Bipolar I d/o  #Schizophrenia d/o  -benztropine 1mg BID  -Abilify 20mg daily   -clozapine 175mg bedtime   -risperidone 3.5mg BID       #Anxiety   -fluoxetine 20mg bedtime   -Klonopin 1mg BID prn       #Obstructive nephropathy   -tamsulosin 0.4mg bedtime     #MRSA positive   -mupirocin 2% nasal application          Code Status: Aunt Gertrude Ny is legal surrogate.   Dispo: Return to group home 3/11

## 2025-03-11 NOTE — DISCHARGE NOTE NURSING/CASE MANAGEMENT/SOCIAL WORK - FINANCIAL ASSISTANCE
[facility] provides services at a reduced cost to those who are determined to be eligible through [facility]’s financial assistance program. Information regarding [facility]’s financial assistance program can be found by going to [email or web address] or by calling [phone].

## 2025-03-11 NOTE — PROGRESS NOTE ADULT - SUBJECTIVE AND OBJECTIVE BOX
HPI  Patient is a 39y Male with PMHx Bipolar I d/o, Seizure d/o, intellectual delay, anxiety  admitted for AHRF 2/2 AIME.      INTERVAL EVENTS    HOSPITAL COURSE  Pt BIBEMS 3/07 from adult day care for lethargy + headache, VSS in triage, requiring NIV for sleep hypoxia. CXR neg for acute disease. MICU consulted for hypercapnic resp failure with ABG showing CO2 of 73, rec HIFLOW + CTH + EEG. 3/08, pulm consulted, rec BIPAP. Psych consulted, rec re-start of meds. 3/09, MRSA swab positive.     REVIEW OF SYSTEMS   General: No fatigue, decreased apatite, weight loss  HEENT: No cough, throat pain, rhinorrhea hemoptysis    Chest: No shortness of breath, chest pain  Abdomen: No abdominal pain, hematemesis   Genitourinary: No dysuria, No hematuria   Extremities: No joint pain, no change in range of motion  Skin: No rashes, ecchymoses purpura, nodules       MEDICATIONS  MEDICATIONS  (STANDING):  ARIPiprazole 20 milliGRAM(s) Oral daily  benztropine 1 milliGRAM(s) Oral two times a day  bisacodyl 5 milliGRAM(s) Oral at bedtime  chlorhexidine 2% Cloths 1 Application(s) Topical daily  cloZAPine 175 milliGRAM(s) Oral at bedtime  FLUoxetine 20 milliGRAM(s) Oral at bedtime  mupirocin 2% Nasal 1 Application(s) Both Nostrils two times a day  polyethylene glycol 3350 17 Gram(s) Oral daily  risperiDONE   Tablet 3.5 milliGRAM(s) Oral two times a day  senna 1 Tablet(s) Oral two times a day  tamsulosin 0.4 milliGRAM(s) Oral at bedtime    MEDICATIONS  (PRN):  acetaminophen     Tablet .. 650 milliGRAM(s) Oral every 6 hours PRN Temp greater or equal to 38C (100.4F), Mild Pain (1 - 3)  aluminum hydroxide/magnesium hydroxide/simethicone Suspension 30 milliLiter(s) Oral every 4 hours PRN Dyspepsia  bisacodyl 5 milliGRAM(s) Oral every 12 hours PRN Constipation  clonazePAM  Tablet 1 milliGRAM(s) Oral two times a day PRN for agitation  melatonin 3 milliGRAM(s) Oral at bedtime PRN Insomnia  ondansetron Injectable 4 milliGRAM(s) IV Push every 8 hours PRN Nausea and/or Vomiting      ALLERGIES  Allergies    No Known Allergies    Intolerances        PHYSICAL EXAM   Vital Signs Last 24 Hrs  T(C): 36.9 (11 Mar 2025 04:45), Max: 36.9 (11 Mar 2025 04:45)  T(F): 98.5 (11 Mar 2025 04:45), Max: 98.5 (11 Mar 2025 04:45)  HR: 56 (11 Mar 2025 04:45) (56 - 79)  BP: 110/68 (11 Mar 2025 04:45) (105/64 - 120/82)  BP(mean): 80 (10 Mar 2025 12:00) (80 - 95)  RR: 18 (11 Mar 2025 04:45) (17 - 19)  SpO2: 96% (11 Mar 2025 04:45) (95% - 100%)    Parameters below as of 11 Mar 2025 04:45  Patient On (Oxygen Delivery Method): room air        T(C): 36.9 (03-11-25 @ 04:45), Max: 36.9 (03-11-25 @ 04:45)  HR: 56 (03-11-25 @ 04:45) (56 - 79)  BP: 110/68 (03-11-25 @ 04:45) (105/64 - 120/82)  RR: 18 (03-11-25 @ 04:45) (17 - 19)  SpO2: 96% (03-11-25 @ 04:45) (95% - 100%)    CONSTITUTIONAL: Well groomed, no apparent distress  EYES: PERRLA and symmetric, EOMI, No conjunctival or scleral injection, non-icteric  ENMT: Oral mucosa with moist membranes. Normal dentition; no pharyngeal injection or exudates             NECK: Supple, symmetric and without tracheal deviation   RESP: No respiratory distress, no use of accessory muscles; CTA b/l, no WRR  CV: RRR, +S1S2, no MRG; no JVD; no peripheral edema  GI: Soft, NT, ND, no rebound, no guarding; no palpable masses; no hepatosplenomegaly; no hernia palpated  LYMPH: No cervical LAD or tenderness; no axillary LAD or tenderness; no inguinal LAD or tenderness  MSK: Normal gait; No digital clubbing or cyanosis; examination of the (head/neck/spine/ribs/pelvis, RUE, LUE, RLE, LLE) without misalignment,            Normal ROM without pain, no spinal tenderness, normal muscle strength/tone  SKIN: No rashes or ulcers noted; no subcutaneous nodules or induration palpable  NEURO: CN II-XII intact; normal reflexes in upper and lower extremities, sensation intact in upper and lower extremities b/l to light touch   PSYCH: Appropriate insight/judgment; A+O x 3, mood and affect appropriate, recent/remote memory intact      LABS                  CAPILLARY BLOOD GLUCOSE                  IMAGING          HPI  Patient is a 39y Male with PMHx Bipolar I d/o, Seizure d/o, intellectual delay, anxiety admitted for AHRF 2/2 AIME.      INTERVAL EVENTS    HOSPITAL COURSE  Pt BIBEMS 3/07 from adult day care for lethargy + headache, VSS in triage, requiring NIV for sleep hypoxia. CXR neg for acute disease. MICU consulted for hypercapnic resp failure with ABG showing CO2 of 73, rec HIFLOW + CTH + EEG. 3/08, pulm consulted, rec BIPAP. Psych consulted, rec re-start of meds. 3/09, MRSA swab positive.     REVIEW OF SYSTEMS Denies the following   General: No fatigue, decreased apatite, weight loss  HEENT: No cough, throat pain, rhinorrhea hemoptysis    Chest: No shortness of breath, chest pain  Abdomen: No abdominal pain, hematemesis   Genitourinary: No dysuria, No hematuria   Extremities: No joint pain, no change in range of motion  Skin: No rashes, ecchymoses purpura, nodules       MEDICATIONS  MEDICATIONS  (STANDING):  ARIPiprazole 20 milliGRAM(s) Oral daily  benztropine 1 milliGRAM(s) Oral two times a day  bisacodyl 5 milliGRAM(s) Oral at bedtime  chlorhexidine 2% Cloths 1 Application(s) Topical daily  cloZAPine 175 milliGRAM(s) Oral at bedtime  FLUoxetine 20 milliGRAM(s) Oral at bedtime  mupirocin 2% Nasal 1 Application(s) Both Nostrils two times a day  polyethylene glycol 3350 17 Gram(s) Oral daily  risperiDONE   Tablet 3.5 milliGRAM(s) Oral two times a day  senna 1 Tablet(s) Oral two times a day  tamsulosin 0.4 milliGRAM(s) Oral at bedtime    MEDICATIONS  (PRN):  acetaminophen     Tablet .. 650 milliGRAM(s) Oral every 6 hours PRN Temp greater or equal to 38C (100.4F), Mild Pain (1 - 3)  aluminum hydroxide/magnesium hydroxide/simethicone Suspension 30 milliLiter(s) Oral every 4 hours PRN Dyspepsia  bisacodyl 5 milliGRAM(s) Oral every 12 hours PRN Constipation  clonazePAM  Tablet 1 milliGRAM(s) Oral two times a day PRN for agitation  melatonin 3 milliGRAM(s) Oral at bedtime PRN Insomnia  ondansetron Injectable 4 milliGRAM(s) IV Push every 8 hours PRN Nausea and/or Vomiting      ALLERGIES  Allergies    No Known Allergies    Intolerances        PHYSICAL EXAM   Vital Signs Last 24 Hrs  T(C): 36.9 (11 Mar 2025 04:45), Max: 36.9 (11 Mar 2025 04:45)  T(F): 98.5 (11 Mar 2025 04:45), Max: 98.5 (11 Mar 2025 04:45)  HR: 56 (11 Mar 2025 04:45) (56 - 79)  BP: 110/68 (11 Mar 2025 04:45) (105/64 - 120/82)  BP(mean): 80 (10 Mar 2025 12:00) (80 - 95)  RR: 18 (11 Mar 2025 04:45) (17 - 19)  SpO2: 96% (11 Mar 2025 04:45) (95% - 100%)    Parameters below as of 11 Mar 2025 04:45  Patient On (Oxygen Delivery Method): room air        T(C): 36.9 (03-11-25 @ 04:45), Max: 36.9 (03-11-25 @ 04:45)  HR: 56 (03-11-25 @ 04:45) (56 - 79)  BP: 110/68 (03-11-25 @ 04:45) (105/64 - 120/82)  RR: 18 (03-11-25 @ 04:45) (17 - 19)  SpO2: 96% (03-11-25 @ 04:45) (95% - 100%)    CONSTITUTIONAL: Well groomed, no apparent distress, sitting in bed comfortably  EYES: symmetric, EOMI, No conjunctival or scleral injection, non-icteric  ENMT: Oral mucosa with moist membranes. Normal dentition  NECK: symmetric and without tracheal deviation   RESP: No respiratory distress, no use of accessory muscles; CTA b/l, no WRR  CV: RRR, +S1S2, no MRG; no JVD; no peripheral edema  GI: Soft, NT, ND, no rebound, no guarding  MSK: Normal gait; No digital clubbing or cyanosis  SKIN: No rashes or ulcers noted  NEURO: sensation intact in upper and lower extremities b/l to light touch       LABS                  CAPILLARY BLOOD GLUCOSE                  IMAGING

## 2025-03-20 ENCOUNTER — NON-APPOINTMENT (OUTPATIENT)
Age: 40
End: 2025-03-20

## 2025-03-22 ENCOUNTER — NON-APPOINTMENT (OUTPATIENT)
Age: 40
End: 2025-03-22

## 2025-03-22 ENCOUNTER — EMERGENCY (EMERGENCY)
Facility: HOSPITAL | Age: 40
LOS: 1 days | Discharge: HOME | End: 2025-03-22
Attending: STUDENT IN AN ORGANIZED HEALTH CARE EDUCATION/TRAINING PROGRAM
Payer: MEDICARE

## 2025-03-22 VITALS
HEIGHT: 71 IN | WEIGHT: 179.9 LBS | TEMPERATURE: 98 F | RESPIRATION RATE: 20 BRPM | DIASTOLIC BLOOD PRESSURE: 61 MMHG | SYSTOLIC BLOOD PRESSURE: 105 MMHG | OXYGEN SATURATION: 98 % | HEART RATE: 74 BPM

## 2025-03-22 PROCEDURE — 70450 CT HEAD/BRAIN W/O DYE: CPT | Mod: 26

## 2025-03-22 PROCEDURE — 72125 CT NECK SPINE W/O DYE: CPT | Mod: 26

## 2025-03-22 PROCEDURE — 99284 EMERGENCY DEPT VISIT MOD MDM: CPT | Mod: 25

## 2025-03-22 PROCEDURE — 72125 CT NECK SPINE W/O DYE: CPT | Mod: MC

## 2025-03-22 PROCEDURE — 99284 EMERGENCY DEPT VISIT MOD MDM: CPT

## 2025-03-22 PROCEDURE — 70450 CT HEAD/BRAIN W/O DYE: CPT | Mod: MC

## 2025-03-22 RX ORDER — OLANZAPINE 10 MG/1
10 TABLET ORAL ONCE
Refills: 0 | Status: COMPLETED | OUTPATIENT
Start: 2025-03-22 | End: 2025-03-22

## 2025-03-22 RX ADMIN — OLANZAPINE 10 MILLIGRAM(S): 10 TABLET ORAL at 13:58

## 2025-03-22 NOTE — ED PROVIDER NOTE - PROGRESS NOTE DETAILS
Carlos ATTPAGE  pt has been entering and exiting room multiple times not aggressive for his falls safety will provide some  medication to help him calm down   pt kong been talking w/ o assistance Carlos ATTG   pt calm, cooperative redirectable , pt left safely from the hospital

## 2025-03-22 NOTE — ED PROVIDER NOTE - PRINCIPAL DIAGNOSIS
Head injury O-T Plasty Text: The defect edges were debeveled with a #15 scalpel blade.  Given the location of the defect, shape of the defect and the proximity to free margins an O-T plasty was deemed most appropriate.  Using a sterile surgical marker, an appropriate O-T plasty was drawn incorporating the defect and placing the expected incisions within the relaxed skin tension lines where possible.    The area thus outlined was incised deep to adipose tissue with a #15 scalpel blade.  The skin margins were undermined to an appropriate distance in all directions utilizing iris scissors.

## 2025-03-22 NOTE — ED PROVIDER NOTE - CLINICAL SUMMARY MEDICAL DECISION MAKING FREE TEXT BOX
Carlos ATTG 39 M Bipolar I d/o, schizophrenia,  Seizure d/o, intellectual delay, anxiety cc sp head injury 3-4 days ago, w/ nose bleed today, unclear circumstances, pt noted to have had fall days ago, presented to UC today for nose bleed, no active bleeding UC told to go to ED. Unclear surroundings regarding fall, pt does wear helmet / present w. him at time of interview, Pt information gathered from aide at bedside     GENERAL: Awake, alert,   HEENT:  Minor scrape / abrasion to front of head, no active bleeding from nare, drive blood in left nare  nose appears midline   LUNGS:  non labored breathing   ABDOMEN:  non distended   BACK: No midline spinal tenderness, no CVA tenderness  EXT:  no deformities.  NEURO:  able to stand, hold head up, move arms and elgs   SKIN: Warm and dry. No rash.  PSYCH: Normal affect.

## 2025-03-22 NOTE — ED PROVIDER NOTE - PATIENT PORTAL LINK FT
You can access the FollowMyHealth Patient Portal offered by [facility] by registering at the following website: [email or web address]. By joining [employer]’s FollowMyHealth portal, you will also be able to view your health information using other applications (apps) compatible with our system.

## 2025-03-22 NOTE — ED PROVIDER NOTE - NSFOLLOWUPINSTRUCTIONS_ED_ALL_ED_FT
Abdomen soft, nontender in RUQ/RLQ/LUQ/LLQ, nondistended , no guarding or rigidity , no masses palpable , unremarkable bowel sounds , no hepatosplenomegaly
Fall Prevention    WHAT YOU NEED TO KNOW:    Fall prevention includes ways to make your home and other areas safer. It also includes ways you can move more carefully to prevent a fall. Health conditions that cause changes in your blood pressure, vision, or muscle strength and coordination may increase your risk for falls. Medicines may also increase your risk for falls if they make you dizzy, weak, or sleepy.     DISCHARGE INSTRUCTIONS:    Call 911 or have someone else call if:     You have fallen and are unconscious.      You have fallen and cannot move part of your body.    Contact your healthcare provider if:     You have fallen and have pain or a headache.      You have questions or concerns about your condition or care.    Fall prevention tips:     Stand or sit up slowly. This may help you keep your balance and prevent falls.      Use assistive devices as directed. Your healthcare provider may suggest that you use a cane or walker to help you keep your balance. You may need to have grab bars put in your bathroom near the toilet or in the shower.      Wear shoes that fit well and have soles that . Wear shoes both inside and outside. Use slippers with good . Do not wear shoes with high heels.      Wear a personal alarm. This is a device that allows you to call 911 if you fall and need help. Ask your healthcare provider for more information.      Stay active. Exercise can help strengthen your muscles and improve your balance. Your healthcare provider may recommend water aerobics or walking. He or she may also recommend physical therapy to improve your coordination. Never start an exercise program without talking to your healthcare provider first.       Manage your medical conditions. Keep all appointments with your healthcare providers. Visit your eye doctor as directed.    Home safety tips:     Add items to prevent falls in the bathroom. Put nonslip strips on your bath or shower floor to prevent you from slipping. Use a bath mat if you do not have carpet in the bathroom. This will prevent you from falling when you step out of the bath or shower. Use a shower seat so you do not need to stand while you shower. Sit on the toilet or a chair in your bathroom to dry yourself and put on clothing. This will prevent you from losing your balance from drying or dressing yourself while you are standing.       Keep paths clear. Remove books, shoes, and other objects from walkways and stairs. Place cords for telephones and lamps out of the way so that you do not need to walk over them. Tape them down if you cannot move them. Remove small rugs. If you cannot remove a rug, secure it with double-sided tape. This will prevent you from tripping.       Install bright lights in your home. Use night lights to help light paths to the bathroom or kitchen. Always turn on the light before you start walking.      Keep items you use often on shelves within reach. Do not use a step stool to help you reach an item.      Paint or place reflective tape on the edges of your stairs. This will help you see the stairs better.    Follow up with your healthcare provider as directed: Write down your questions so you remember to ask them during your visits.

## 2025-03-22 NOTE — ED ADULT TRIAGE NOTE - CHIEF COMPLAINT QUOTE
As  per HHA sent from  for  an epiosde of nose bleed today , no active bleeding at his time. Also pt trip and fell few days ag striking his head. Had helmet on . As per aid no LOC at that time- Hx of MR

## 2025-03-22 NOTE — ED ADULT NURSE NOTE - OBJECTIVE STATEMENT
Assumed care of pt at 1211. Pt is awake w/aide c/o a nosebleed today after a fall a few days ago, pt had helmet on at time of fall, denies N/V/D/CP/SOB/LOC, small cut to forehead, no bleeding present, pt resting comfortably showing no signs of respiratory distress or pain, pt is calm and cooperative, history of MR/seizure disorder

## 2025-03-22 NOTE — ED ADULT NURSE NOTE - NSFALLRISKINTERV_ED_ALL_ED

## 2025-03-22 NOTE — ED ADULT NURSE NOTE - BREATHING, MLM
Exercise: Recommend regular exercise. Aerobic exercise consisting of 15 minutes of high intensity exercise or 30 minutes of moderate intensity exercise >5 days of the week.    Recommend DASH diet which includes general principles below:  -Eating vegetables, fruits, and whole grains  -Eating fat-free or low-fat dairy products, fish, poultry, beans, nuts, and vegetable oils  -Limiting foods that are high in saturated fat, such as fatty meats, full-fat dairy products, and tropical oils such as coconut, palm kernel, and palm oils  -Limiting sugar-sweetened beverages and sweets.  -Consume foods rich in potassium, calcium, magnesium, fiber, and protein  -Limit foods with sodium    This diet can potentially reduce the risk of cardiovascular conditions, e.g. Hypertension, heart attack, and stroke.    Sleep: 7-9 hours of sleep recommended each night. It is impossible to make up lost sleep's impact on your health so obtaining the appropriate amount is critical!    Substance Abuse: Recommend cessation of tobacco and limited/moderate use of alcohol, if at all. Avoid driving under the influence. Options are available for the treatment of substance abuse.    Injury prevention: Recommend seatbelts and bicycle/motorcycle helmets.    Dental health: Recommend regular brushing of teeth, flossing, and dental visits.    Recommend the following immunizations: annual flu shot, Tdap booster every 10 years, pneumonia vaccine >65 years old, Shingrix 2 doses >50 years old    Spontaneous, unlabored and symmetrical

## 2025-03-31 ENCOUNTER — NON-APPOINTMENT (OUTPATIENT)
Age: 40
End: 2025-03-31

## 2025-03-31 ENCOUNTER — EMERGENCY (EMERGENCY)
Facility: HOSPITAL | Age: 40
LOS: 1 days | Discharge: HOME | End: 2025-03-31
Attending: EMERGENCY MEDICINE
Payer: MEDICARE

## 2025-03-31 VITALS
TEMPERATURE: 98 F | HEART RATE: 77 BPM | SYSTOLIC BLOOD PRESSURE: 119 MMHG | HEIGHT: 71 IN | OXYGEN SATURATION: 97 % | RESPIRATION RATE: 20 BRPM | DIASTOLIC BLOOD PRESSURE: 83 MMHG | WEIGHT: 177.47 LBS

## 2025-03-31 VITALS
HEART RATE: 65 BPM | RESPIRATION RATE: 20 BRPM | SYSTOLIC BLOOD PRESSURE: 114 MMHG | OXYGEN SATURATION: 99 % | DIASTOLIC BLOOD PRESSURE: 77 MMHG

## 2025-03-31 PROCEDURE — 96372 THER/PROPH/DIAG INJ SC/IM: CPT

## 2025-03-31 PROCEDURE — 99284 EMERGENCY DEPT VISIT MOD MDM: CPT | Mod: FS

## 2025-03-31 PROCEDURE — 99284 EMERGENCY DEPT VISIT MOD MDM: CPT | Mod: 25

## 2025-03-31 RX ORDER — MIDAZOLAM IN 0.9 % SOD.CHLORID 1 MG/ML
2 PLASTIC BAG, INJECTION (ML) INTRAVENOUS ONCE
Refills: 0 | Status: DISCONTINUED | OUTPATIENT
Start: 2025-03-31 | End: 2025-03-31

## 2025-03-31 RX ORDER — MIDAZOLAM IN 0.9 % SOD.CHLORID 1 MG/ML
2.5 PLASTIC BAG, INJECTION (ML) INTRAVENOUS ONCE
Refills: 0 | Status: DISCONTINUED | OUTPATIENT
Start: 2025-03-31 | End: 2025-03-31

## 2025-03-31 RX ORDER — AMOXICILLIN AND CLAVULANATE POTASSIUM 500; 125 MG/1; MG/1
10 TABLET, FILM COATED ORAL
Qty: 2 | Refills: 0
Start: 2025-03-31 | End: 2025-04-06

## 2025-03-31 RX ORDER — MIDAZOLAM IN 0.9 % SOD.CHLORID 1 MG/ML
10 PLASTIC BAG, INJECTION (ML) INTRAVENOUS ONCE
Refills: 0 | Status: DISCONTINUED | OUTPATIENT
Start: 2025-03-31 | End: 2025-03-31

## 2025-03-31 RX ORDER — MIDAZOLAM IN 0.9 % SOD.CHLORID 1 MG/ML
5 PLASTIC BAG, INJECTION (ML) INTRAVENOUS ONCE
Refills: 0 | Status: DISCONTINUED | OUTPATIENT
Start: 2025-03-31 | End: 2025-03-31

## 2025-03-31 RX ORDER — KETAMINE HCL IN 0.9 % NACL 50 MG/5 ML
100 SYRINGE (ML) INTRAVENOUS ONCE
Refills: 0 | Status: DISCONTINUED | OUTPATIENT
Start: 2025-03-31 | End: 2025-03-31

## 2025-03-31 RX ADMIN — Medication 10 MILLIGRAM(S): at 12:09

## 2025-03-31 RX ADMIN — Medication 2 MILLIGRAM(S): at 13:07

## 2025-03-31 RX ADMIN — Medication 5 MILLIGRAM(S): at 13:34

## 2025-03-31 NOTE — ED PROVIDER NOTE - OBJECTIVE STATEMENT
39 y/o male with pmhx MR, autism, bipolar disorder, and seizure disorder presents to the ED for laceration to his lip s/p fall at group home. Recent admission for hypoxic failure with DC March 11th, 2025. Aide at bedside- Aide is not sure exactly of what happened because it occurred overnight. He ay have fallen . He states he is acting his usual behavior. No vomiting. As per chart > tetanus shot in 2022 in ED 41 y/o male with pmhx MR, autism, bipolar disorder, and seizure disorder presents to the ED for laceration to his lip s/p fall at group home. Recent admission for hypoxic failure with DC March 11th, 2025. Aide at bedside- Aide is not sure exactly of what happened because it occurred overnight. He may have fallen- they aren't sure. Aide states he is acting his usual behavior. No vomiting, no coughing, no SOB/CP. As per chart > tetanus shot in 2022 in ED. Ambulatory in the ED

## 2025-03-31 NOTE — ED ADULT NURSE NOTE - OBJECTIVE STATEMENT
Pt at baseline mental status presenting to the ED with group home aide for lip laceration from urgent care. Pt unable to provide PMH due to baseline mental status. Pt unable to provide collateral as to how lip laceration occurred due to baseline mental status. Pts group home aide also does not know how laceration occured. Pt repetitively shouts "no I don't want to show you, Im scared, Im scared!". MD Green and PILI Gonzalez at bedside to assess laceration. Pt respirations are even and nonlabored, pt is NAD. Bed locked and in lowest position  with safety maintained.

## 2025-03-31 NOTE — ED PROVIDER NOTE - ATTENDING APP SHARED VISIT CONTRIBUTION OF CARE
Norma: I performed a face to face bedside interview with patient regarding history of present illness, review of symptoms and past medical history. I completed an independent physical exam.  I have discussed patient's plan of care with advanced care provider.   I agree with note as stated above including HISTORY OF PRESENT ILLNESS, HIV, PAST MEDICAL/SURGICAL/FAMILY/SOCIAL HISTORY, ALLERGIES AND HOME MEDICATIONS, REVIEW OF SYSTEMS, PHYSICAL EXAM, MEDICAL DECISION MAKING and any PROGRESS NOTES during the time I functioned as the attending physician for this patient  unless otherwise noted. My brief assessment is as follows: hx MR present with lip laceration from fall overnight/this morning. acting baseline. no vomiting. with abrasion to lower lip, open laceration to upper lip not through vermillion. not through and through. no other facial trauma. upper lip wound needs closure, pt not tolerating any wound care/intervention even with verbal attempts, required medication for wound care evaluation and treatment.

## 2025-03-31 NOTE — ED PROVIDER NOTE - PROGRESS NOTE DETAILS
Norma: pt received medication for evaluation of wound and debridement of small dead skin/clot. upon assessment of wound with pt sedated, more macerated and not clean edges to aproximate. dead skin/clot cut off, hemostatic, wound care, will give abx with wound care instructions. Norma: pt received medication for evaluation of wound and debridement of small dead skin/clot. upon assessment of wound after pt received anxiolytics, wound found to be more macerated and not clean edges to aproximate. dead skin/clot cut off, hemostatic, wound care, will give abx with wound care instructions. Norma: pt received medication for evaluation of wound and debridement of small dead skin/clot. upon assessment of wound after pt received anxiolytics, wound found to be more macerated and not clean edges to approximate. dead skin/clot cut off, hemostatic, wound care, will give abx with wound care instructions.

## 2025-03-31 NOTE — ED ADULT TRIAGE NOTE - CHIEF COMPLAINT QUOTE
pt arrives to ED w/aide c/o laceration to lip after a suspected fall at group home, denies N/V/D/CP/SOB, history of MR

## 2025-03-31 NOTE — ED PROVIDER NOTE - PATIENT PORTAL LINK FT
You can access the FollowMyHealth Patient Portal offered by [facility] by registering at the following website: [email or web address]. By joining [employer]’s FollowMyHealth portal, you will also be able to view your health information using other applications (apps) compatible with our system.

## 2025-03-31 NOTE — ED PROVIDER NOTE - NSFOLLOWUPINSTRUCTIONS_ED_ALL_ED_FT

## 2025-04-10 ENCOUNTER — NON-APPOINTMENT (OUTPATIENT)
Age: 40
End: 2025-04-10

## 2025-04-14 ENCOUNTER — EMERGENCY (EMERGENCY)
Facility: HOSPITAL | Age: 40
LOS: 1 days | End: 2025-04-14
Attending: EMERGENCY MEDICINE
Payer: MEDICARE

## 2025-04-14 VITALS — HEIGHT: 71 IN | WEIGHT: 169.98 LBS

## 2025-04-14 PROCEDURE — 70480 CT ORBIT/EAR/FOSSA W/O DYE: CPT | Mod: 26,XU

## 2025-04-14 PROCEDURE — 99284 EMERGENCY DEPT VISIT MOD MDM: CPT | Mod: FS

## 2025-04-14 PROCEDURE — 70450 CT HEAD/BRAIN W/O DYE: CPT | Mod: MC

## 2025-04-14 PROCEDURE — 99284 EMERGENCY DEPT VISIT MOD MDM: CPT | Mod: 25

## 2025-04-14 PROCEDURE — 70450 CT HEAD/BRAIN W/O DYE: CPT | Mod: 26

## 2025-04-14 PROCEDURE — 70480 CT ORBIT/EAR/FOSSA W/O DYE: CPT | Mod: MC

## 2025-04-14 NOTE — ED PROVIDER NOTE - PATIENT PORTAL LINK FT
You can access the FollowMyHealth Patient Portal offered by [facility] by registering at the following website: [email or web address]. By joining [employer]’s FollowMyHealth portal, you will also be able to view your health information using other applications (apps) compatible with our system.

## 2025-04-14 NOTE — ED PROVIDER NOTE - ATTENDING APP SHARED VISIT CONTRIBUTION OF CARE
indep eval  pt with hx unwitnessed fall  + MR hx , min verbal  unknown details of fall but staff w pt states it was yesterday  on pe has ecchymosis above eyebrow  no other signs of trauma  plan is imaging and if no acute findings , d/c  agree w eval and mngt plan

## 2025-04-14 NOTE — ED ADULT NURSE NOTE - CHIEF COMPLAINT QUOTE
staff member states he fell onto the bed yesterday hurting right eye  Awake alert, resp wnl, wearing protection helmet, right eye, + bruising noted, pt refusing VS

## 2025-04-14 NOTE — ED PROVIDER NOTE - OBJECTIVE STATEMENT
40 y/o M c/o bruising over right eye which started yesterday after unwitnessed fall in living room at group home.  Denies LOC.  patient unable to give further history.

## 2025-04-14 NOTE — ED ADULT NURSE NOTE - OBJECTIVE STATEMENT
bib group home aide c/o "falling and hitting head yesterday"; pt baseline w/ cognitive delays, minimally to non verbal @ baseline. uses safety helmet .  + bruising to right side of eye/face . staff unsure of LOC, pt poor historian.aide deny anticoag use.  pt refusing to have vitals done.

## 2025-05-10 ENCOUNTER — INPATIENT (INPATIENT)
Facility: HOSPITAL | Age: 40
LOS: 6 days | DRG: 298 | End: 2025-05-17
Attending: STUDENT IN AN ORGANIZED HEALTH CARE EDUCATION/TRAINING PROGRAM | Admitting: STUDENT IN AN ORGANIZED HEALTH CARE EDUCATION/TRAINING PROGRAM
Payer: MEDICARE

## 2025-05-10 VITALS — OXYGEN SATURATION: 99 % | HEART RATE: 72 BPM

## 2025-05-10 DIAGNOSIS — I46.9 CARDIAC ARREST, CAUSE UNSPECIFIED: ICD-10-CM

## 2025-05-10 LAB
A1C WITH ESTIMATED AVERAGE GLUCOSE RESULT: 5.5 % — SIGNIFICANT CHANGE UP (ref 4–5.6)
ACETONE SERPL-MCNC: NEGATIVE — SIGNIFICANT CHANGE UP
ALBUMIN SERPL ELPH-MCNC: 3.5 G/DL — SIGNIFICANT CHANGE UP (ref 3.3–5.2)
ALBUMIN SERPL ELPH-MCNC: 3.6 G/DL — SIGNIFICANT CHANGE UP (ref 3.3–5.2)
ALP SERPL-CCNC: 150 U/L — HIGH (ref 40–120)
ALP SERPL-CCNC: 156 U/L — HIGH (ref 40–120)
ALT FLD-CCNC: 119 U/L — HIGH
ALT FLD-CCNC: 140 U/L — HIGH
AMYLASE P1 CFR SERPL: 36 U/L — SIGNIFICANT CHANGE UP (ref 36–128)
ANION GAP SERPL CALC-SCNC: 10 MMOL/L — SIGNIFICANT CHANGE UP (ref 5–17)
ANION GAP SERPL CALC-SCNC: 20 MMOL/L — HIGH (ref 5–17)
APTT BLD: 27 SEC — SIGNIFICANT CHANGE UP (ref 26.1–36.8)
AST SERPL-CCNC: 138 U/L — HIGH
AST SERPL-CCNC: 275 U/L — HIGH
BASOPHILS # BLD AUTO: 0.05 K/UL — SIGNIFICANT CHANGE UP (ref 0–0.2)
BASOPHILS NFR BLD AUTO: 0.4 % — SIGNIFICANT CHANGE UP (ref 0–2)
BILIRUB SERPL-MCNC: 0.6 MG/DL — SIGNIFICANT CHANGE UP (ref 0.4–2)
BILIRUB SERPL-MCNC: 0.7 MG/DL — SIGNIFICANT CHANGE UP (ref 0.4–2)
BUN SERPL-MCNC: 18.2 MG/DL — SIGNIFICANT CHANGE UP (ref 8–20)
BUN SERPL-MCNC: 18.6 MG/DL — SIGNIFICANT CHANGE UP (ref 8–20)
CALCIUM SERPL-MCNC: 7.9 MG/DL — LOW (ref 8.4–10.5)
CALCIUM SERPL-MCNC: 8.3 MG/DL — LOW (ref 8.4–10.5)
CHLORIDE SERPL-SCNC: 101 MMOL/L — SIGNIFICANT CHANGE UP (ref 96–108)
CHLORIDE SERPL-SCNC: 104 MMOL/L — SIGNIFICANT CHANGE UP (ref 96–108)
CO2 SERPL-SCNC: 18 MMOL/L — LOW (ref 22–29)
CO2 SERPL-SCNC: 24 MMOL/L — SIGNIFICANT CHANGE UP (ref 22–29)
CREAT SERPL-MCNC: 0.76 MG/DL — SIGNIFICANT CHANGE UP (ref 0.5–1.3)
CREAT SERPL-MCNC: 1.06 MG/DL — SIGNIFICANT CHANGE UP (ref 0.5–1.3)
EGFR: 117 ML/MIN/1.73M2 — SIGNIFICANT CHANGE UP
EGFR: 117 ML/MIN/1.73M2 — SIGNIFICANT CHANGE UP
EGFR: 92 ML/MIN/1.73M2 — SIGNIFICANT CHANGE UP
EGFR: 92 ML/MIN/1.73M2 — SIGNIFICANT CHANGE UP
EOSINOPHIL # BLD AUTO: 0.12 K/UL — SIGNIFICANT CHANGE UP (ref 0–0.5)
EOSINOPHIL NFR BLD AUTO: 1 % — SIGNIFICANT CHANGE UP (ref 0–6)
ESTIMATED AVERAGE GLUCOSE: 111 MG/DL — SIGNIFICANT CHANGE UP (ref 68–114)
GAS PNL BLDA: SIGNIFICANT CHANGE UP
GAS PNL BLDA: SIGNIFICANT CHANGE UP
GAS PNL BLDV: SIGNIFICANT CHANGE UP
GAS PNL BLDV: SIGNIFICANT CHANGE UP
GLUCOSE SERPL-MCNC: 156 MG/DL — HIGH (ref 70–99)
GLUCOSE SERPL-MCNC: 244 MG/DL — HIGH (ref 70–99)
HCT VFR BLD CALC: 39.8 % — SIGNIFICANT CHANGE UP (ref 39–50)
HCT VFR BLD CALC: 40.2 % — SIGNIFICANT CHANGE UP (ref 39–50)
HGB BLD-MCNC: 12.7 G/DL — LOW (ref 13–17)
HGB BLD-MCNC: 13.3 G/DL — SIGNIFICANT CHANGE UP (ref 13–17)
IMM GRANULOCYTES # BLD AUTO: 0.36 K/UL — HIGH (ref 0–0.07)
IMM GRANULOCYTES NFR BLD AUTO: 2.9 % — HIGH (ref 0–0.9)
INR BLD: 1.04 RATIO — SIGNIFICANT CHANGE UP (ref 0.85–1.16)
LACTATE SERPL-SCNC: 1.4 MMOL/L — SIGNIFICANT CHANGE UP (ref 0.5–2)
LIDOCAIN IGE QN: 18 U/L — LOW (ref 22–51)
LYMPHOCYTES # BLD AUTO: 2.57 K/UL — SIGNIFICANT CHANGE UP (ref 1–3.3)
LYMPHOCYTES NFR BLD AUTO: 21 % — SIGNIFICANT CHANGE UP (ref 13–44)
MAGNESIUM SERPL-MCNC: 1.6 MG/DL — SIGNIFICANT CHANGE UP (ref 1.6–2.6)
MAGNESIUM SERPL-MCNC: 2 MG/DL — SIGNIFICANT CHANGE UP (ref 1.6–2.6)
MCHC RBC-ENTMCNC: 31.6 PG — SIGNIFICANT CHANGE UP (ref 27–34)
MCHC RBC-ENTMCNC: 31.6 PG — SIGNIFICANT CHANGE UP (ref 27–34)
MCHC RBC-ENTMCNC: 31.9 G/DL — LOW (ref 32–36)
MCHC RBC-ENTMCNC: 33.1 G/DL — SIGNIFICANT CHANGE UP (ref 32–36)
MCV RBC AUTO: 95.5 FL — SIGNIFICANT CHANGE UP (ref 80–100)
MCV RBC AUTO: 99 FL — SIGNIFICANT CHANGE UP (ref 80–100)
MONOCYTES # BLD AUTO: 0.78 K/UL — SIGNIFICANT CHANGE UP (ref 0–0.9)
MONOCYTES NFR BLD AUTO: 6.4 % — SIGNIFICANT CHANGE UP (ref 2–14)
MRSA PCR RESULT.: SIGNIFICANT CHANGE UP
NEUTROPHILS # BLD AUTO: 8.33 K/UL — HIGH (ref 1.8–7.4)
NEUTROPHILS NFR BLD AUTO: 68.3 % — SIGNIFICANT CHANGE UP (ref 43–77)
NRBC # BLD AUTO: 0 K/UL — SIGNIFICANT CHANGE UP (ref 0–0)
NRBC # BLD AUTO: 0 K/UL — SIGNIFICANT CHANGE UP (ref 0–0)
NRBC # FLD: 0 K/UL — SIGNIFICANT CHANGE UP (ref 0–0)
NRBC # FLD: 0 K/UL — SIGNIFICANT CHANGE UP (ref 0–0)
NRBC BLD AUTO-RTO: 0 /100 WBCS — SIGNIFICANT CHANGE UP (ref 0–0)
NRBC BLD AUTO-RTO: 0 /100 WBCS — SIGNIFICANT CHANGE UP (ref 0–0)
NT-PROBNP SERPL-SCNC: 52 PG/ML — SIGNIFICANT CHANGE UP (ref 0–300)
OSMOLALITY SERPL: 302 MOSMOL/KG — HIGH (ref 275–300)
OSMOLALITY UR: 684 MOSM/KG — SIGNIFICANT CHANGE UP (ref 300–1000)
PHOSPHATE SERPL-MCNC: 1.5 MG/DL — LOW (ref 2.4–4.7)
PHOSPHATE SERPL-MCNC: 6.9 MG/DL — HIGH (ref 2.4–4.7)
PLATELET # BLD AUTO: 247 K/UL — SIGNIFICANT CHANGE UP (ref 150–400)
PLATELET # BLD AUTO: 263 K/UL — SIGNIFICANT CHANGE UP (ref 150–400)
PMV BLD: 9.3 FL — SIGNIFICANT CHANGE UP (ref 7–13)
PMV BLD: 9.3 FL — SIGNIFICANT CHANGE UP (ref 7–13)
POTASSIUM SERPL-MCNC: 4 MMOL/L — SIGNIFICANT CHANGE UP (ref 3.5–5.3)
POTASSIUM SERPL-MCNC: 4.6 MMOL/L — SIGNIFICANT CHANGE UP (ref 3.5–5.3)
POTASSIUM SERPL-SCNC: 4 MMOL/L — SIGNIFICANT CHANGE UP (ref 3.5–5.3)
POTASSIUM SERPL-SCNC: 4.6 MMOL/L — SIGNIFICANT CHANGE UP (ref 3.5–5.3)
PROT SERPL-MCNC: 5.9 G/DL — LOW (ref 6.6–8.7)
PROT SERPL-MCNC: 6 G/DL — LOW (ref 6.6–8.7)
PROTHROM AB SERPL-ACNC: 11.8 SEC — SIGNIFICANT CHANGE UP (ref 9.9–13.4)
RBC # BLD: 4.02 M/UL — LOW (ref 4.2–5.8)
RBC # BLD: 4.21 M/UL — SIGNIFICANT CHANGE UP (ref 4.2–5.8)
RBC # FLD: 13.8 % — SIGNIFICANT CHANGE UP (ref 10.3–14.5)
RBC # FLD: 13.8 % — SIGNIFICANT CHANGE UP (ref 10.3–14.5)
S AUREUS DNA NOSE QL NAA+PROBE: DETECTED
SODIUM SERPL-SCNC: 138 MMOL/L — SIGNIFICANT CHANGE UP (ref 135–145)
SODIUM SERPL-SCNC: 139 MMOL/L — SIGNIFICANT CHANGE UP (ref 135–145)
TROPONIN T, HIGH SENSITIVITY RESULT: 17 NG/L — SIGNIFICANT CHANGE UP (ref 0–51)
WBC # BLD: 12.21 K/UL — HIGH (ref 3.8–10.5)
WBC # BLD: 8.83 K/UL — SIGNIFICANT CHANGE UP (ref 3.8–10.5)
WBC # FLD AUTO: 12.21 K/UL — HIGH (ref 3.8–10.5)
WBC # FLD AUTO: 8.83 K/UL — SIGNIFICANT CHANGE UP (ref 3.8–10.5)

## 2025-05-10 PROCEDURE — 99291 CRITICAL CARE FIRST HOUR: CPT | Mod: 25

## 2025-05-10 PROCEDURE — 70450 CT HEAD/BRAIN W/O DYE: CPT | Mod: 26

## 2025-05-10 PROCEDURE — 99291 CRITICAL CARE FIRST HOUR: CPT

## 2025-05-10 PROCEDURE — 71260 CT THORAX DX C+: CPT | Mod: 26

## 2025-05-10 PROCEDURE — 92950 HEART/LUNG RESUSCITATION CPR: CPT

## 2025-05-10 PROCEDURE — 72125 CT NECK SPINE W/O DYE: CPT | Mod: 26

## 2025-05-10 PROCEDURE — 71045 X-RAY EXAM CHEST 1 VIEW: CPT | Mod: 26,77

## 2025-05-10 PROCEDURE — 95718 EEG PHYS/QHP 2-12 HR W/VEEG: CPT

## 2025-05-10 PROCEDURE — 71045 X-RAY EXAM CHEST 1 VIEW: CPT | Mod: 26

## 2025-05-10 PROCEDURE — 74177 CT ABD & PELVIS W/CONTRAST: CPT | Mod: 26

## 2025-05-10 RX ORDER — NOREPINEPHRINE BITARTRATE 8 MG
0.05 SOLUTION INTRAVENOUS
Qty: 8 | Refills: 0 | Status: DISCONTINUED | OUTPATIENT
Start: 2025-05-10 | End: 2025-05-11

## 2025-05-10 RX ORDER — PIPERACILLIN-TAZO-DEXTROSE,ISO 3.375G/5
3.38 IV SOLUTION, PIGGYBACK PREMIX FROZEN(ML) INTRAVENOUS ONCE
Refills: 0 | Status: COMPLETED | OUTPATIENT
Start: 2025-05-10 | End: 2025-05-10

## 2025-05-10 RX ORDER — PROPOFOL 10 MG/ML
50 INJECTION, EMULSION INTRAVENOUS
Qty: 1000 | Refills: 0 | Status: DISCONTINUED | OUTPATIENT
Start: 2025-05-10 | End: 2025-05-10

## 2025-05-10 RX ORDER — SODIUM CHLORIDE 9 G/1000ML
1000 INJECTION, SOLUTION INTRAVENOUS ONCE
Refills: 0 | Status: DISCONTINUED | OUTPATIENT
Start: 2025-05-10 | End: 2025-05-17

## 2025-05-10 RX ORDER — PROPOFOL 10 MG/ML
30 INJECTION, EMULSION INTRAVENOUS
Qty: 1000 | Refills: 0 | Status: DISCONTINUED | OUTPATIENT
Start: 2025-05-10 | End: 2025-05-11

## 2025-05-10 RX ORDER — PROPOFOL 10 MG/ML
50 INJECTION, EMULSION INTRAVENOUS ONCE
Refills: 0 | Status: DISCONTINUED | OUTPATIENT
Start: 2025-05-10 | End: 2025-05-11

## 2025-05-10 RX ORDER — POLYETHYLENE GLYCOL 3350 17 G/17G
17 POWDER, FOR SOLUTION ORAL EVERY 12 HOURS
Refills: 0 | Status: DISCONTINUED | OUTPATIENT
Start: 2025-05-10 | End: 2025-05-17

## 2025-05-10 RX ORDER — ENOXAPARIN SODIUM 100 MG/ML
40 INJECTION SUBCUTANEOUS EVERY 24 HOURS
Refills: 0 | Status: DISCONTINUED | OUTPATIENT
Start: 2025-05-10 | End: 2025-05-17

## 2025-05-10 RX ORDER — BISACODYL 5 MG
5 TABLET, DELAYED RELEASE (ENTERIC COATED) ORAL EVERY 12 HOURS
Refills: 0 | Status: DISCONTINUED | OUTPATIENT
Start: 2025-05-10 | End: 2025-05-17

## 2025-05-10 RX ORDER — PIPERACILLIN-TAZO-DEXTROSE,ISO 3.375G/5
3.38 IV SOLUTION, PIGGYBACK PREMIX FROZEN(ML) INTRAVENOUS EVERY 8 HOURS
Refills: 0 | Status: DISCONTINUED | OUTPATIENT
Start: 2025-05-11 | End: 2025-05-17

## 2025-05-10 RX ORDER — SENNA 187 MG
2 TABLET ORAL AT BEDTIME
Refills: 0 | Status: DISCONTINUED | OUTPATIENT
Start: 2025-05-10 | End: 2025-05-17

## 2025-05-10 RX ADMIN — PROPOFOL 14.7 MICROGRAM(S)/KG/MIN: 10 INJECTION, EMULSION INTRAVENOUS at 22:12

## 2025-05-10 RX ADMIN — NOREPINEPHRINE BITARTRATE 6.56 MICROGRAM(S)/KG/MIN: 8 SOLUTION at 17:38

## 2025-05-10 RX ADMIN — Medication 1000 MILLILITER(S): at 18:30

## 2025-05-10 RX ADMIN — PROPOFOL 21 MICROGRAM(S)/KG/MIN: 10 INJECTION, EMULSION INTRAVENOUS at 17:38

## 2025-05-10 RX ADMIN — Medication 1 APPLICATION(S): at 22:13

## 2025-05-10 RX ADMIN — Medication 200 GRAM(S): at 17:39

## 2025-05-10 RX ADMIN — Medication 1000 MILLILITER(S): at 17:39

## 2025-05-11 LAB
ALBUMIN SERPL ELPH-MCNC: 3.7 G/DL — SIGNIFICANT CHANGE UP (ref 3.3–5.2)
ALBUMIN SERPL ELPH-MCNC: 3.7 G/DL — SIGNIFICANT CHANGE UP (ref 3.3–5.2)
ALP SERPL-CCNC: 130 U/L — HIGH (ref 40–120)
ALP SERPL-CCNC: 139 U/L — HIGH (ref 40–120)
ALT FLD-CCNC: 134 U/L — HIGH
ALT FLD-CCNC: 141 U/L — HIGH
ANION GAP SERPL CALC-SCNC: 13 MMOL/L — SIGNIFICANT CHANGE UP (ref 5–17)
ANION GAP SERPL CALC-SCNC: 15 MMOL/L — SIGNIFICANT CHANGE UP (ref 5–17)
APPEARANCE UR: CLEAR — SIGNIFICANT CHANGE UP
AST SERPL-CCNC: 250 U/L — HIGH
AST SERPL-CCNC: 274 U/L — HIGH
B-OH-BUTYR SERPL-SCNC: 0.3 MMOL/L — SIGNIFICANT CHANGE UP
BACTERIA # UR AUTO: NEGATIVE /HPF — SIGNIFICANT CHANGE UP
BASOPHILS # BLD AUTO: 0.02 K/UL — SIGNIFICANT CHANGE UP (ref 0–0.2)
BASOPHILS NFR BLD AUTO: 0.1 % — SIGNIFICANT CHANGE UP (ref 0–2)
BILIRUB SERPL-MCNC: 1 MG/DL — SIGNIFICANT CHANGE UP (ref 0.4–2)
BILIRUB SERPL-MCNC: 1.3 MG/DL — SIGNIFICANT CHANGE UP (ref 0.4–2)
BILIRUB UR-MCNC: NEGATIVE — SIGNIFICANT CHANGE UP
BUN SERPL-MCNC: 20.2 MG/DL — HIGH (ref 8–20)
BUN SERPL-MCNC: 22.2 MG/DL — HIGH (ref 8–20)
CALCIUM SERPL-MCNC: 8.3 MG/DL — LOW (ref 8.4–10.5)
CALCIUM SERPL-MCNC: 8.5 MG/DL — SIGNIFICANT CHANGE UP (ref 8.4–10.5)
CHLORIDE SERPL-SCNC: 101 MMOL/L — SIGNIFICANT CHANGE UP (ref 96–108)
CHLORIDE SERPL-SCNC: 102 MMOL/L — SIGNIFICANT CHANGE UP (ref 96–108)
CO2 SERPL-SCNC: 21 MMOL/L — LOW (ref 22–29)
CO2 SERPL-SCNC: 22 MMOL/L — SIGNIFICANT CHANGE UP (ref 22–29)
COLOR SPEC: YELLOW — SIGNIFICANT CHANGE UP
CREAT SERPL-MCNC: 0.64 MG/DL — SIGNIFICANT CHANGE UP (ref 0.5–1.3)
CREAT SERPL-MCNC: 0.86 MG/DL — SIGNIFICANT CHANGE UP (ref 0.5–1.3)
DIFF PNL FLD: NEGATIVE — SIGNIFICANT CHANGE UP
EGFR: 113 ML/MIN/1.73M2 — SIGNIFICANT CHANGE UP
EGFR: 113 ML/MIN/1.73M2 — SIGNIFICANT CHANGE UP
EGFR: 124 ML/MIN/1.73M2 — SIGNIFICANT CHANGE UP
EGFR: 124 ML/MIN/1.73M2 — SIGNIFICANT CHANGE UP
EOSINOPHIL # BLD AUTO: 0 K/UL — SIGNIFICANT CHANGE UP (ref 0–0.5)
EOSINOPHIL NFR BLD AUTO: 0 % — SIGNIFICANT CHANGE UP (ref 0–6)
GLUCOSE SERPL-MCNC: 153 MG/DL — HIGH (ref 70–99)
GLUCOSE SERPL-MCNC: 171 MG/DL — HIGH (ref 70–99)
GLUCOSE UR QL: NEGATIVE MG/DL — SIGNIFICANT CHANGE UP
GRAM STN FLD: ABNORMAL
HCT VFR BLD CALC: 41.7 % — SIGNIFICANT CHANGE UP (ref 39–50)
HCT VFR BLD CALC: 43.5 % — SIGNIFICANT CHANGE UP (ref 39–50)
HGB BLD-MCNC: 14 G/DL — SIGNIFICANT CHANGE UP (ref 13–17)
HGB BLD-MCNC: 14.4 G/DL — SIGNIFICANT CHANGE UP (ref 13–17)
IMM GRANULOCYTES # BLD AUTO: 0.05 K/UL — SIGNIFICANT CHANGE UP (ref 0–0.07)
IMM GRANULOCYTES NFR BLD AUTO: 0.3 % — SIGNIFICANT CHANGE UP (ref 0–0.9)
KETONES UR-MCNC: ABNORMAL MG/DL
LEUKOCYTE ESTERASE UR-ACNC: NEGATIVE — SIGNIFICANT CHANGE UP
LYMPHOCYTES # BLD AUTO: 0.52 K/UL — LOW (ref 1–3.3)
LYMPHOCYTES NFR BLD AUTO: 3.4 % — LOW (ref 13–44)
MAGNESIUM SERPL-MCNC: 1.4 MG/DL — LOW (ref 1.6–2.6)
MAGNESIUM SERPL-MCNC: 2.1 MG/DL — SIGNIFICANT CHANGE UP (ref 1.6–2.6)
MCHC RBC-ENTMCNC: 31.2 PG — SIGNIFICANT CHANGE UP (ref 27–34)
MCHC RBC-ENTMCNC: 31.4 PG — SIGNIFICANT CHANGE UP (ref 27–34)
MCHC RBC-ENTMCNC: 33.1 G/DL — SIGNIFICANT CHANGE UP (ref 32–36)
MCHC RBC-ENTMCNC: 33.6 G/DL — SIGNIFICANT CHANGE UP (ref 32–36)
MCV RBC AUTO: 93.5 FL — SIGNIFICANT CHANGE UP (ref 80–100)
MCV RBC AUTO: 94.4 FL — SIGNIFICANT CHANGE UP (ref 80–100)
MONOCYTES # BLD AUTO: 0.56 K/UL — SIGNIFICANT CHANGE UP (ref 0–0.9)
MONOCYTES NFR BLD AUTO: 3.7 % — SIGNIFICANT CHANGE UP (ref 2–14)
MRSA PCR RESULT.: SIGNIFICANT CHANGE UP
NEUTROPHILS # BLD AUTO: 14.03 K/UL — HIGH (ref 1.8–7.4)
NEUTROPHILS NFR BLD AUTO: 92.5 % — HIGH (ref 43–77)
NITRITE UR-MCNC: NEGATIVE — SIGNIFICANT CHANGE UP
NRBC # BLD AUTO: 0 K/UL — SIGNIFICANT CHANGE UP (ref 0–0)
NRBC # BLD AUTO: 0 K/UL — SIGNIFICANT CHANGE UP (ref 0–0)
NRBC # FLD: 0 K/UL — SIGNIFICANT CHANGE UP (ref 0–0)
NRBC # FLD: 0 K/UL — SIGNIFICANT CHANGE UP (ref 0–0)
NRBC BLD AUTO-RTO: 0 /100 WBCS — SIGNIFICANT CHANGE UP (ref 0–0)
NRBC BLD AUTO-RTO: 0 /100 WBCS — SIGNIFICANT CHANGE UP (ref 0–0)
PH UR: 5.5 — SIGNIFICANT CHANGE UP (ref 5–8)
PHOSPHATE SERPL-MCNC: 1.6 MG/DL — LOW (ref 2.4–4.7)
PHOSPHATE SERPL-MCNC: 3 MG/DL — SIGNIFICANT CHANGE UP (ref 2.4–4.7)
PLATELET # BLD AUTO: 228 K/UL — SIGNIFICANT CHANGE UP (ref 150–400)
PLATELET # BLD AUTO: 233 K/UL — SIGNIFICANT CHANGE UP (ref 150–400)
PMV BLD: 9.2 FL — SIGNIFICANT CHANGE UP (ref 7–13)
PMV BLD: 9.4 FL — SIGNIFICANT CHANGE UP (ref 7–13)
POTASSIUM SERPL-MCNC: 4 MMOL/L — SIGNIFICANT CHANGE UP (ref 3.5–5.3)
POTASSIUM SERPL-MCNC: 4.1 MMOL/L — SIGNIFICANT CHANGE UP (ref 3.5–5.3)
POTASSIUM SERPL-SCNC: 4 MMOL/L — SIGNIFICANT CHANGE UP (ref 3.5–5.3)
POTASSIUM SERPL-SCNC: 4.1 MMOL/L — SIGNIFICANT CHANGE UP (ref 3.5–5.3)
PROT SERPL-MCNC: 6.1 G/DL — LOW (ref 6.6–8.7)
PROT SERPL-MCNC: 6.2 G/DL — LOW (ref 6.6–8.7)
PROT UR-MCNC: 30 MG/DL
RBC # BLD: 4.46 M/UL — SIGNIFICANT CHANGE UP (ref 4.2–5.8)
RBC # BLD: 4.61 M/UL — SIGNIFICANT CHANGE UP (ref 4.2–5.8)
RBC # FLD: 13.9 % — SIGNIFICANT CHANGE UP (ref 10.3–14.5)
RBC # FLD: 14.5 % — SIGNIFICANT CHANGE UP (ref 10.3–14.5)
RBC CASTS # UR COMP ASSIST: 9 /HPF — HIGH (ref 0–4)
S AUREUS DNA NOSE QL NAA+PROBE: DETECTED
SODIUM SERPL-SCNC: 137 MMOL/L — SIGNIFICANT CHANGE UP (ref 135–145)
SODIUM SERPL-SCNC: 137 MMOL/L — SIGNIFICANT CHANGE UP (ref 135–145)
SP GR SPEC: >1.03 — HIGH (ref 1–1.03)
SPECIMEN SOURCE: SIGNIFICANT CHANGE UP
SQUAMOUS # UR AUTO: 3 /HPF — SIGNIFICANT CHANGE UP (ref 0–5)
UROBILINOGEN FLD QL: 1 MG/DL — SIGNIFICANT CHANGE UP (ref 0.2–1)
WBC # BLD: 11.71 K/UL — HIGH (ref 3.8–10.5)
WBC # BLD: 15.18 K/UL — HIGH (ref 3.8–10.5)
WBC # FLD AUTO: 11.71 K/UL — HIGH (ref 3.8–10.5)
WBC # FLD AUTO: 15.18 K/UL — HIGH (ref 3.8–10.5)
WBC UR QL: 2 /HPF — SIGNIFICANT CHANGE UP (ref 0–5)

## 2025-05-11 PROCEDURE — 99291 CRITICAL CARE FIRST HOUR: CPT

## 2025-05-11 PROCEDURE — 93306 TTE W/DOPPLER COMPLETE: CPT | Mod: 26

## 2025-05-11 PROCEDURE — 95720 EEG PHY/QHP EA INCR W/VEEG: CPT

## 2025-05-11 RX ORDER — LEVETIRACETAM 10 MG/ML
1500 INJECTION, SOLUTION INTRAVENOUS ONCE
Refills: 0 | Status: DISCONTINUED | OUTPATIENT
Start: 2025-05-11 | End: 2025-05-11

## 2025-05-11 RX ORDER — LEVETIRACETAM 10 MG/ML
1000 INJECTION, SOLUTION INTRAVENOUS EVERY 12 HOURS
Refills: 0 | Status: DISCONTINUED | OUTPATIENT
Start: 2025-05-11 | End: 2025-05-17

## 2025-05-11 RX ORDER — SODIUM CHLORIDE 9 G/1000ML
1000 INJECTION, SOLUTION INTRAVENOUS ONCE
Refills: 0 | Status: COMPLETED | OUTPATIENT
Start: 2025-05-11 | End: 2025-05-11

## 2025-05-11 RX ORDER — DEXMEDETOMIDINE HYDROCHLORIDE IN SODIUM CHLORIDE 4 UG/ML
0.5 INJECTION INTRAVENOUS
Qty: 200 | Refills: 0 | Status: DISCONTINUED | OUTPATIENT
Start: 2025-05-11 | End: 2025-05-12

## 2025-05-11 RX ORDER — MAGNESIUM SULFATE 500 MG/ML
2 SYRINGE (ML) INJECTION ONCE
Refills: 0 | Status: COMPLETED | OUTPATIENT
Start: 2025-05-11 | End: 2025-05-11

## 2025-05-11 RX ORDER — MUPIROCIN CALCIUM 20 MG/G
1 CREAM TOPICAL
Refills: 0 | Status: COMPLETED | OUTPATIENT
Start: 2025-05-11 | End: 2025-05-15

## 2025-05-11 RX ORDER — POTASSIUM PHOSPHATE, MONOBASIC POTASSIUM PHOSPHATE, DIBASIC INJECTION, 236; 224 MG/ML; MG/ML
15 SOLUTION, CONCENTRATE INTRAVENOUS ONCE
Refills: 0 | Status: COMPLETED | OUTPATIENT
Start: 2025-05-11 | End: 2025-05-11

## 2025-05-11 RX ORDER — PROPOFOL 10 MG/ML
40 INJECTION, EMULSION INTRAVENOUS
Qty: 1000 | Refills: 0 | Status: DISCONTINUED | OUTPATIENT
Start: 2025-05-11 | End: 2025-05-12

## 2025-05-11 RX ADMIN — DEXMEDETOMIDINE HYDROCHLORIDE IN SODIUM CHLORIDE 10.2 MICROGRAM(S)/KG/HR: 4 INJECTION INTRAVENOUS at 04:42

## 2025-05-11 RX ADMIN — POLYETHYLENE GLYCOL 3350 17 GRAM(S): 17 POWDER, FOR SOLUTION ORAL at 05:09

## 2025-05-11 RX ADMIN — PROPOFOL 19.6 MICROGRAM(S)/KG/MIN: 10 INJECTION, EMULSION INTRAVENOUS at 22:19

## 2025-05-11 RX ADMIN — LEVETIRACETAM 1000 MILLIGRAM(S): 10 INJECTION, SOLUTION INTRAVENOUS at 17:17

## 2025-05-11 RX ADMIN — MUPIROCIN CALCIUM 1 APPLICATION(S): 20 CREAM TOPICAL at 17:17

## 2025-05-11 RX ADMIN — Medication 2 TABLET(S): at 22:16

## 2025-05-11 RX ADMIN — ENOXAPARIN SODIUM 40 MILLIGRAM(S): 100 INJECTION SUBCUTANEOUS at 05:09

## 2025-05-11 RX ADMIN — POLYETHYLENE GLYCOL 3350 17 GRAM(S): 17 POWDER, FOR SOLUTION ORAL at 17:16

## 2025-05-11 RX ADMIN — PROPOFOL 14.7 MICROGRAM(S)/KG/MIN: 10 INJECTION, EMULSION INTRAVENOUS at 00:44

## 2025-05-11 RX ADMIN — LEVETIRACETAM 1500 MILLIGRAM(S): 10 INJECTION, SOLUTION INTRAVENOUS at 15:11

## 2025-05-11 RX ADMIN — Medication 25 GRAM(S): at 10:55

## 2025-05-11 RX ADMIN — DEXMEDETOMIDINE HYDROCHLORIDE IN SODIUM CHLORIDE 10.2 MICROGRAM(S)/KG/HR: 4 INJECTION INTRAVENOUS at 00:39

## 2025-05-11 RX ADMIN — Medication 25 GRAM(S): at 04:16

## 2025-05-11 RX ADMIN — MUPIROCIN CALCIUM 1 APPLICATION(S): 20 CREAM TOPICAL at 05:09

## 2025-05-11 RX ADMIN — SODIUM CHLORIDE 1000 MILLILITER(S): 9 INJECTION, SOLUTION INTRAVENOUS at 06:58

## 2025-05-11 RX ADMIN — PROPOFOL 14.7 MICROGRAM(S)/KG/MIN: 10 INJECTION, EMULSION INTRAVENOUS at 18:24

## 2025-05-11 RX ADMIN — Medication 40 MILLIGRAM(S): at 11:12

## 2025-05-11 RX ADMIN — POTASSIUM PHOSPHATE, MONOBASIC POTASSIUM PHOSPHATE, DIBASIC INJECTION, 62.5 MILLIMOLE(S): 236; 224 SOLUTION, CONCENTRATE INTRAVENOUS at 04:17

## 2025-05-11 RX ADMIN — Medication 15 MILLILITER(S): at 05:09

## 2025-05-11 RX ADMIN — Medication 1 APPLICATION(S): at 05:09

## 2025-05-11 RX ADMIN — Medication 15 MILLILITER(S): at 17:16

## 2025-05-11 RX ADMIN — Medication 25 GRAM(S): at 01:18

## 2025-05-11 RX ADMIN — Medication 2 TABLET(S): at 05:09

## 2025-05-11 RX ADMIN — PROPOFOL 14.7 MICROGRAM(S)/KG/MIN: 10 INJECTION, EMULSION INTRAVENOUS at 17:16

## 2025-05-11 RX ADMIN — Medication 25 GRAM(S): at 17:17

## 2025-05-12 LAB
ALBUMIN SERPL ELPH-MCNC: 3.2 G/DL — LOW (ref 3.3–5.2)
ALP SERPL-CCNC: 107 U/L — SIGNIFICANT CHANGE UP (ref 40–120)
ALT FLD-CCNC: 102 U/L — HIGH
ANION GAP SERPL CALC-SCNC: 12 MMOL/L — SIGNIFICANT CHANGE UP (ref 5–17)
AST SERPL-CCNC: 199 U/L — HIGH
BASOPHILS # BLD AUTO: 0.02 K/UL — SIGNIFICANT CHANGE UP (ref 0–0.2)
BASOPHILS NFR BLD AUTO: 0.1 % — SIGNIFICANT CHANGE UP (ref 0–2)
BILIRUB SERPL-MCNC: 0.9 MG/DL — SIGNIFICANT CHANGE UP (ref 0.4–2)
BUN SERPL-MCNC: 23.3 MG/DL — HIGH (ref 8–20)
CALCIUM SERPL-MCNC: 8.5 MG/DL — SIGNIFICANT CHANGE UP (ref 8.4–10.5)
CHLORIDE SERPL-SCNC: 103 MMOL/L — SIGNIFICANT CHANGE UP (ref 96–108)
CO2 SERPL-SCNC: 22 MMOL/L — SIGNIFICANT CHANGE UP (ref 22–29)
CREAT SERPL-MCNC: 0.66 MG/DL — SIGNIFICANT CHANGE UP (ref 0.5–1.3)
CULTURE RESULTS: ABNORMAL
EGFR: 122 ML/MIN/1.73M2 — SIGNIFICANT CHANGE UP
EGFR: 122 ML/MIN/1.73M2 — SIGNIFICANT CHANGE UP
EOSINOPHIL # BLD AUTO: 0.03 K/UL — SIGNIFICANT CHANGE UP (ref 0–0.5)
EOSINOPHIL NFR BLD AUTO: 0.2 % — SIGNIFICANT CHANGE UP (ref 0–6)
GLUCOSE BLDC GLUCOMTR-MCNC: 85 MG/DL — SIGNIFICANT CHANGE UP (ref 70–99)
GLUCOSE BLDC GLUCOMTR-MCNC: 89 MG/DL — SIGNIFICANT CHANGE UP (ref 70–99)
GLUCOSE SERPL-MCNC: 118 MG/DL — HIGH (ref 70–99)
HCT VFR BLD CALC: 37.1 % — LOW (ref 39–50)
HGB BLD-MCNC: 12.4 G/DL — LOW (ref 13–17)
IMM GRANULOCYTES # BLD AUTO: 0.07 K/UL — SIGNIFICANT CHANGE UP (ref 0–0.07)
IMM GRANULOCYTES NFR BLD AUTO: 0.5 % — SIGNIFICANT CHANGE UP (ref 0–0.9)
LYMPHOCYTES # BLD AUTO: 1.03 K/UL — SIGNIFICANT CHANGE UP (ref 1–3.3)
LYMPHOCYTES NFR BLD AUTO: 7.3 % — LOW (ref 13–44)
MAGNESIUM SERPL-MCNC: 2.1 MG/DL — SIGNIFICANT CHANGE UP (ref 1.6–2.6)
MCHC RBC-ENTMCNC: 31 PG — SIGNIFICANT CHANGE UP (ref 27–34)
MCHC RBC-ENTMCNC: 33.4 G/DL — SIGNIFICANT CHANGE UP (ref 32–36)
MCV RBC AUTO: 92.8 FL — SIGNIFICANT CHANGE UP (ref 80–100)
MONOCYTES # BLD AUTO: 0.76 K/UL — SIGNIFICANT CHANGE UP (ref 0–0.9)
MONOCYTES NFR BLD AUTO: 5.4 % — SIGNIFICANT CHANGE UP (ref 2–14)
NEUTROPHILS # BLD AUTO: 12.22 K/UL — HIGH (ref 1.8–7.4)
NEUTROPHILS NFR BLD AUTO: 86.5 % — HIGH (ref 43–77)
NRBC # BLD AUTO: 0 K/UL — SIGNIFICANT CHANGE UP (ref 0–0)
NRBC # FLD: 0 K/UL — SIGNIFICANT CHANGE UP (ref 0–0)
NRBC BLD AUTO-RTO: 0 /100 WBCS — SIGNIFICANT CHANGE UP (ref 0–0)
PHOSPHATE SERPL-MCNC: 1.8 MG/DL — LOW (ref 2.4–4.7)
PLATELET # BLD AUTO: 238 K/UL — SIGNIFICANT CHANGE UP (ref 150–400)
PMV BLD: 9.9 FL — SIGNIFICANT CHANGE UP (ref 7–13)
POTASSIUM SERPL-MCNC: 3.9 MMOL/L — SIGNIFICANT CHANGE UP (ref 3.5–5.3)
POTASSIUM SERPL-SCNC: 3.9 MMOL/L — SIGNIFICANT CHANGE UP (ref 3.5–5.3)
PROT SERPL-MCNC: 5.7 G/DL — LOW (ref 6.6–8.7)
RBC # BLD: 4 M/UL — LOW (ref 4.2–5.8)
RBC # FLD: 14.6 % — HIGH (ref 10.3–14.5)
SODIUM SERPL-SCNC: 137 MMOL/L — SIGNIFICANT CHANGE UP (ref 135–145)
SPECIMEN SOURCE: SIGNIFICANT CHANGE UP
WBC # BLD: 14.13 K/UL — HIGH (ref 3.8–10.5)
WBC # FLD AUTO: 14.13 K/UL — HIGH (ref 3.8–10.5)

## 2025-05-12 PROCEDURE — 95720 EEG PHY/QHP EA INCR W/VEEG: CPT

## 2025-05-12 PROCEDURE — 99233 SBSQ HOSP IP/OBS HIGH 50: CPT | Mod: GC

## 2025-05-12 PROCEDURE — 70450 CT HEAD/BRAIN W/O DYE: CPT | Mod: 26

## 2025-05-12 RX ORDER — ACETAMINOPHEN 500 MG/5ML
1000 LIQUID (ML) ORAL ONCE
Refills: 0 | Status: COMPLETED | OUTPATIENT
Start: 2025-05-12 | End: 2025-05-12

## 2025-05-12 RX ORDER — POTASSIUM PHOSPHATE, MONOBASIC POTASSIUM PHOSPHATE, DIBASIC INJECTION, 236; 224 MG/ML; MG/ML
30 SOLUTION, CONCENTRATE INTRAVENOUS ONCE
Refills: 0 | Status: COMPLETED | OUTPATIENT
Start: 2025-05-12 | End: 2025-05-12

## 2025-05-12 RX ORDER — LANOLIN/MINERAL OIL/PETROLATUM
1 OINTMENT (GRAM) OPHTHALMIC (EYE)
Refills: 0 | Status: DISCONTINUED | OUTPATIENT
Start: 2025-05-12 | End: 2025-05-17

## 2025-05-12 RX ADMIN — Medication 25 GRAM(S): at 02:03

## 2025-05-12 RX ADMIN — MUPIROCIN CALCIUM 1 APPLICATION(S): 20 CREAM TOPICAL at 05:04

## 2025-05-12 RX ADMIN — PROPOFOL 19.6 MICROGRAM(S)/KG/MIN: 10 INJECTION, EMULSION INTRAVENOUS at 06:29

## 2025-05-12 RX ADMIN — POTASSIUM PHOSPHATE, MONOBASIC POTASSIUM PHOSPHATE, DIBASIC INJECTION, 83.33 MILLIMOLE(S): 236; 224 SOLUTION, CONCENTRATE INTRAVENOUS at 05:12

## 2025-05-12 RX ADMIN — ENOXAPARIN SODIUM 40 MILLIGRAM(S): 100 INJECTION SUBCUTANEOUS at 05:03

## 2025-05-12 RX ADMIN — Medication 40 MILLIGRAM(S): at 11:24

## 2025-05-12 RX ADMIN — Medication 25 GRAM(S): at 18:21

## 2025-05-12 RX ADMIN — POLYETHYLENE GLYCOL 3350 17 GRAM(S): 17 POWDER, FOR SOLUTION ORAL at 05:04

## 2025-05-12 RX ADMIN — Medication 1 APPLICATION(S): at 05:04

## 2025-05-12 RX ADMIN — Medication 25 GRAM(S): at 09:11

## 2025-05-12 RX ADMIN — LEVETIRACETAM 1000 MILLIGRAM(S): 10 INJECTION, SOLUTION INTRAVENOUS at 05:04

## 2025-05-12 RX ADMIN — PROPOFOL 19.6 MICROGRAM(S)/KG/MIN: 10 INJECTION, EMULSION INTRAVENOUS at 00:43

## 2025-05-12 RX ADMIN — Medication 15 MILLILITER(S): at 05:04

## 2025-05-12 RX ADMIN — LEVETIRACETAM 1000 MILLIGRAM(S): 10 INJECTION, SOLUTION INTRAVENOUS at 18:21

## 2025-05-12 RX ADMIN — Medication 400 MILLIGRAM(S): at 18:42

## 2025-05-12 RX ADMIN — Medication 15 MILLILITER(S): at 18:37

## 2025-05-12 RX ADMIN — POLYETHYLENE GLYCOL 3350 17 GRAM(S): 17 POWDER, FOR SOLUTION ORAL at 18:21

## 2025-05-12 RX ADMIN — Medication 1 APPLICATION(S): at 18:42

## 2025-05-12 RX ADMIN — Medication 1000 MILLIGRAM(S): at 19:00

## 2025-05-12 RX ADMIN — PROPOFOL 19.6 MICROGRAM(S)/KG/MIN: 10 INJECTION, EMULSION INTRAVENOUS at 10:49

## 2025-05-12 RX ADMIN — MUPIROCIN CALCIUM 1 APPLICATION(S): 20 CREAM TOPICAL at 18:21

## 2025-05-13 LAB
ALBUMIN SERPL ELPH-MCNC: 3.1 G/DL — LOW (ref 3.3–5.2)
ALP SERPL-CCNC: 101 U/L — SIGNIFICANT CHANGE UP (ref 40–120)
ALT FLD-CCNC: 71 U/L — HIGH
ANION GAP SERPL CALC-SCNC: 14 MMOL/L — SIGNIFICANT CHANGE UP (ref 5–17)
AST SERPL-CCNC: 132 U/L — HIGH
BILIRUB SERPL-MCNC: 1 MG/DL — SIGNIFICANT CHANGE UP (ref 0.4–2)
BUN SERPL-MCNC: 14.4 MG/DL — SIGNIFICANT CHANGE UP (ref 8–20)
CALCIUM SERPL-MCNC: 7.8 MG/DL — LOW (ref 8.4–10.5)
CHLORIDE SERPL-SCNC: 100 MMOL/L — SIGNIFICANT CHANGE UP (ref 96–108)
CO2 SERPL-SCNC: 24 MMOL/L — SIGNIFICANT CHANGE UP (ref 22–29)
CREAT SERPL-MCNC: 0.63 MG/DL — SIGNIFICANT CHANGE UP (ref 0.5–1.3)
EGFR: 123 ML/MIN/1.73M2 — SIGNIFICANT CHANGE UP
EGFR: 123 ML/MIN/1.73M2 — SIGNIFICANT CHANGE UP
GLUCOSE SERPL-MCNC: 225 MG/DL — HIGH (ref 70–99)
HCT VFR BLD CALC: 33.1 % — LOW (ref 39–50)
HGB BLD-MCNC: 11 G/DL — LOW (ref 13–17)
MAGNESIUM SERPL-MCNC: 1.8 MG/DL — SIGNIFICANT CHANGE UP (ref 1.6–2.6)
MCHC RBC-ENTMCNC: 31.3 PG — SIGNIFICANT CHANGE UP (ref 27–34)
MCHC RBC-ENTMCNC: 33.2 G/DL — SIGNIFICANT CHANGE UP (ref 32–36)
MCV RBC AUTO: 94.3 FL — SIGNIFICANT CHANGE UP (ref 80–100)
NRBC # BLD AUTO: 0 K/UL — SIGNIFICANT CHANGE UP (ref 0–0)
NRBC # FLD: 0 K/UL — SIGNIFICANT CHANGE UP (ref 0–0)
NRBC BLD AUTO-RTO: 0 /100 WBCS — SIGNIFICANT CHANGE UP (ref 0–0)
PHOSPHATE SERPL-MCNC: 2.6 MG/DL — SIGNIFICANT CHANGE UP (ref 2.4–4.7)
PLATELET # BLD AUTO: 190 K/UL — SIGNIFICANT CHANGE UP (ref 150–400)
PMV BLD: 9.4 FL — SIGNIFICANT CHANGE UP (ref 7–13)
POTASSIUM SERPL-MCNC: 3.3 MMOL/L — LOW (ref 3.5–5.3)
POTASSIUM SERPL-SCNC: 3.3 MMOL/L — LOW (ref 3.5–5.3)
PROT SERPL-MCNC: 5.6 G/DL — LOW (ref 6.6–8.7)
RBC # BLD: 3.51 M/UL — LOW (ref 4.2–5.8)
RBC # FLD: 15.1 % — HIGH (ref 10.3–14.5)
SODIUM SERPL-SCNC: 138 MMOL/L — SIGNIFICANT CHANGE UP (ref 135–145)
WBC # BLD: 14.14 K/UL — HIGH (ref 3.8–10.5)
WBC # FLD AUTO: 14.14 K/UL — HIGH (ref 3.8–10.5)

## 2025-05-13 PROCEDURE — 99233 SBSQ HOSP IP/OBS HIGH 50: CPT | Mod: GC

## 2025-05-13 PROCEDURE — 99223 1ST HOSP IP/OBS HIGH 75: CPT

## 2025-05-13 PROCEDURE — 99497 ADVNCD CARE PLAN 30 MIN: CPT | Mod: 25

## 2025-05-13 PROCEDURE — 95720 EEG PHY/QHP EA INCR W/VEEG: CPT

## 2025-05-13 PROCEDURE — 99498 ADVNCD CARE PLAN ADDL 30 MIN: CPT | Mod: 25

## 2025-05-13 RX ORDER — MAGNESIUM SULFATE 500 MG/ML
2 SYRINGE (ML) INJECTION ONCE
Refills: 0 | Status: COMPLETED | OUTPATIENT
Start: 2025-05-13 | End: 2025-05-13

## 2025-05-13 RX ORDER — SODIUM CHLORIDE 9 G/1000ML
1000 INJECTION, SOLUTION INTRAVENOUS
Refills: 0 | Status: DISCONTINUED | OUTPATIENT
Start: 2025-05-13 | End: 2025-05-17

## 2025-05-13 RX ORDER — ACETAMINOPHEN 500 MG/5ML
1000 LIQUID (ML) ORAL ONCE
Refills: 0 | Status: COMPLETED | OUTPATIENT
Start: 2025-05-13 | End: 2025-05-13

## 2025-05-13 RX ADMIN — Medication 400 MILLIGRAM(S): at 17:29

## 2025-05-13 RX ADMIN — Medication 100 MILLIEQUIVALENT(S): at 07:39

## 2025-05-13 RX ADMIN — Medication 100 MILLIEQUIVALENT(S): at 06:03

## 2025-05-13 RX ADMIN — LEVETIRACETAM 1000 MILLIGRAM(S): 10 INJECTION, SOLUTION INTRAVENOUS at 06:03

## 2025-05-13 RX ADMIN — Medication 15 MILLILITER(S): at 17:31

## 2025-05-13 RX ADMIN — Medication 1 APPLICATION(S): at 06:03

## 2025-05-13 RX ADMIN — Medication 25 GRAM(S): at 06:03

## 2025-05-13 RX ADMIN — Medication 1000 MILLIGRAM(S): at 17:55

## 2025-05-13 RX ADMIN — LEVETIRACETAM 1000 MILLIGRAM(S): 10 INJECTION, SOLUTION INTRAVENOUS at 17:31

## 2025-05-13 RX ADMIN — MUPIROCIN CALCIUM 1 APPLICATION(S): 20 CREAM TOPICAL at 17:31

## 2025-05-13 RX ADMIN — Medication 1 APPLICATION(S): at 17:49

## 2025-05-13 RX ADMIN — Medication 1 APPLICATION(S): at 06:00

## 2025-05-13 RX ADMIN — Medication 40 MILLIEQUIVALENT(S): at 06:03

## 2025-05-13 RX ADMIN — Medication 15 MILLILITER(S): at 06:04

## 2025-05-13 RX ADMIN — Medication 25 GRAM(S): at 17:30

## 2025-05-13 RX ADMIN — ENOXAPARIN SODIUM 40 MILLIGRAM(S): 100 INJECTION SUBCUTANEOUS at 06:00

## 2025-05-13 RX ADMIN — Medication 100 MILLIEQUIVALENT(S): at 08:57

## 2025-05-13 RX ADMIN — MUPIROCIN CALCIUM 1 APPLICATION(S): 20 CREAM TOPICAL at 06:03

## 2025-05-13 RX ADMIN — Medication 25 GRAM(S): at 09:40

## 2025-05-13 RX ADMIN — Medication 25 GRAM(S): at 02:00

## 2025-05-13 RX ADMIN — Medication 40 MILLIGRAM(S): at 11:34

## 2025-05-13 RX ADMIN — SODIUM CHLORIDE 75 MILLILITER(S): 9 INJECTION, SOLUTION INTRAVENOUS at 09:41

## 2025-05-14 ENCOUNTER — RESULT REVIEW (OUTPATIENT)
Age: 40
End: 2025-05-14

## 2025-05-14 LAB
ALBUMIN SERPL ELPH-MCNC: 3.1 G/DL — LOW (ref 3.3–5.2)
ALBUMIN SERPL ELPH-MCNC: 3.1 G/DL — LOW (ref 3.3–5.2)
ALBUMIN SERPL ELPH-MCNC: 3.3 G/DL — SIGNIFICANT CHANGE UP (ref 3.3–5.2)
ALBUMIN SERPL ELPH-MCNC: 3.3 G/DL — SIGNIFICANT CHANGE UP (ref 3.3–5.2)
ALP SERPL-CCNC: 96 U/L — SIGNIFICANT CHANGE UP (ref 40–120)
ALP SERPL-CCNC: 98 U/L — SIGNIFICANT CHANGE UP (ref 40–120)
ALP SERPL-CCNC: 98 U/L — SIGNIFICANT CHANGE UP (ref 40–120)
ALP SERPL-CCNC: 99 U/L — SIGNIFICANT CHANGE UP (ref 40–120)
ALT FLD-CCNC: 44 U/L — HIGH
ALT FLD-CCNC: 49 U/L — HIGH
ALT FLD-CCNC: 53 U/L — HIGH
ALT FLD-CCNC: 54 U/L — HIGH
AMYLASE P1 CFR SERPL: 19 U/L — LOW (ref 36–128)
AMYLASE P1 CFR SERPL: 20 U/L — LOW (ref 36–128)
AMYLASE P1 CFR SERPL: 21 U/L — LOW (ref 36–128)
AMYLASE P1 CFR SERPL: 23 U/L — LOW (ref 36–128)
ANION GAP SERPL CALC-SCNC: 11 MMOL/L — SIGNIFICANT CHANGE UP (ref 5–17)
ANION GAP SERPL CALC-SCNC: 13 MMOL/L — SIGNIFICANT CHANGE UP (ref 5–17)
ANION GAP SERPL CALC-SCNC: 13 MMOL/L — SIGNIFICANT CHANGE UP (ref 5–17)
ANION GAP SERPL CALC-SCNC: 14 MMOL/L — SIGNIFICANT CHANGE UP (ref 5–17)
APPEARANCE UR: CLEAR — SIGNIFICANT CHANGE UP
APTT BLD: 22.4 SEC — LOW (ref 26.1–36.8)
APTT BLD: 26.8 SEC — SIGNIFICANT CHANGE UP (ref 26.1–36.8)
APTT BLD: 26.8 SEC — SIGNIFICANT CHANGE UP (ref 26.1–36.8)
AST SERPL-CCNC: 57 U/L — HIGH
AST SERPL-CCNC: 64 U/L — HIGH
AST SERPL-CCNC: 80 U/L — HIGH
AST SERPL-CCNC: 85 U/L — HIGH
BACTERIA # UR AUTO: NEGATIVE /HPF — SIGNIFICANT CHANGE UP
BASOPHILS # BLD AUTO: 0.03 K/UL — SIGNIFICANT CHANGE UP (ref 0–0.2)
BASOPHILS # BLD AUTO: 0.04 K/UL — SIGNIFICANT CHANGE UP (ref 0–0.2)
BASOPHILS NFR BLD AUTO: 0.2 % — SIGNIFICANT CHANGE UP (ref 0–2)
BASOPHILS NFR BLD AUTO: 0.3 % — SIGNIFICANT CHANGE UP (ref 0–2)
BILIRUB DIRECT SERPL-MCNC: 0.2 MG/DL — SIGNIFICANT CHANGE UP (ref 0–0.3)
BILIRUB INDIRECT FLD-MCNC: 0.5 MG/DL — SIGNIFICANT CHANGE UP (ref 0.2–1)
BILIRUB INDIRECT FLD-MCNC: 0.6 MG/DL — SIGNIFICANT CHANGE UP (ref 0.2–1)
BILIRUB INDIRECT FLD-MCNC: 0.9 MG/DL — SIGNIFICANT CHANGE UP (ref 0.2–1)
BILIRUB INDIRECT FLD-MCNC: 0.9 MG/DL — SIGNIFICANT CHANGE UP (ref 0.2–1)
BILIRUB SERPL-MCNC: 0.7 MG/DL — SIGNIFICANT CHANGE UP (ref 0.4–2)
BILIRUB SERPL-MCNC: 0.8 MG/DL — SIGNIFICANT CHANGE UP (ref 0.4–2)
BILIRUB SERPL-MCNC: 1.1 MG/DL — SIGNIFICANT CHANGE UP (ref 0.4–2)
BILIRUB SERPL-MCNC: 1.2 MG/DL — SIGNIFICANT CHANGE UP (ref 0.4–2)
BILIRUB UR-MCNC: NEGATIVE — SIGNIFICANT CHANGE UP
BLD GP AB SCN SERPL QL: SIGNIFICANT CHANGE UP
BUN SERPL-MCNC: 15.5 MG/DL — SIGNIFICANT CHANGE UP (ref 8–20)
BUN SERPL-MCNC: 16 MG/DL — SIGNIFICANT CHANGE UP (ref 8–20)
BUN SERPL-MCNC: 18 MG/DL — SIGNIFICANT CHANGE UP (ref 8–20)
BUN SERPL-MCNC: 19.1 MG/DL — SIGNIFICANT CHANGE UP (ref 8–20)
CALCIUM SERPL-MCNC: 8.3 MG/DL — LOW (ref 8.4–10.5)
CALCIUM SERPL-MCNC: 8.4 MG/DL — SIGNIFICANT CHANGE UP (ref 8.4–10.5)
CALCIUM SERPL-MCNC: 8.4 MG/DL — SIGNIFICANT CHANGE UP (ref 8.4–10.5)
CALCIUM SERPL-MCNC: 8.7 MG/DL — SIGNIFICANT CHANGE UP (ref 8.4–10.5)
CAST: 0 /LPF — SIGNIFICANT CHANGE UP (ref 0–4)
CHLORIDE SERPL-SCNC: 102 MMOL/L — SIGNIFICANT CHANGE UP (ref 96–108)
CHLORIDE SERPL-SCNC: 98 MMOL/L — SIGNIFICANT CHANGE UP (ref 96–108)
CHLORIDE SERPL-SCNC: 99 MMOL/L — SIGNIFICANT CHANGE UP (ref 96–108)
CHLORIDE SERPL-SCNC: 99 MMOL/L — SIGNIFICANT CHANGE UP (ref 96–108)
CK MB CFR SERPL CALC: 1.5 NG/ML — SIGNIFICANT CHANGE UP (ref 0–6.7)
CK MB CFR SERPL CALC: 1.5 NG/ML — SIGNIFICANT CHANGE UP (ref 0–6.7)
CK MB CFR SERPL CALC: 1.8 NG/ML — SIGNIFICANT CHANGE UP (ref 0–6.7)
CK MB CFR SERPL CALC: 1.8 NG/ML — SIGNIFICANT CHANGE UP (ref 0–6.7)
CK SERPL-CCNC: 317 U/L — HIGH (ref 30–200)
CK SERPL-CCNC: 405 U/L — HIGH (ref 30–200)
CK SERPL-CCNC: 588 U/L — HIGH (ref 30–200)
CK SERPL-CCNC: 763 U/L — HIGH (ref 30–200)
CO2 SERPL-SCNC: 22 MMOL/L — SIGNIFICANT CHANGE UP (ref 22–29)
CO2 SERPL-SCNC: 24 MMOL/L — SIGNIFICANT CHANGE UP (ref 22–29)
CO2 SERPL-SCNC: 25 MMOL/L — SIGNIFICANT CHANGE UP (ref 22–29)
CO2 SERPL-SCNC: 26 MMOL/L — SIGNIFICANT CHANGE UP (ref 22–29)
COLOR SPEC: SIGNIFICANT CHANGE UP
CREAT SERPL-MCNC: 0.48 MG/DL — LOW (ref 0.5–1.3)
CREAT SERPL-MCNC: 0.55 MG/DL — SIGNIFICANT CHANGE UP (ref 0.5–1.3)
CREAT SERPL-MCNC: 0.55 MG/DL — SIGNIFICANT CHANGE UP (ref 0.5–1.3)
CREAT SERPL-MCNC: 0.58 MG/DL — SIGNIFICANT CHANGE UP (ref 0.5–1.3)
DIFF PNL FLD: ABNORMAL
EGFR: 126 ML/MIN/1.73M2 — SIGNIFICANT CHANGE UP
EGFR: 126 ML/MIN/1.73M2 — SIGNIFICANT CHANGE UP
EGFR: 128 ML/MIN/1.73M2 — SIGNIFICANT CHANGE UP
EGFR: 133 ML/MIN/1.73M2 — SIGNIFICANT CHANGE UP
EGFR: 133 ML/MIN/1.73M2 — SIGNIFICANT CHANGE UP
EOSINOPHIL # BLD AUTO: 0.06 K/UL — SIGNIFICANT CHANGE UP (ref 0–0.5)
EOSINOPHIL # BLD AUTO: 0.06 K/UL — SIGNIFICANT CHANGE UP (ref 0–0.5)
EOSINOPHIL # BLD AUTO: 0.09 K/UL — SIGNIFICANT CHANGE UP (ref 0–0.5)
EOSINOPHIL # BLD AUTO: 0.09 K/UL — SIGNIFICANT CHANGE UP (ref 0–0.5)
EOSINOPHIL NFR BLD AUTO: 0.4 % — SIGNIFICANT CHANGE UP (ref 0–6)
EOSINOPHIL NFR BLD AUTO: 0.4 % — SIGNIFICANT CHANGE UP (ref 0–6)
EOSINOPHIL NFR BLD AUTO: 0.6 % — SIGNIFICANT CHANGE UP (ref 0–6)
EOSINOPHIL NFR BLD AUTO: 0.7 % — SIGNIFICANT CHANGE UP (ref 0–6)
GAS PNL BLDA: SIGNIFICANT CHANGE UP
GLUCOSE BLDC GLUCOMTR-MCNC: 100 MG/DL — HIGH (ref 70–99)
GLUCOSE BLDC GLUCOMTR-MCNC: 111 MG/DL — HIGH (ref 70–99)
GLUCOSE SERPL-MCNC: 102 MG/DL — HIGH (ref 70–99)
GLUCOSE SERPL-MCNC: 105 MG/DL — HIGH (ref 70–99)
GLUCOSE SERPL-MCNC: 112 MG/DL — HIGH (ref 70–99)
GLUCOSE SERPL-MCNC: 123 MG/DL — HIGH (ref 70–99)
GLUCOSE UR QL: NEGATIVE MG/DL — SIGNIFICANT CHANGE UP
HCT VFR BLD CALC: 33.9 % — LOW (ref 39–50)
HCT VFR BLD CALC: 34.1 % — LOW (ref 39–50)
HCT VFR BLD CALC: 34.6 % — LOW (ref 39–50)
HCT VFR BLD CALC: 35.6 % — LOW (ref 39–50)
HGB BLD-MCNC: 11.3 G/DL — LOW (ref 13–17)
HGB BLD-MCNC: 11.5 G/DL — LOW (ref 13–17)
HGB BLD-MCNC: 11.7 G/DL — LOW (ref 13–17)
HGB BLD-MCNC: 11.8 G/DL — LOW (ref 13–17)
IMM GRANULOCYTES # BLD AUTO: 0.15 K/UL — HIGH (ref 0–0.07)
IMM GRANULOCYTES # BLD AUTO: 0.25 K/UL — HIGH (ref 0–0.07)
IMM GRANULOCYTES # BLD AUTO: 0.25 K/UL — HIGH (ref 0–0.07)
IMM GRANULOCYTES # BLD AUTO: 0.28 K/UL — HIGH (ref 0–0.07)
IMM GRANULOCYTES NFR BLD AUTO: 1.1 % — HIGH (ref 0–0.9)
IMM GRANULOCYTES NFR BLD AUTO: 1.8 % — HIGH (ref 0–0.9)
IMM GRANULOCYTES NFR BLD AUTO: 1.8 % — HIGH (ref 0–0.9)
IMM GRANULOCYTES NFR BLD AUTO: 2 % — HIGH (ref 0–0.9)
INR BLD: 1.09 RATIO — SIGNIFICANT CHANGE UP (ref 0.85–1.16)
INR BLD: 1.12 RATIO — SIGNIFICANT CHANGE UP (ref 0.85–1.16)
INR BLD: 1.13 RATIO — SIGNIFICANT CHANGE UP (ref 0.85–1.16)
INR BLD: 1.13 RATIO — SIGNIFICANT CHANGE UP (ref 0.85–1.16)
KETONES UR QL: ABNORMAL MG/DL
LEUKOCYTE ESTERASE UR-ACNC: NEGATIVE — SIGNIFICANT CHANGE UP
LIDOCAIN IGE QN: 17 U/L — LOW (ref 22–51)
LIDOCAIN IGE QN: 17 U/L — LOW (ref 22–51)
LIDOCAIN IGE QN: 18 U/L — LOW (ref 22–51)
LIDOCAIN IGE QN: 20 U/L — LOW (ref 22–51)
LYMPHOCYTES # BLD AUTO: 0.75 K/UL — LOW (ref 1–3.3)
LYMPHOCYTES # BLD AUTO: 0.82 K/UL — LOW (ref 1–3.3)
LYMPHOCYTES # BLD AUTO: 0.89 K/UL — LOW (ref 1–3.3)
LYMPHOCYTES # BLD AUTO: 1.26 K/UL — SIGNIFICANT CHANGE UP (ref 1–3.3)
LYMPHOCYTES NFR BLD AUTO: 5.5 % — LOW (ref 13–44)
LYMPHOCYTES NFR BLD AUTO: 6 % — LOW (ref 13–44)
LYMPHOCYTES NFR BLD AUTO: 6.3 % — LOW (ref 13–44)
LYMPHOCYTES NFR BLD AUTO: 9.3 % — LOW (ref 13–44)
MAGNESIUM SERPL-MCNC: 1.8 MG/DL — SIGNIFICANT CHANGE UP (ref 1.6–2.6)
MAGNESIUM SERPL-MCNC: 1.8 MG/DL — SIGNIFICANT CHANGE UP (ref 1.8–2.6)
MAGNESIUM SERPL-MCNC: 1.9 MG/DL — SIGNIFICANT CHANGE UP (ref 1.6–2.6)
MAGNESIUM SERPL-MCNC: 2 MG/DL — SIGNIFICANT CHANGE UP (ref 1.8–2.6)
MCHC RBC-ENTMCNC: 30.8 PG — SIGNIFICANT CHANGE UP (ref 27–34)
MCHC RBC-ENTMCNC: 31 PG — SIGNIFICANT CHANGE UP (ref 27–34)
MCHC RBC-ENTMCNC: 31.3 PG — SIGNIFICANT CHANGE UP (ref 27–34)
MCHC RBC-ENTMCNC: 31.4 PG — SIGNIFICANT CHANGE UP (ref 27–34)
MCHC RBC-ENTMCNC: 33.1 G/DL — SIGNIFICANT CHANGE UP (ref 32–36)
MCHC RBC-ENTMCNC: 33.3 G/DL — SIGNIFICANT CHANGE UP (ref 32–36)
MCHC RBC-ENTMCNC: 33.7 G/DL — SIGNIFICANT CHANGE UP (ref 32–36)
MCHC RBC-ENTMCNC: 33.8 G/DL — SIGNIFICANT CHANGE UP (ref 32–36)
MCV RBC AUTO: 92.8 FL — SIGNIFICANT CHANGE UP (ref 80–100)
MCV RBC AUTO: 92.9 FL — SIGNIFICANT CHANGE UP (ref 80–100)
MCV RBC AUTO: 92.9 FL — SIGNIFICANT CHANGE UP (ref 80–100)
MCV RBC AUTO: 93 FL — SIGNIFICANT CHANGE UP (ref 80–100)
MONOCYTES # BLD AUTO: 0.8 K/UL — SIGNIFICANT CHANGE UP (ref 0–0.9)
MONOCYTES # BLD AUTO: 0.8 K/UL — SIGNIFICANT CHANGE UP (ref 0–0.9)
MONOCYTES # BLD AUTO: 0.83 K/UL — SIGNIFICANT CHANGE UP (ref 0–0.9)
MONOCYTES # BLD AUTO: 0.92 K/UL — HIGH (ref 0–0.9)
MONOCYTES NFR BLD AUTO: 5.8 % — SIGNIFICANT CHANGE UP (ref 2–14)
MONOCYTES NFR BLD AUTO: 5.9 % — SIGNIFICANT CHANGE UP (ref 2–14)
MONOCYTES NFR BLD AUTO: 6.1 % — SIGNIFICANT CHANGE UP (ref 2–14)
MONOCYTES NFR BLD AUTO: 6.5 % — SIGNIFICANT CHANGE UP (ref 2–14)
NEUTROPHILS # BLD AUTO: 11.22 K/UL — HIGH (ref 1.8–7.4)
NEUTROPHILS # BLD AUTO: 11.59 K/UL — HIGH (ref 1.8–7.4)
NEUTROPHILS # BLD AUTO: 11.82 K/UL — HIGH (ref 1.8–7.4)
NEUTROPHILS # BLD AUTO: 11.9 K/UL — HIGH (ref 1.8–7.4)
NEUTROPHILS NFR BLD AUTO: 82.8 % — HIGH (ref 43–77)
NEUTROPHILS NFR BLD AUTO: 84.5 % — HIGH (ref 43–77)
NEUTROPHILS NFR BLD AUTO: 85.4 % — HIGH (ref 43–77)
NEUTROPHILS NFR BLD AUTO: 86 % — HIGH (ref 43–77)
NITRITE UR-MCNC: NEGATIVE — SIGNIFICANT CHANGE UP
NRBC # BLD AUTO: 0 K/UL — SIGNIFICANT CHANGE UP (ref 0–0)
NRBC # FLD: 0 K/UL — SIGNIFICANT CHANGE UP (ref 0–0)
NRBC BLD AUTO-RTO: 0 /100 WBCS — SIGNIFICANT CHANGE UP (ref 0–0)
OSMOLALITY UR: 1068 MOSM/KG — HIGH (ref 300–1000)
PH UR: 6.5 — SIGNIFICANT CHANGE UP (ref 5–8)
PHOSPHATE SERPL-MCNC: 2 MG/DL — LOW (ref 2.4–4.7)
PHOSPHATE SERPL-MCNC: 2.1 MG/DL — LOW (ref 2.4–4.7)
PHOSPHATE SERPL-MCNC: 3.1 MG/DL — SIGNIFICANT CHANGE UP (ref 2.4–4.7)
PHOSPHATE SERPL-MCNC: 3.5 MG/DL — SIGNIFICANT CHANGE UP (ref 2.4–4.7)
PLATELET # BLD AUTO: 236 K/UL — SIGNIFICANT CHANGE UP (ref 150–400)
PLATELET # BLD AUTO: 251 K/UL — SIGNIFICANT CHANGE UP (ref 150–400)
PLATELET # BLD AUTO: 253 K/UL — SIGNIFICANT CHANGE UP (ref 150–400)
PLATELET # BLD AUTO: 255 K/UL — SIGNIFICANT CHANGE UP (ref 150–400)
PMV BLD: 9.1 FL — SIGNIFICANT CHANGE UP (ref 7–13)
PMV BLD: 9.2 FL — SIGNIFICANT CHANGE UP (ref 7–13)
PMV BLD: 9.3 FL — SIGNIFICANT CHANGE UP (ref 7–13)
PMV BLD: 9.4 FL — SIGNIFICANT CHANGE UP (ref 7–13)
POTASSIUM SERPL-MCNC: 3.5 MMOL/L — SIGNIFICANT CHANGE UP (ref 3.5–5.3)
POTASSIUM SERPL-MCNC: 3.7 MMOL/L — SIGNIFICANT CHANGE UP (ref 3.5–5.3)
POTASSIUM SERPL-MCNC: 3.7 MMOL/L — SIGNIFICANT CHANGE UP (ref 3.5–5.3)
POTASSIUM SERPL-MCNC: 3.8 MMOL/L — SIGNIFICANT CHANGE UP (ref 3.5–5.3)
POTASSIUM SERPL-SCNC: 3.5 MMOL/L — SIGNIFICANT CHANGE UP (ref 3.5–5.3)
POTASSIUM SERPL-SCNC: 3.7 MMOL/L — SIGNIFICANT CHANGE UP (ref 3.5–5.3)
POTASSIUM SERPL-SCNC: 3.7 MMOL/L — SIGNIFICANT CHANGE UP (ref 3.5–5.3)
POTASSIUM SERPL-SCNC: 3.8 MMOL/L — SIGNIFICANT CHANGE UP (ref 3.5–5.3)
PROT SERPL-MCNC: 5.8 G/DL — LOW (ref 6.6–8.7)
PROT SERPL-MCNC: 5.8 G/DL — LOW (ref 6.6–8.7)
PROT SERPL-MCNC: 6 G/DL — LOW (ref 6.6–8.7)
PROT SERPL-MCNC: 6.2 G/DL — LOW (ref 6.6–8.7)
PROT UR-MCNC: 30 MG/DL
PROTHROM AB SERPL-ACNC: 12.3 SEC — SIGNIFICANT CHANGE UP (ref 9.9–13.4)
PROTHROM AB SERPL-ACNC: 12.6 SEC — SIGNIFICANT CHANGE UP (ref 9.9–13.4)
PROTHROM AB SERPL-ACNC: 12.7 SEC — SIGNIFICANT CHANGE UP (ref 9.9–13.4)
PROTHROM AB SERPL-ACNC: 12.7 SEC — SIGNIFICANT CHANGE UP (ref 9.9–13.4)
RBC # BLD: 3.65 M/UL — LOW (ref 4.2–5.8)
RBC # BLD: 3.67 M/UL — LOW (ref 4.2–5.8)
RBC # BLD: 3.73 M/UL — LOW (ref 4.2–5.8)
RBC # BLD: 3.83 M/UL — LOW (ref 4.2–5.8)
RBC # FLD: 14.7 % — HIGH (ref 10.3–14.5)
RBC # FLD: 14.8 % — HIGH (ref 10.3–14.5)
RBC # FLD: 14.9 % — HIGH (ref 10.3–14.5)
RBC # FLD: 14.9 % — HIGH (ref 10.3–14.5)
RBC CASTS # UR COMP ASSIST: 52 /HPF — HIGH (ref 0–4)
SODIUM SERPL-SCNC: 136 MMOL/L — SIGNIFICANT CHANGE UP (ref 135–145)
SODIUM SERPL-SCNC: 138 MMOL/L — SIGNIFICANT CHANGE UP (ref 135–145)
SP GR SPEC: >1.03 — HIGH (ref 1–1.03)
SQUAMOUS # UR AUTO: 1 /HPF — SIGNIFICANT CHANGE UP (ref 0–5)
TROPONIN T, HIGH SENSITIVITY RESULT: 51 NG/L — SIGNIFICANT CHANGE UP (ref 0–51)
TROPONIN T, HIGH SENSITIVITY RESULT: 70 NG/L — HIGH (ref 0–51)
TROPONIN T, HIGH SENSITIVITY RESULT: 78 NG/L — HIGH (ref 0–51)
TROPONIN T, HIGH SENSITIVITY RESULT: 88 NG/L — HIGH (ref 0–51)
UROBILINOGEN FLD QL: 2 MG/DL (ref 0.2–1)
WBC # BLD: 13.55 K/UL — HIGH (ref 3.8–10.5)
WBC # BLD: 13.59 K/UL — HIGH (ref 3.8–10.5)
WBC # BLD: 13.75 K/UL — HIGH (ref 3.8–10.5)
WBC # BLD: 14.09 K/UL — HIGH (ref 3.8–10.5)
WBC # FLD AUTO: 13.55 K/UL — HIGH (ref 3.8–10.5)
WBC # FLD AUTO: 13.59 K/UL — HIGH (ref 3.8–10.5)
WBC # FLD AUTO: 13.75 K/UL — HIGH (ref 3.8–10.5)
WBC # FLD AUTO: 14.09 K/UL — HIGH (ref 3.8–10.5)
WBC UR QL: 3 /HPF — SIGNIFICANT CHANGE UP (ref 0–5)

## 2025-05-14 PROCEDURE — 93306 TTE W/DOPPLER COMPLETE: CPT | Mod: 26

## 2025-05-14 PROCEDURE — 95718 EEG PHYS/QHP 2-12 HR W/VEEG: CPT

## 2025-05-14 PROCEDURE — 36620 INSERTION CATHETER ARTERY: CPT

## 2025-05-14 PROCEDURE — 99233 SBSQ HOSP IP/OBS HIGH 50: CPT

## 2025-05-14 PROCEDURE — 99232 SBSQ HOSP IP/OBS MODERATE 35: CPT

## 2025-05-14 PROCEDURE — 74176 CT ABD & PELVIS W/O CONTRAST: CPT | Mod: 26

## 2025-05-14 PROCEDURE — 99497 ADVNCD CARE PLAN 30 MIN: CPT | Mod: 25

## 2025-05-14 PROCEDURE — 99233 SBSQ HOSP IP/OBS HIGH 50: CPT | Mod: GC

## 2025-05-14 PROCEDURE — 31624 DX BRONCHOSCOPE/LAVAGE: CPT

## 2025-05-14 PROCEDURE — 71045 X-RAY EXAM CHEST 1 VIEW: CPT | Mod: 26

## 2025-05-14 RX ORDER — MIDAZOLAM IN 0.9 % SOD.CHLORID 1 MG/ML
6 PLASTIC BAG, INJECTION (ML) INTRAVENOUS ONCE
Refills: 0 | Status: DISCONTINUED | OUTPATIENT
Start: 2025-05-14 | End: 2025-05-14

## 2025-05-14 RX ORDER — VANCOMYCIN HCL IN 5 % DEXTROSE 1.5G/250ML
1000 PLASTIC BAG, INJECTION (ML) INTRAVENOUS ONCE
Refills: 0 | Status: COMPLETED | OUTPATIENT
Start: 2025-05-14 | End: 2025-05-14

## 2025-05-14 RX ORDER — SODIUM CHLORIDE 9 G/1000ML
1000 INJECTION, SOLUTION INTRAVENOUS
Refills: 0 | Status: DISCONTINUED | OUTPATIENT
Start: 2025-05-14 | End: 2025-05-17

## 2025-05-14 RX ORDER — FENTANYL CITRATE-0.9 % NACL/PF 100MCG/2ML
100 SYRINGE (ML) INTRAVENOUS ONCE
Refills: 0 | Status: DISCONTINUED | OUTPATIENT
Start: 2025-05-14 | End: 2025-05-14

## 2025-05-14 RX ORDER — FUROSEMIDE 10 MG/ML
40 INJECTION INTRAMUSCULAR; INTRAVENOUS ONCE
Refills: 0 | Status: COMPLETED | OUTPATIENT
Start: 2025-05-14 | End: 2025-05-14

## 2025-05-14 RX ORDER — IPRATROPIUM BROMIDE AND ALBUTEROL SULFATE .5; 2.5 MG/3ML; MG/3ML
3 SOLUTION RESPIRATORY (INHALATION) EVERY 6 HOURS
Refills: 0 | Status: DISCONTINUED | OUTPATIENT
Start: 2025-05-14 | End: 2025-05-16

## 2025-05-14 RX ORDER — DEXTROSE 50 % IN WATER 50 %
25 SYRINGE (ML) INTRAVENOUS ONCE
Refills: 0 | Status: DISCONTINUED | OUTPATIENT
Start: 2025-05-14 | End: 2025-05-17

## 2025-05-14 RX ORDER — LANOLIN/MINERAL OIL/PETROLATUM
2 OINTMENT (GRAM) OPHTHALMIC (EYE)
Refills: 0 | Status: DISCONTINUED | OUTPATIENT
Start: 2025-05-14 | End: 2025-05-17

## 2025-05-14 RX ORDER — INSULIN LISPRO 100 U/ML
INJECTION, SOLUTION INTRAVENOUS; SUBCUTANEOUS EVERY 6 HOURS
Refills: 0 | Status: DISCONTINUED | OUTPATIENT
Start: 2025-05-14 | End: 2025-05-17

## 2025-05-14 RX ORDER — DEXTROSE 50 % IN WATER 50 %
15 SYRINGE (ML) INTRAVENOUS ONCE
Refills: 0 | Status: DISCONTINUED | OUTPATIENT
Start: 2025-05-14 | End: 2025-05-17

## 2025-05-14 RX ORDER — MIDAZOLAM IN 0.9 % SOD.CHLORID 1 MG/ML
4 PLASTIC BAG, INJECTION (ML) INTRAVENOUS ONCE
Refills: 0 | Status: DISCONTINUED | OUTPATIENT
Start: 2025-05-14 | End: 2025-05-14

## 2025-05-14 RX ORDER — DEXTROSE 50 % IN WATER 50 %
12.5 SYRINGE (ML) INTRAVENOUS ONCE
Refills: 0 | Status: DISCONTINUED | OUTPATIENT
Start: 2025-05-14 | End: 2025-05-17

## 2025-05-14 RX ORDER — GLUCAGON 3 MG/1
1 POWDER NASAL ONCE
Refills: 0 | Status: DISCONTINUED | OUTPATIENT
Start: 2025-05-14 | End: 2025-05-17

## 2025-05-14 RX ORDER — FLUCONAZOLE 150 MG
400 TABLET ORAL EVERY 24 HOURS
Refills: 0 | Status: DISCONTINUED | OUTPATIENT
Start: 2025-05-14 | End: 2025-05-17

## 2025-05-14 RX ADMIN — Medication 4 MILLIGRAM(S): at 16:34

## 2025-05-14 RX ADMIN — Medication 2 DROP(S): at 02:02

## 2025-05-14 RX ADMIN — IPRATROPIUM BROMIDE AND ALBUTEROL SULFATE 3 MILLILITER(S): .5; 2.5 SOLUTION RESPIRATORY (INHALATION) at 21:09

## 2025-05-14 RX ADMIN — IPRATROPIUM BROMIDE AND ALBUTEROL SULFATE 3 MILLILITER(S): .5; 2.5 SOLUTION RESPIRATORY (INHALATION) at 08:34

## 2025-05-14 RX ADMIN — Medication 2 DROP(S): at 20:00

## 2025-05-14 RX ADMIN — Medication 2 DROP(S): at 05:45

## 2025-05-14 RX ADMIN — Medication 2 DROP(S): at 04:00

## 2025-05-14 RX ADMIN — Medication 25 GRAM(S): at 17:25

## 2025-05-14 RX ADMIN — Medication 100 MICROGRAM(S): at 16:04

## 2025-05-14 RX ADMIN — IPRATROPIUM BROMIDE AND ALBUTEROL SULFATE 3 MILLILITER(S): .5; 2.5 SOLUTION RESPIRATORY (INHALATION) at 05:06

## 2025-05-14 RX ADMIN — LEVETIRACETAM 1000 MILLIGRAM(S): 10 INJECTION, SOLUTION INTRAVENOUS at 05:33

## 2025-05-14 RX ADMIN — Medication 2 DROP(S): at 17:26

## 2025-05-14 RX ADMIN — Medication 1 APPLICATION(S): at 17:25

## 2025-05-14 RX ADMIN — Medication 100 MILLIGRAM(S): at 02:57

## 2025-05-14 RX ADMIN — Medication 25 GRAM(S): at 01:47

## 2025-05-14 RX ADMIN — Medication 2 DROP(S): at 17:25

## 2025-05-14 RX ADMIN — Medication 250 MILLIGRAM(S): at 01:47

## 2025-05-14 RX ADMIN — Medication 2 DROP(S): at 11:53

## 2025-05-14 RX ADMIN — MUPIROCIN CALCIUM 1 APPLICATION(S): 20 CREAM TOPICAL at 17:29

## 2025-05-14 RX ADMIN — Medication 15 MILLILITER(S): at 17:25

## 2025-05-14 RX ADMIN — Medication 2 DROP(S): at 16:05

## 2025-05-14 RX ADMIN — Medication 1 APPLICATION(S): at 05:33

## 2025-05-14 RX ADMIN — Medication 2 DROP(S): at 08:15

## 2025-05-14 RX ADMIN — MUPIROCIN CALCIUM 1 APPLICATION(S): 20 CREAM TOPICAL at 05:33

## 2025-05-14 RX ADMIN — FUROSEMIDE 40 MILLIGRAM(S): 10 INJECTION INTRAMUSCULAR; INTRAVENOUS at 02:02

## 2025-05-14 RX ADMIN — Medication 2 DROP(S): at 09:15

## 2025-05-14 RX ADMIN — Medication 6 MILLIGRAM(S): at 16:05

## 2025-05-14 RX ADMIN — Medication 15 MILLILITER(S): at 05:33

## 2025-05-14 RX ADMIN — IPRATROPIUM BROMIDE AND ALBUTEROL SULFATE 3 MILLILITER(S): .5; 2.5 SOLUTION RESPIRATORY (INHALATION) at 15:28

## 2025-05-14 RX ADMIN — Medication 25 GRAM(S): at 09:15

## 2025-05-14 RX ADMIN — Medication 40 MILLIGRAM(S): at 11:29

## 2025-05-14 RX ADMIN — ENOXAPARIN SODIUM 40 MILLIGRAM(S): 100 INJECTION SUBCUTANEOUS at 05:33

## 2025-05-14 RX ADMIN — LEVETIRACETAM 1000 MILLIGRAM(S): 10 INJECTION, SOLUTION INTRAVENOUS at 17:25

## 2025-05-14 RX ADMIN — Medication 2 DROP(S): at 22:05

## 2025-05-15 LAB
ALBUMIN SERPL ELPH-MCNC: 3.2 G/DL — LOW (ref 3.3–5.2)
ALBUMIN SERPL ELPH-MCNC: 3.3 G/DL — SIGNIFICANT CHANGE UP (ref 3.3–5.2)
ALBUMIN SERPL ELPH-MCNC: 3.3 G/DL — SIGNIFICANT CHANGE UP (ref 3.3–5.2)
ALBUMIN SERPL ELPH-MCNC: 3.5 G/DL — SIGNIFICANT CHANGE UP (ref 3.3–5.2)
ALP SERPL-CCNC: 107 U/L — SIGNIFICANT CHANGE UP (ref 40–120)
ALP SERPL-CCNC: 109 U/L — SIGNIFICANT CHANGE UP (ref 40–120)
ALP SERPL-CCNC: 111 U/L — SIGNIFICANT CHANGE UP (ref 40–120)
ALP SERPL-CCNC: 96 U/L — SIGNIFICANT CHANGE UP (ref 40–120)
ALT FLD-CCNC: 35 U/L — SIGNIFICANT CHANGE UP
ALT FLD-CCNC: 38 U/L — SIGNIFICANT CHANGE UP
ALT FLD-CCNC: 39 U/L — SIGNIFICANT CHANGE UP
ALT FLD-CCNC: 40 U/L — SIGNIFICANT CHANGE UP
AMYLASE P1 CFR SERPL: 25 U/L — LOW (ref 36–128)
AMYLASE P1 CFR SERPL: 30 U/L — LOW (ref 36–128)
AMYLASE P1 CFR SERPL: 31 U/L — LOW (ref 36–128)
AMYLASE P1 CFR SERPL: 36 U/L — SIGNIFICANT CHANGE UP (ref 36–128)
ANION GAP SERPL CALC-SCNC: 12 MMOL/L — SIGNIFICANT CHANGE UP (ref 5–17)
ANION GAP SERPL CALC-SCNC: 13 MMOL/L — SIGNIFICANT CHANGE UP (ref 5–17)
ANION GAP SERPL CALC-SCNC: 13 MMOL/L — SIGNIFICANT CHANGE UP (ref 5–17)
ANION GAP SERPL CALC-SCNC: 14 MMOL/L — SIGNIFICANT CHANGE UP (ref 5–17)
APPEARANCE UR: CLEAR — SIGNIFICANT CHANGE UP
APTT BLD: 21.6 SEC — LOW (ref 26.1–36.8)
APTT BLD: 25.4 SEC — LOW (ref 26.1–36.8)
APTT BLD: 26.1 SEC — SIGNIFICANT CHANGE UP (ref 26.1–36.8)
AST SERPL-CCNC: 37 U/L — SIGNIFICANT CHANGE UP
AST SERPL-CCNC: 40 U/L — HIGH
AST SERPL-CCNC: 45 U/L — HIGH
AST SERPL-CCNC: 49 U/L — HIGH
BACTERIA # UR AUTO: NEGATIVE /HPF — SIGNIFICANT CHANGE UP
BASOPHILS # BLD AUTO: 0.05 K/UL — SIGNIFICANT CHANGE UP (ref 0–0.2)
BASOPHILS # BLD AUTO: 0.07 K/UL — SIGNIFICANT CHANGE UP (ref 0–0.2)
BASOPHILS NFR BLD AUTO: 0.4 % — SIGNIFICANT CHANGE UP (ref 0–2)
BASOPHILS NFR BLD AUTO: 0.5 % — SIGNIFICANT CHANGE UP (ref 0–2)
BILIRUB DIRECT SERPL-MCNC: 0.2 MG/DL — SIGNIFICANT CHANGE UP (ref 0–0.3)
BILIRUB INDIRECT FLD-MCNC: 0.4 MG/DL — SIGNIFICANT CHANGE UP (ref 0.2–1)
BILIRUB INDIRECT FLD-MCNC: 0.4 MG/DL — SIGNIFICANT CHANGE UP (ref 0.2–1)
BILIRUB INDIRECT FLD-MCNC: 0.5 MG/DL — SIGNIFICANT CHANGE UP (ref 0.2–1)
BILIRUB SERPL-MCNC: 0.5 MG/DL — SIGNIFICANT CHANGE UP (ref 0.4–2)
BILIRUB SERPL-MCNC: 0.6 MG/DL — SIGNIFICANT CHANGE UP (ref 0.4–2)
BILIRUB SERPL-MCNC: 0.7 MG/DL — SIGNIFICANT CHANGE UP (ref 0.4–2)
BILIRUB SERPL-MCNC: 0.7 MG/DL — SIGNIFICANT CHANGE UP (ref 0.4–2)
BILIRUB UR-MCNC: NEGATIVE — SIGNIFICANT CHANGE UP
BUN SERPL-MCNC: 21.9 MG/DL — HIGH (ref 8–20)
BUN SERPL-MCNC: 22.2 MG/DL — HIGH (ref 8–20)
BUN SERPL-MCNC: 22.2 MG/DL — HIGH (ref 8–20)
BUN SERPL-MCNC: 22.6 MG/DL — HIGH (ref 8–20)
CALCIUM SERPL-MCNC: 8.4 MG/DL — SIGNIFICANT CHANGE UP (ref 8.4–10.5)
CALCIUM SERPL-MCNC: 8.5 MG/DL — SIGNIFICANT CHANGE UP (ref 8.4–10.5)
CALCIUM SERPL-MCNC: 8.9 MG/DL — SIGNIFICANT CHANGE UP (ref 8.4–10.5)
CALCIUM SERPL-MCNC: 9.3 MG/DL — SIGNIFICANT CHANGE UP (ref 8.4–10.5)
CALCOFLUOR WHITE SPEC: ABNORMAL
CAST: 0 /LPF — SIGNIFICANT CHANGE UP (ref 0–4)
CHLORIDE SERPL-SCNC: 101 MMOL/L — SIGNIFICANT CHANGE UP (ref 96–108)
CHLORIDE SERPL-SCNC: 101 MMOL/L — SIGNIFICANT CHANGE UP (ref 96–108)
CHLORIDE SERPL-SCNC: 99 MMOL/L — SIGNIFICANT CHANGE UP (ref 96–108)
CHLORIDE SERPL-SCNC: 99 MMOL/L — SIGNIFICANT CHANGE UP (ref 96–108)
CK MB CFR SERPL CALC: 1.1 NG/ML — SIGNIFICANT CHANGE UP (ref 0–6.7)
CK MB CFR SERPL CALC: 1.3 NG/ML — SIGNIFICANT CHANGE UP (ref 0–6.7)
CK MB CFR SERPL CALC: 1.4 NG/ML — SIGNIFICANT CHANGE UP (ref 0–6.7)
CK SERPL-CCNC: 130 U/L — SIGNIFICANT CHANGE UP (ref 30–200)
CK SERPL-CCNC: 174 U/L — SIGNIFICANT CHANGE UP (ref 30–200)
CK SERPL-CCNC: 219 U/L — HIGH (ref 30–200)
CK SERPL-CCNC: 249 U/L — HIGH (ref 30–200)
CO2 SERPL-SCNC: 23 MMOL/L — SIGNIFICANT CHANGE UP (ref 22–29)
CO2 SERPL-SCNC: 24 MMOL/L — SIGNIFICANT CHANGE UP (ref 22–29)
COLOR SPEC: SIGNIFICANT CHANGE UP
CREAT SERPL-MCNC: 0.45 MG/DL — LOW (ref 0.5–1.3)
CREAT SERPL-MCNC: 0.48 MG/DL — LOW (ref 0.5–1.3)
CREAT SERPL-MCNC: 0.54 MG/DL — SIGNIFICANT CHANGE UP (ref 0.5–1.3)
CREAT SERPL-MCNC: 0.55 MG/DL — SIGNIFICANT CHANGE UP (ref 0.5–1.3)
DIFF PNL FLD: ABNORMAL
EGFR: 128 ML/MIN/1.73M2 — SIGNIFICANT CHANGE UP
EGFR: 133 ML/MIN/1.73M2 — SIGNIFICANT CHANGE UP
EGFR: 133 ML/MIN/1.73M2 — SIGNIFICANT CHANGE UP
EGFR: 136 ML/MIN/1.73M2 — SIGNIFICANT CHANGE UP
EGFR: 136 ML/MIN/1.73M2 — SIGNIFICANT CHANGE UP
EOSINOPHIL # BLD AUTO: 0.07 K/UL — SIGNIFICANT CHANGE UP (ref 0–0.5)
EOSINOPHIL # BLD AUTO: 0.08 K/UL — SIGNIFICANT CHANGE UP (ref 0–0.5)
EOSINOPHIL # BLD AUTO: 0.11 K/UL — SIGNIFICANT CHANGE UP (ref 0–0.5)
EOSINOPHIL # BLD AUTO: 0.13 K/UL — SIGNIFICANT CHANGE UP (ref 0–0.5)
EOSINOPHIL NFR BLD AUTO: 0.6 % — SIGNIFICANT CHANGE UP (ref 0–6)
EOSINOPHIL NFR BLD AUTO: 0.7 % — SIGNIFICANT CHANGE UP (ref 0–6)
EOSINOPHIL NFR BLD AUTO: 0.9 % — SIGNIFICANT CHANGE UP (ref 0–6)
EOSINOPHIL NFR BLD AUTO: 1 % — SIGNIFICANT CHANGE UP (ref 0–6)
GAS PNL BLDA: SIGNIFICANT CHANGE UP
GLUCOSE BLDC GLUCOMTR-MCNC: 104 MG/DL — HIGH (ref 70–99)
GLUCOSE BLDC GLUCOMTR-MCNC: 109 MG/DL — HIGH (ref 70–99)
GLUCOSE SERPL-MCNC: 105 MG/DL — HIGH (ref 70–99)
GLUCOSE SERPL-MCNC: 110 MG/DL — HIGH (ref 70–99)
GLUCOSE SERPL-MCNC: 114 MG/DL — HIGH (ref 70–99)
GLUCOSE SERPL-MCNC: 118 MG/DL — HIGH (ref 70–99)
GLUCOSE UR QL: NEGATIVE MG/DL — SIGNIFICANT CHANGE UP
GRAM STN FLD: ABNORMAL
HCT VFR BLD CALC: 33.4 % — LOW (ref 39–50)
HCT VFR BLD CALC: 33.6 % — LOW (ref 39–50)
HCT VFR BLD CALC: 34.1 % — LOW (ref 39–50)
HCT VFR BLD CALC: 34.5 % — LOW (ref 39–50)
HGB BLD-MCNC: 11.3 G/DL — LOW (ref 13–17)
HGB BLD-MCNC: 11.3 G/DL — LOW (ref 13–17)
HGB BLD-MCNC: 11.6 G/DL — LOW (ref 13–17)
HGB BLD-MCNC: 11.7 G/DL — LOW (ref 13–17)
IMM GRANULOCYTES # BLD AUTO: 0.31 K/UL — HIGH (ref 0–0.07)
IMM GRANULOCYTES # BLD AUTO: 0.42 K/UL — HIGH (ref 0–0.07)
IMM GRANULOCYTES # BLD AUTO: 0.46 K/UL — HIGH (ref 0–0.07)
IMM GRANULOCYTES # BLD AUTO: 0.48 K/UL — HIGH (ref 0–0.07)
IMM GRANULOCYTES NFR BLD AUTO: 2.5 % — HIGH (ref 0–0.9)
IMM GRANULOCYTES NFR BLD AUTO: 3.3 % — HIGH (ref 0–0.9)
IMM GRANULOCYTES NFR BLD AUTO: 3.8 % — HIGH (ref 0–0.9)
IMM GRANULOCYTES NFR BLD AUTO: 4.1 % — HIGH (ref 0–0.9)
INR BLD: 1.05 RATIO — SIGNIFICANT CHANGE UP (ref 0.85–1.16)
INR BLD: 1.06 RATIO — SIGNIFICANT CHANGE UP (ref 0.85–1.16)
INR BLD: 1.08 RATIO — SIGNIFICANT CHANGE UP (ref 0.85–1.16)
INR BLD: 1.09 RATIO — SIGNIFICANT CHANGE UP (ref 0.85–1.16)
KETONES UR QL: ABNORMAL MG/DL
LEUKOCYTE ESTERASE UR-ACNC: NEGATIVE — SIGNIFICANT CHANGE UP
LIDOCAIN IGE QN: 22 U/L — SIGNIFICANT CHANGE UP (ref 22–51)
LIDOCAIN IGE QN: 27 U/L — SIGNIFICANT CHANGE UP (ref 22–51)
LIDOCAIN IGE QN: 27 U/L — SIGNIFICANT CHANGE UP (ref 22–51)
LIDOCAIN IGE QN: 33 U/L — SIGNIFICANT CHANGE UP (ref 22–51)
LYMPHOCYTES # BLD AUTO: 0.83 K/UL — LOW (ref 1–3.3)
LYMPHOCYTES # BLD AUTO: 0.85 K/UL — LOW (ref 1–3.3)
LYMPHOCYTES # BLD AUTO: 0.93 K/UL — LOW (ref 1–3.3)
LYMPHOCYTES # BLD AUTO: 1.03 K/UL — SIGNIFICANT CHANGE UP (ref 1–3.3)
LYMPHOCYTES NFR BLD AUTO: 6.6 % — LOW (ref 13–44)
LYMPHOCYTES NFR BLD AUTO: 7 % — LOW (ref 13–44)
LYMPHOCYTES NFR BLD AUTO: 7.2 % — LOW (ref 13–44)
LYMPHOCYTES NFR BLD AUTO: 8.8 % — LOW (ref 13–44)
MAGNESIUM SERPL-MCNC: 1.9 MG/DL — SIGNIFICANT CHANGE UP (ref 1.6–2.6)
MAGNESIUM SERPL-MCNC: 2 MG/DL — SIGNIFICANT CHANGE UP (ref 1.6–2.6)
MAGNESIUM SERPL-MCNC: 2.1 MG/DL — SIGNIFICANT CHANGE UP (ref 1.6–2.6)
MAGNESIUM SERPL-MCNC: 2.1 MG/DL — SIGNIFICANT CHANGE UP (ref 1.6–2.6)
MCHC RBC-ENTMCNC: 31.1 PG — SIGNIFICANT CHANGE UP (ref 27–34)
MCHC RBC-ENTMCNC: 31.2 PG — SIGNIFICANT CHANGE UP (ref 27–34)
MCHC RBC-ENTMCNC: 31.3 PG — SIGNIFICANT CHANGE UP (ref 27–34)
MCHC RBC-ENTMCNC: 31.5 PG — SIGNIFICANT CHANGE UP (ref 27–34)
MCHC RBC-ENTMCNC: 33.6 G/DL — SIGNIFICANT CHANGE UP (ref 32–36)
MCHC RBC-ENTMCNC: 33.6 G/DL — SIGNIFICANT CHANGE UP (ref 32–36)
MCHC RBC-ENTMCNC: 33.8 G/DL — SIGNIFICANT CHANGE UP (ref 32–36)
MCHC RBC-ENTMCNC: 34.3 G/DL — SIGNIFICANT CHANGE UP (ref 32–36)
MCV RBC AUTO: 91.9 FL — SIGNIFICANT CHANGE UP (ref 80–100)
MCV RBC AUTO: 92.5 FL — SIGNIFICANT CHANGE UP (ref 80–100)
MCV RBC AUTO: 92.5 FL — SIGNIFICANT CHANGE UP (ref 80–100)
MCV RBC AUTO: 92.8 FL — SIGNIFICANT CHANGE UP (ref 80–100)
MONOCYTES # BLD AUTO: 0.91 K/UL — HIGH (ref 0–0.9)
MONOCYTES # BLD AUTO: 0.96 K/UL — HIGH (ref 0–0.9)
MONOCYTES # BLD AUTO: 0.96 K/UL — HIGH (ref 0–0.9)
MONOCYTES # BLD AUTO: 0.98 K/UL — HIGH (ref 0–0.9)
MONOCYTES NFR BLD AUTO: 7.2 % — SIGNIFICANT CHANGE UP (ref 2–14)
MONOCYTES NFR BLD AUTO: 7.4 % — SIGNIFICANT CHANGE UP (ref 2–14)
MONOCYTES NFR BLD AUTO: 7.9 % — SIGNIFICANT CHANGE UP (ref 2–14)
MONOCYTES NFR BLD AUTO: 8.3 % — SIGNIFICANT CHANGE UP (ref 2–14)
NEUTROPHILS # BLD AUTO: 10.4 K/UL — HIGH (ref 1.8–7.4)
NEUTROPHILS # BLD AUTO: 10.43 K/UL — HIGH (ref 1.8–7.4)
NEUTROPHILS # BLD AUTO: 9.15 K/UL — HIGH (ref 1.8–7.4)
NEUTROPHILS # BLD AUTO: 9.75 K/UL — HIGH (ref 1.8–7.4)
NEUTROPHILS NFR BLD AUTO: 77.7 % — HIGH (ref 43–77)
NEUTROPHILS NFR BLD AUTO: 80.3 % — HIGH (ref 43–77)
NEUTROPHILS NFR BLD AUTO: 80.7 % — HIGH (ref 43–77)
NEUTROPHILS NFR BLD AUTO: 82.3 % — HIGH (ref 43–77)
NIGHT BLUE STAIN TISS: SIGNIFICANT CHANGE UP
NITRITE UR-MCNC: NEGATIVE — SIGNIFICANT CHANGE UP
NRBC # BLD AUTO: 0 K/UL — SIGNIFICANT CHANGE UP (ref 0–0)
NRBC # FLD: 0 K/UL — SIGNIFICANT CHANGE UP (ref 0–0)
NRBC BLD AUTO-RTO: 0 /100 WBCS — SIGNIFICANT CHANGE UP (ref 0–0)
PH UR: 6.5 — SIGNIFICANT CHANGE UP (ref 5–8)
PHOSPHATE SERPL-MCNC: 3.2 MG/DL — SIGNIFICANT CHANGE UP (ref 2.4–4.7)
PHOSPHATE SERPL-MCNC: 3.4 MG/DL — SIGNIFICANT CHANGE UP (ref 2.4–4.7)
PHOSPHATE SERPL-MCNC: 3.4 MG/DL — SIGNIFICANT CHANGE UP (ref 2.4–4.7)
PHOSPHATE SERPL-MCNC: 3.6 MG/DL — SIGNIFICANT CHANGE UP (ref 2.4–4.7)
PLATELET # BLD AUTO: 242 K/UL — SIGNIFICANT CHANGE UP (ref 150–400)
PLATELET # BLD AUTO: 255 K/UL — SIGNIFICANT CHANGE UP (ref 150–400)
PLATELET # BLD AUTO: 258 K/UL — SIGNIFICANT CHANGE UP (ref 150–400)
PLATELET # BLD AUTO: 273 K/UL — SIGNIFICANT CHANGE UP (ref 150–400)
PMV BLD: 8.8 FL — SIGNIFICANT CHANGE UP (ref 7–13)
PMV BLD: 9.1 FL — SIGNIFICANT CHANGE UP (ref 7–13)
PMV BLD: 9.2 FL — SIGNIFICANT CHANGE UP (ref 7–13)
PMV BLD: 9.3 FL — SIGNIFICANT CHANGE UP (ref 7–13)
POTASSIUM SERPL-MCNC: 3.9 MMOL/L — SIGNIFICANT CHANGE UP (ref 3.5–5.3)
POTASSIUM SERPL-MCNC: 4.1 MMOL/L — SIGNIFICANT CHANGE UP (ref 3.5–5.3)
POTASSIUM SERPL-SCNC: 3.9 MMOL/L — SIGNIFICANT CHANGE UP (ref 3.5–5.3)
POTASSIUM SERPL-SCNC: 4.1 MMOL/L — SIGNIFICANT CHANGE UP (ref 3.5–5.3)
PROT SERPL-MCNC: 5.8 G/DL — LOW (ref 6.6–8.7)
PROT SERPL-MCNC: 6.3 G/DL — LOW (ref 6.6–8.7)
PROT SERPL-MCNC: 6.3 G/DL — LOW (ref 6.6–8.7)
PROT SERPL-MCNC: 6.4 G/DL — LOW (ref 6.6–8.7)
PROT UR-MCNC: 30 MG/DL
PROTHROM AB SERPL-ACNC: 12.2 SEC — SIGNIFICANT CHANGE UP (ref 9.9–13.4)
PROTHROM AB SERPL-ACNC: 12.3 SEC — SIGNIFICANT CHANGE UP (ref 9.9–13.4)
PROTHROM AB SERPL-ACNC: 12.5 SEC — SIGNIFICANT CHANGE UP (ref 9.9–13.4)
PROTHROM AB SERPL-ACNC: 12.6 SEC — SIGNIFICANT CHANGE UP (ref 9.9–13.4)
RBC # BLD: 3.61 M/UL — LOW (ref 4.2–5.8)
RBC # BLD: 3.62 M/UL — LOW (ref 4.2–5.8)
RBC # BLD: 3.71 M/UL — LOW (ref 4.2–5.8)
RBC # BLD: 3.73 M/UL — LOW (ref 4.2–5.8)
RBC # FLD: 14.6 % — HIGH (ref 10.3–14.5)
RBC # FLD: 14.7 % — HIGH (ref 10.3–14.5)
RBC # FLD: 14.7 % — HIGH (ref 10.3–14.5)
RBC # FLD: 14.8 % — HIGH (ref 10.3–14.5)
RBC CASTS # UR COMP ASSIST: 7 /HPF — HIGH (ref 0–4)
SODIUM SERPL-SCNC: 135 MMOL/L — SIGNIFICANT CHANGE UP (ref 135–145)
SODIUM SERPL-SCNC: 135 MMOL/L — SIGNIFICANT CHANGE UP (ref 135–145)
SODIUM SERPL-SCNC: 137 MMOL/L — SIGNIFICANT CHANGE UP (ref 135–145)
SODIUM SERPL-SCNC: 138 MMOL/L — SIGNIFICANT CHANGE UP (ref 135–145)
SP GR SPEC: >1.03 — HIGH (ref 1–1.03)
SPECIMEN SOURCE: SIGNIFICANT CHANGE UP
SQUAMOUS # UR AUTO: 0 /HPF — SIGNIFICANT CHANGE UP (ref 0–5)
TROPONIN T, HIGH SENSITIVITY RESULT: 15 NG/L — SIGNIFICANT CHANGE UP (ref 0–51)
TROPONIN T, HIGH SENSITIVITY RESULT: 20 NG/L — SIGNIFICANT CHANGE UP (ref 0–51)
TROPONIN T, HIGH SENSITIVITY RESULT: 28 NG/L — SIGNIFICANT CHANGE UP (ref 0–51)
TROPONIN T, HIGH SENSITIVITY RESULT: 40 NG/L — SIGNIFICANT CHANGE UP (ref 0–51)
UROBILINOGEN FLD QL: 4 MG/DL (ref 0.2–1)
WBC # BLD: 11.77 K/UL — HIGH (ref 3.8–10.5)
WBC # BLD: 12.14 K/UL — HIGH (ref 3.8–10.5)
WBC # BLD: 12.63 K/UL — HIGH (ref 3.8–10.5)
WBC # BLD: 12.92 K/UL — HIGH (ref 3.8–10.5)
WBC # FLD AUTO: 11.77 K/UL — HIGH (ref 3.8–10.5)
WBC # FLD AUTO: 12.14 K/UL — HIGH (ref 3.8–10.5)
WBC # FLD AUTO: 12.63 K/UL — HIGH (ref 3.8–10.5)
WBC # FLD AUTO: 12.92 K/UL — HIGH (ref 3.8–10.5)
WBC UR QL: 0 /HPF — SIGNIFICANT CHANGE UP (ref 0–5)

## 2025-05-15 PROCEDURE — 93010 ELECTROCARDIOGRAM REPORT: CPT

## 2025-05-15 PROCEDURE — 99233 SBSQ HOSP IP/OBS HIGH 50: CPT | Mod: GC

## 2025-05-15 PROCEDURE — 99232 SBSQ HOSP IP/OBS MODERATE 35: CPT

## 2025-05-15 PROCEDURE — 93460 R&L HRT ART/VENTRICLE ANGIO: CPT | Mod: 26

## 2025-05-15 PROCEDURE — 99232 SBSQ HOSP IP/OBS MODERATE 35: CPT | Mod: 57

## 2025-05-15 PROCEDURE — 71250 CT THORAX DX C-: CPT | Mod: 26

## 2025-05-15 RX ORDER — ACETAMINOPHEN 500 MG/5ML
1000 LIQUID (ML) ORAL ONCE
Refills: 0 | Status: COMPLETED | OUTPATIENT
Start: 2025-05-15 | End: 2025-05-15

## 2025-05-15 RX ORDER — HYDROMORPHONE/SOD CHLOR,ISO/PF 2 MG/10 ML
0.5 SYRINGE (ML) INJECTION
Qty: 100 | Refills: 0 | Status: DISCONTINUED | OUTPATIENT
Start: 2025-05-15 | End: 2025-05-17

## 2025-05-15 RX ADMIN — Medication 2 DROP(S): at 11:25

## 2025-05-15 RX ADMIN — Medication 0.5 MG/HR: at 14:11

## 2025-05-15 RX ADMIN — IPRATROPIUM BROMIDE AND ALBUTEROL SULFATE 3 MILLILITER(S): .5; 2.5 SOLUTION RESPIRATORY (INHALATION) at 07:59

## 2025-05-15 RX ADMIN — Medication 1000 MILLIGRAM(S): at 13:20

## 2025-05-15 RX ADMIN — ENOXAPARIN SODIUM 40 MILLIGRAM(S): 100 INJECTION SUBCUTANEOUS at 05:45

## 2025-05-15 RX ADMIN — LEVETIRACETAM 1000 MILLIGRAM(S): 10 INJECTION, SOLUTION INTRAVENOUS at 17:36

## 2025-05-15 RX ADMIN — Medication 2 DROP(S): at 04:00

## 2025-05-15 RX ADMIN — Medication 25 GRAM(S): at 10:02

## 2025-05-15 RX ADMIN — Medication 1 APPLICATION(S): at 05:45

## 2025-05-15 RX ADMIN — Medication 2 DROP(S): at 20:29

## 2025-05-15 RX ADMIN — Medication 2 DROP(S): at 01:27

## 2025-05-15 RX ADMIN — Medication 15 MILLILITER(S): at 17:36

## 2025-05-15 RX ADMIN — Medication 2 DROP(S): at 06:00

## 2025-05-15 RX ADMIN — Medication 2 DROP(S): at 07:49

## 2025-05-15 RX ADMIN — Medication 15 MILLILITER(S): at 05:46

## 2025-05-15 RX ADMIN — Medication 2 DROP(S): at 17:36

## 2025-05-15 RX ADMIN — Medication 2 DROP(S): at 22:30

## 2025-05-15 RX ADMIN — Medication 2 DROP(S): at 14:07

## 2025-05-15 RX ADMIN — Medication 25 GRAM(S): at 17:36

## 2025-05-15 RX ADMIN — Medication 400 MILLIGRAM(S): at 13:00

## 2025-05-15 RX ADMIN — Medication 25 GRAM(S): at 01:27

## 2025-05-15 RX ADMIN — IPRATROPIUM BROMIDE AND ALBUTEROL SULFATE 3 MILLILITER(S): .5; 2.5 SOLUTION RESPIRATORY (INHALATION) at 20:50

## 2025-05-15 RX ADMIN — IPRATROPIUM BROMIDE AND ALBUTEROL SULFATE 3 MILLILITER(S): .5; 2.5 SOLUTION RESPIRATORY (INHALATION) at 14:24

## 2025-05-15 RX ADMIN — Medication 2 DROP(S): at 16:16

## 2025-05-15 RX ADMIN — Medication 40 MILLIGRAM(S): at 11:24

## 2025-05-15 RX ADMIN — MUPIROCIN CALCIUM 1 APPLICATION(S): 20 CREAM TOPICAL at 17:36

## 2025-05-15 RX ADMIN — Medication 1 APPLICATION(S): at 17:38

## 2025-05-15 RX ADMIN — Medication 100 MILLIGRAM(S): at 02:42

## 2025-05-15 RX ADMIN — Medication 2 DROP(S): at 00:01

## 2025-05-15 RX ADMIN — LEVETIRACETAM 1000 MILLIGRAM(S): 10 INJECTION, SOLUTION INTRAVENOUS at 05:45

## 2025-05-15 RX ADMIN — MUPIROCIN CALCIUM 1 APPLICATION(S): 20 CREAM TOPICAL at 05:45

## 2025-05-15 RX ADMIN — IPRATROPIUM BROMIDE AND ALBUTEROL SULFATE 3 MILLILITER(S): .5; 2.5 SOLUTION RESPIRATORY (INHALATION) at 04:18

## 2025-05-15 RX ADMIN — Medication 2 DROP(S): at 10:02

## 2025-05-16 LAB
-  AMIKACIN: SIGNIFICANT CHANGE UP
-  AMPICILLIN/SULBACTAM: SIGNIFICANT CHANGE UP
-  CEFEPIME: SIGNIFICANT CHANGE UP
-  CEFTAZIDIME: SIGNIFICANT CHANGE UP
-  CIPROFLOXACIN: SIGNIFICANT CHANGE UP
-  GENTAMICIN: SIGNIFICANT CHANGE UP
-  IMIPENEM: SIGNIFICANT CHANGE UP
-  LEVOFLOXACIN: SIGNIFICANT CHANGE UP
-  MEROPENEM: SIGNIFICANT CHANGE UP
-  TOBRAMYCIN: SIGNIFICANT CHANGE UP
-  TRIMETHOPRIM/SULFAMETHOXAZOLE: SIGNIFICANT CHANGE UP
ALBUMIN SERPL ELPH-MCNC: 3.3 G/DL — SIGNIFICANT CHANGE UP (ref 3.3–5.2)
ALP SERPL-CCNC: 103 U/L — SIGNIFICANT CHANGE UP (ref 40–120)
ALP SERPL-CCNC: 109 U/L — SIGNIFICANT CHANGE UP (ref 40–120)
ALP SERPL-CCNC: 110 U/L — SIGNIFICANT CHANGE UP (ref 40–120)
ALP SERPL-CCNC: 117 U/L — SIGNIFICANT CHANGE UP (ref 40–120)
ALT FLD-CCNC: 36 U/L — SIGNIFICANT CHANGE UP
ALT FLD-CCNC: 40 U/L — SIGNIFICANT CHANGE UP
ALT FLD-CCNC: 42 U/L — HIGH
ALT FLD-CCNC: 46 U/L — HIGH
AMYLASE P1 CFR SERPL: 45 U/L — SIGNIFICANT CHANGE UP (ref 36–128)
AMYLASE P1 CFR SERPL: 48 U/L — SIGNIFICANT CHANGE UP (ref 36–128)
AMYLASE P1 CFR SERPL: 52 U/L — SIGNIFICANT CHANGE UP (ref 36–128)
AMYLASE P1 CFR SERPL: 52 U/L — SIGNIFICANT CHANGE UP (ref 36–128)
ANION GAP SERPL CALC-SCNC: 13 MMOL/L — SIGNIFICANT CHANGE UP (ref 5–17)
ANION GAP SERPL CALC-SCNC: 14 MMOL/L — SIGNIFICANT CHANGE UP (ref 5–17)
APPEARANCE UR: CLEAR — SIGNIFICANT CHANGE UP
APTT BLD: 23.8 SEC — LOW (ref 26.1–36.8)
APTT BLD: 24.4 SEC — LOW (ref 26.1–36.8)
APTT BLD: 25.3 SEC — LOW (ref 26.1–36.8)
APTT BLD: 26.7 SEC — SIGNIFICANT CHANGE UP (ref 26.1–36.8)
AST SERPL-CCNC: 38 U/L — SIGNIFICANT CHANGE UP
AST SERPL-CCNC: 41 U/L — HIGH
AST SERPL-CCNC: 41 U/L — HIGH
AST SERPL-CCNC: 42 U/L — HIGH
BASOPHILS # BLD AUTO: 0.05 K/UL — SIGNIFICANT CHANGE UP (ref 0–0.2)
BASOPHILS # BLD AUTO: 0.06 K/UL — SIGNIFICANT CHANGE UP (ref 0–0.2)
BASOPHILS # BLD AUTO: 0.06 K/UL — SIGNIFICANT CHANGE UP (ref 0–0.2)
BASOPHILS # BLD AUTO: 0.1 K/UL — SIGNIFICANT CHANGE UP (ref 0–0.2)
BASOPHILS # BLD MANUAL: 0 K/UL — SIGNIFICANT CHANGE UP (ref 0–0.2)
BASOPHILS NFR BLD AUTO: 0.4 % — SIGNIFICANT CHANGE UP (ref 0–2)
BASOPHILS NFR BLD AUTO: 0.4 % — SIGNIFICANT CHANGE UP (ref 0–2)
BASOPHILS NFR BLD AUTO: 0.5 % — SIGNIFICANT CHANGE UP (ref 0–2)
BASOPHILS NFR BLD AUTO: 0.7 % — SIGNIFICANT CHANGE UP (ref 0–2)
BASOPHILS NFR BLD MANUAL: 0 % — SIGNIFICANT CHANGE UP (ref 0–2)
BILIRUB DIRECT SERPL-MCNC: 0.2 MG/DL — SIGNIFICANT CHANGE UP (ref 0–0.3)
BILIRUB DIRECT SERPL-MCNC: 0.3 MG/DL — SIGNIFICANT CHANGE UP (ref 0–0.3)
BILIRUB DIRECT SERPL-MCNC: 0.3 MG/DL — SIGNIFICANT CHANGE UP (ref 0–0.3)
BILIRUB DIRECT SERPL-MCNC: 0.4 MG/DL — HIGH (ref 0–0.3)
BILIRUB INDIRECT FLD-MCNC: 0.4 MG/DL — SIGNIFICANT CHANGE UP (ref 0.2–1)
BILIRUB INDIRECT FLD-MCNC: 0.5 MG/DL — SIGNIFICANT CHANGE UP (ref 0.2–1)
BILIRUB SERPL-MCNC: 0.6 MG/DL — SIGNIFICANT CHANGE UP (ref 0.4–2)
BILIRUB SERPL-MCNC: 0.8 MG/DL — SIGNIFICANT CHANGE UP (ref 0.4–2)
BILIRUB SERPL-MCNC: 0.8 MG/DL — SIGNIFICANT CHANGE UP (ref 0.4–2)
BILIRUB SERPL-MCNC: 0.9 MG/DL — SIGNIFICANT CHANGE UP (ref 0.4–2)
BILIRUB UR-MCNC: ABNORMAL
BUN SERPL-MCNC: 20.3 MG/DL — HIGH (ref 8–20)
BUN SERPL-MCNC: 20.4 MG/DL — HIGH (ref 8–20)
BUN SERPL-MCNC: 20.7 MG/DL — HIGH (ref 8–20)
BUN SERPL-MCNC: 20.7 MG/DL — HIGH (ref 8–20)
CALCIUM SERPL-MCNC: 8.6 MG/DL — SIGNIFICANT CHANGE UP (ref 8.4–10.5)
CALCIUM SERPL-MCNC: 8.8 MG/DL — SIGNIFICANT CHANGE UP (ref 8.4–10.5)
CALCIUM SERPL-MCNC: 8.9 MG/DL — SIGNIFICANT CHANGE UP (ref 8.4–10.5)
CALCIUM SERPL-MCNC: 8.9 MG/DL — SIGNIFICANT CHANGE UP (ref 8.4–10.5)
CHLORIDE SERPL-SCNC: 100 MMOL/L — SIGNIFICANT CHANGE UP (ref 96–108)
CHLORIDE SERPL-SCNC: 98 MMOL/L — SIGNIFICANT CHANGE UP (ref 96–108)
CHLORIDE SERPL-SCNC: 98 MMOL/L — SIGNIFICANT CHANGE UP (ref 96–108)
CHLORIDE SERPL-SCNC: 99 MMOL/L — SIGNIFICANT CHANGE UP (ref 96–108)
CK SERPL-CCNC: 111 U/L — SIGNIFICANT CHANGE UP (ref 30–200)
CK SERPL-CCNC: 78 U/L — SIGNIFICANT CHANGE UP (ref 30–200)
CK SERPL-CCNC: 88 U/L — SIGNIFICANT CHANGE UP (ref 30–200)
CK SERPL-CCNC: 99 U/L — SIGNIFICANT CHANGE UP (ref 30–200)
CO2 SERPL-SCNC: 22 MMOL/L — SIGNIFICANT CHANGE UP (ref 22–29)
CO2 SERPL-SCNC: 23 MMOL/L — SIGNIFICANT CHANGE UP (ref 22–29)
CO2 SERPL-SCNC: 23 MMOL/L — SIGNIFICANT CHANGE UP (ref 22–29)
CO2 SERPL-SCNC: 24 MMOL/L — SIGNIFICANT CHANGE UP (ref 22–29)
COLOR SPEC: SIGNIFICANT CHANGE UP
CREAT SERPL-MCNC: 0.42 MG/DL — LOW (ref 0.5–1.3)
CREAT SERPL-MCNC: 0.42 MG/DL — LOW (ref 0.5–1.3)
CREAT SERPL-MCNC: 0.46 MG/DL — LOW (ref 0.5–1.3)
CREAT SERPL-MCNC: 0.48 MG/DL — LOW (ref 0.5–1.3)
CULTURE RESULTS: SIGNIFICANT CHANGE UP
CULTURE RESULTS: SIGNIFICANT CHANGE UP
DIFF PNL FLD: NEGATIVE — SIGNIFICANT CHANGE UP
EGFR: 133 ML/MIN/1.73M2 — SIGNIFICANT CHANGE UP
EGFR: 133 ML/MIN/1.73M2 — SIGNIFICANT CHANGE UP
EGFR: 135 ML/MIN/1.73M2 — SIGNIFICANT CHANGE UP
EGFR: 135 ML/MIN/1.73M2 — SIGNIFICANT CHANGE UP
EGFR: 139 ML/MIN/1.73M2 — SIGNIFICANT CHANGE UP
EOSINOPHIL # BLD AUTO: 0.05 K/UL — SIGNIFICANT CHANGE UP (ref 0–0.5)
EOSINOPHIL # BLD AUTO: 0.08 K/UL — SIGNIFICANT CHANGE UP (ref 0–0.5)
EOSINOPHIL # BLD AUTO: 0.12 K/UL — SIGNIFICANT CHANGE UP (ref 0–0.5)
EOSINOPHIL # BLD AUTO: 0.15 K/UL — SIGNIFICANT CHANGE UP (ref 0–0.5)
EOSINOPHIL # BLD MANUAL: 0 K/UL — SIGNIFICANT CHANGE UP (ref 0–0.5)
EOSINOPHIL NFR BLD AUTO: 0.4 % — SIGNIFICANT CHANGE UP (ref 0–6)
EOSINOPHIL NFR BLD AUTO: 0.6 % — SIGNIFICANT CHANGE UP (ref 0–6)
EOSINOPHIL NFR BLD AUTO: 0.8 % — SIGNIFICANT CHANGE UP (ref 0–6)
EOSINOPHIL NFR BLD AUTO: 1.2 % — SIGNIFICANT CHANGE UP (ref 0–6)
EOSINOPHIL NFR BLD MANUAL: 0 % — SIGNIFICANT CHANGE UP (ref 0–6)
GAS PNL BLDA: SIGNIFICANT CHANGE UP
GLUCOSE SERPL-MCNC: 103 MG/DL — HIGH (ref 70–99)
GLUCOSE SERPL-MCNC: 113 MG/DL — HIGH (ref 70–99)
GLUCOSE SERPL-MCNC: 120 MG/DL — HIGH (ref 70–99)
GLUCOSE SERPL-MCNC: 134 MG/DL — HIGH (ref 70–99)
GLUCOSE UR QL: NEGATIVE MG/DL — SIGNIFICANT CHANGE UP
HCT VFR BLD CALC: 33.6 % — LOW (ref 39–50)
HCT VFR BLD CALC: 33.7 % — LOW (ref 39–50)
HCT VFR BLD CALC: 34 % — LOW (ref 39–50)
HCT VFR BLD CALC: 34.5 % — LOW (ref 39–50)
HGB BLD-MCNC: 11.3 G/DL — LOW (ref 13–17)
HGB BLD-MCNC: 11.4 G/DL — LOW (ref 13–17)
HGB BLD-MCNC: 11.5 G/DL — LOW (ref 13–17)
HGB BLD-MCNC: 11.6 G/DL — LOW (ref 13–17)
IMM GRANULOCYTES # BLD AUTO: 0.58 K/UL — HIGH (ref 0–0.07)
IMM GRANULOCYTES # BLD AUTO: 0.63 K/UL — HIGH (ref 0–0.07)
IMM GRANULOCYTES # BLD AUTO: 0.69 K/UL — HIGH (ref 0–0.07)
IMM GRANULOCYTES # BLD AUTO: 0.82 K/UL — HIGH (ref 0–0.07)
IMM GRANULOCYTES NFR BLD AUTO: 4.6 % — HIGH (ref 0–0.9)
IMM GRANULOCYTES NFR BLD AUTO: 4.7 % — HIGH (ref 0–0.9)
IMM GRANULOCYTES NFR BLD AUTO: 4.8 % — HIGH (ref 0–0.9)
IMM GRANULOCYTES NFR BLD AUTO: 5.5 % — HIGH (ref 0–0.9)
INR BLD: 1.07 RATIO — SIGNIFICANT CHANGE UP (ref 0.85–1.16)
INR BLD: 1.08 RATIO — SIGNIFICANT CHANGE UP (ref 0.85–1.16)
INR BLD: 1.1 RATIO — SIGNIFICANT CHANGE UP (ref 0.85–1.16)
INR BLD: 1.15 RATIO — SIGNIFICANT CHANGE UP (ref 0.85–1.16)
KETONES UR QL: NEGATIVE MG/DL — SIGNIFICANT CHANGE UP
LEUKOCYTE ESTERASE UR-ACNC: NEGATIVE — SIGNIFICANT CHANGE UP
LIDOCAIN IGE QN: 48 U/L — SIGNIFICANT CHANGE UP (ref 22–51)
LIDOCAIN IGE QN: 50 U/L — SIGNIFICANT CHANGE UP (ref 22–51)
LIDOCAIN IGE QN: 55 U/L — HIGH (ref 22–51)
LIDOCAIN IGE QN: 56 U/L — HIGH (ref 22–51)
LYMPHOCYTES # BLD AUTO: 0.59 K/UL — LOW (ref 1–3.3)
LYMPHOCYTES # BLD AUTO: 0.71 K/UL — LOW (ref 1–3.3)
LYMPHOCYTES # BLD AUTO: 0.8 K/UL — LOW (ref 1–3.3)
LYMPHOCYTES # BLD AUTO: 1.38 K/UL — SIGNIFICANT CHANGE UP (ref 1–3.3)
LYMPHOCYTES # BLD MANUAL: 0.78 K/UL — LOW (ref 1–3.3)
LYMPHOCYTES NFR BLD AUTO: 11.2 % — LOW (ref 13–44)
LYMPHOCYTES NFR BLD AUTO: 4.3 % — LOW (ref 13–44)
LYMPHOCYTES NFR BLD AUTO: 4.7 % — LOW (ref 13–44)
LYMPHOCYTES NFR BLD AUTO: 5.5 % — LOW (ref 13–44)
LYMPHOCYTES NFR BLD MANUAL: 5.2 % — LOW (ref 13–44)
MAGNESIUM SERPL-MCNC: 1.9 MG/DL — SIGNIFICANT CHANGE UP (ref 1.6–2.6)
MAGNESIUM SERPL-MCNC: 2 MG/DL — SIGNIFICANT CHANGE UP (ref 1.6–2.6)
MAGNESIUM SERPL-MCNC: 2.1 MG/DL — SIGNIFICANT CHANGE UP (ref 1.6–2.6)
MAGNESIUM SERPL-MCNC: 2.1 MG/DL — SIGNIFICANT CHANGE UP (ref 1.8–2.6)
MANUAL METAMYELOCYTE #: 0.25 K/UL — HIGH (ref 0–0)
MANUAL MYELOCYTE #: 0.13 K/UL — HIGH (ref 0–0)
MANUAL NEUTROPHIL BANDS #: 0.13 K/UL — SIGNIFICANT CHANGE UP (ref 0–0.84)
MANUAL PROMYELOCYTE #: 0.25 K/UL — HIGH (ref 0–0)
MCHC RBC-ENTMCNC: 30.7 PG — SIGNIFICANT CHANGE UP (ref 27–34)
MCHC RBC-ENTMCNC: 30.9 PG — SIGNIFICANT CHANGE UP (ref 27–34)
MCHC RBC-ENTMCNC: 31.2 PG — SIGNIFICANT CHANGE UP (ref 27–34)
MCHC RBC-ENTMCNC: 31.4 PG — SIGNIFICANT CHANGE UP (ref 27–34)
MCHC RBC-ENTMCNC: 33.3 G/DL — SIGNIFICANT CHANGE UP (ref 32–36)
MCHC RBC-ENTMCNC: 33.6 G/DL — SIGNIFICANT CHANGE UP (ref 32–36)
MCHC RBC-ENTMCNC: 33.8 G/DL — SIGNIFICANT CHANGE UP (ref 32–36)
MCHC RBC-ENTMCNC: 34.1 G/DL — SIGNIFICANT CHANGE UP (ref 32–36)
MCV RBC AUTO: 91.3 FL — SIGNIFICANT CHANGE UP (ref 80–100)
MCV RBC AUTO: 91.4 FL — SIGNIFICANT CHANGE UP (ref 80–100)
MCV RBC AUTO: 92.7 FL — SIGNIFICANT CHANGE UP (ref 80–100)
MCV RBC AUTO: 92.8 FL — SIGNIFICANT CHANGE UP (ref 80–100)
METAMYELOCYTES # FLD: 1.7 % — HIGH (ref 0–0)
METAMYELOCYTES NFR BLD: 1.7 % — HIGH (ref 0–0)
METHOD TYPE: SIGNIFICANT CHANGE UP
MONOCYTES # BLD AUTO: 0.95 K/UL — HIGH (ref 0–0.9)
MONOCYTES # BLD AUTO: 1.05 K/UL — HIGH (ref 0–0.9)
MONOCYTES # BLD AUTO: 1.05 K/UL — HIGH (ref 0–0.9)
MONOCYTES # BLD AUTO: 1.12 K/UL — HIGH (ref 0–0.9)
MONOCYTES # BLD MANUAL: 0.78 K/UL — SIGNIFICANT CHANGE UP (ref 0–0.9)
MONOCYTES NFR BLD AUTO: 6.9 % — SIGNIFICANT CHANGE UP (ref 2–14)
MONOCYTES NFR BLD AUTO: 7 % — SIGNIFICANT CHANGE UP (ref 2–14)
MONOCYTES NFR BLD AUTO: 7.7 % — SIGNIFICANT CHANGE UP (ref 2–14)
MONOCYTES NFR BLD AUTO: 8.5 % — SIGNIFICANT CHANGE UP (ref 2–14)
MONOCYTES NFR BLD MANUAL: 5.2 % — SIGNIFICANT CHANGE UP (ref 2–14)
MYELOCYTES NFR BLD: 0.9 % — HIGH (ref 0–0)
NEUTROPHILS # BLD AUTO: 11.4 K/UL — HIGH (ref 1.8–7.4)
NEUTROPHILS # BLD AUTO: 11.71 K/UL — HIGH (ref 1.8–7.4)
NEUTROPHILS # BLD AUTO: 12.15 K/UL — HIGH (ref 1.8–7.4)
NEUTROPHILS # BLD AUTO: 9.09 K/UL — HIGH (ref 1.8–7.4)
NEUTROPHILS # BLD MANUAL: 12.62 K/UL — HIGH (ref 1.8–7.4)
NEUTROPHILS NFR BLD AUTO: 73.9 % — SIGNIFICANT CHANGE UP (ref 43–77)
NEUTROPHILS NFR BLD AUTO: 81 % — HIGH (ref 43–77)
NEUTROPHILS NFR BLD AUTO: 81.3 % — HIGH (ref 43–77)
NEUTROPHILS NFR BLD AUTO: 83.4 % — HIGH (ref 43–77)
NEUTROPHILS NFR BLD MANUAL: 84.4 % — HIGH (ref 43–77)
NEUTS BAND # BLD: 0.9 % — SIGNIFICANT CHANGE UP (ref 0–8)
NEUTS BAND NFR BLD: 0.9 % — SIGNIFICANT CHANGE UP (ref 0–8)
NITRITE UR-MCNC: NEGATIVE — SIGNIFICANT CHANGE UP
NRBC # BLD AUTO: 0 K/UL — SIGNIFICANT CHANGE UP (ref 0–0)
NRBC # FLD: 0 K/UL — SIGNIFICANT CHANGE UP (ref 0–0)
NRBC BLD AUTO-RTO: 0 /100 WBCS — SIGNIFICANT CHANGE UP (ref 0–0)
PH UR: 6 — SIGNIFICANT CHANGE UP (ref 5–8)
PHOSPHATE SERPL-MCNC: 3.2 MG/DL — SIGNIFICANT CHANGE UP (ref 2.4–4.7)
PHOSPHATE SERPL-MCNC: 3.3 MG/DL — SIGNIFICANT CHANGE UP (ref 2.4–4.7)
PHOSPHATE SERPL-MCNC: 3.4 MG/DL — SIGNIFICANT CHANGE UP (ref 2.4–4.7)
PHOSPHATE SERPL-MCNC: 4.2 MG/DL — SIGNIFICANT CHANGE UP (ref 2.4–4.7)
PLAT MORPH BLD: NORMAL — SIGNIFICANT CHANGE UP
PLATELET # BLD AUTO: 250 K/UL — SIGNIFICANT CHANGE UP (ref 150–400)
PLATELET # BLD AUTO: 250 K/UL — SIGNIFICANT CHANGE UP (ref 150–400)
PLATELET # BLD AUTO: 253 K/UL — SIGNIFICANT CHANGE UP (ref 150–400)
PLATELET # BLD AUTO: 258 K/UL — SIGNIFICANT CHANGE UP (ref 150–400)
PMV BLD: 8.8 FL — SIGNIFICANT CHANGE UP (ref 7–13)
PMV BLD: 8.9 FL — SIGNIFICANT CHANGE UP (ref 7–13)
POTASSIUM SERPL-MCNC: 3.8 MMOL/L — SIGNIFICANT CHANGE UP (ref 3.5–5.3)
POTASSIUM SERPL-MCNC: 3.9 MMOL/L — SIGNIFICANT CHANGE UP (ref 3.5–5.3)
POTASSIUM SERPL-MCNC: 4.3 MMOL/L — SIGNIFICANT CHANGE UP (ref 3.5–5.3)
POTASSIUM SERPL-MCNC: 4.3 MMOL/L — SIGNIFICANT CHANGE UP (ref 3.5–5.3)
POTASSIUM SERPL-SCNC: 3.8 MMOL/L — SIGNIFICANT CHANGE UP (ref 3.5–5.3)
POTASSIUM SERPL-SCNC: 3.9 MMOL/L — SIGNIFICANT CHANGE UP (ref 3.5–5.3)
POTASSIUM SERPL-SCNC: 4.3 MMOL/L — SIGNIFICANT CHANGE UP (ref 3.5–5.3)
POTASSIUM SERPL-SCNC: 4.3 MMOL/L — SIGNIFICANT CHANGE UP (ref 3.5–5.3)
PROMYELOCYTES # FLD: 1.7 % — HIGH (ref 0–0)
PROMYELOCYTES NFR BLD: 1.7 % — HIGH (ref 0–0)
PROT SERPL-MCNC: 6.2 G/DL — LOW (ref 6.6–8.7)
PROT SERPL-MCNC: 6.5 G/DL — LOW (ref 6.6–8.7)
PROT SERPL-MCNC: 6.5 G/DL — LOW (ref 6.6–8.7)
PROT SERPL-MCNC: 6.7 G/DL — SIGNIFICANT CHANGE UP (ref 6.6–8.7)
PROT UR-MCNC: SIGNIFICANT CHANGE UP MG/DL
PROTHROM AB SERPL-ACNC: 12.1 SEC — SIGNIFICANT CHANGE UP (ref 9.9–13.4)
PROTHROM AB SERPL-ACNC: 12.2 SEC — SIGNIFICANT CHANGE UP (ref 9.9–13.4)
PROTHROM AB SERPL-ACNC: 12.4 SEC — SIGNIFICANT CHANGE UP (ref 9.9–13.4)
PROTHROM AB SERPL-ACNC: 13 SEC — SIGNIFICANT CHANGE UP (ref 9.9–13.4)
RBC # BLD: 3.63 M/UL — LOW (ref 4.2–5.8)
RBC # BLD: 3.68 M/UL — LOW (ref 4.2–5.8)
RBC # BLD: 3.72 M/UL — LOW (ref 4.2–5.8)
RBC # BLD: 3.72 M/UL — LOW (ref 4.2–5.8)
RBC # FLD: 14.6 % — HIGH (ref 10.3–14.5)
RBC BLD AUTO: NORMAL — SIGNIFICANT CHANGE UP
SODIUM SERPL-SCNC: 135 MMOL/L — SIGNIFICANT CHANGE UP (ref 135–145)
SP GR SPEC: >1.03 — HIGH (ref 1–1.03)
SPECIMEN SOURCE: SIGNIFICANT CHANGE UP
SPECIMEN SOURCE: SIGNIFICANT CHANGE UP
TROPONIN T, HIGH SENSITIVITY RESULT: 10 NG/L — SIGNIFICANT CHANGE UP (ref 0–51)
TROPONIN T, HIGH SENSITIVITY RESULT: 14 NG/L — SIGNIFICANT CHANGE UP (ref 0–51)
TROPONIN T, HIGH SENSITIVITY RESULT: 9 NG/L — SIGNIFICANT CHANGE UP (ref 0–51)
TROPONIN T, HIGH SENSITIVITY RESULT: 9 NG/L — SIGNIFICANT CHANGE UP (ref 0–51)
UROBILINOGEN FLD QL: 4 MG/DL (ref 0.2–1)
WBC # BLD: 12.31 K/UL — HIGH (ref 3.8–10.5)
WBC # BLD: 13.67 K/UL — HIGH (ref 3.8–10.5)
WBC # BLD: 14.46 K/UL — HIGH (ref 3.8–10.5)
WBC # BLD: 14.95 K/UL — HIGH (ref 3.8–10.5)
WBC # FLD AUTO: 12.31 K/UL — HIGH (ref 3.8–10.5)
WBC # FLD AUTO: 13.67 K/UL — HIGH (ref 3.8–10.5)
WBC # FLD AUTO: 14.46 K/UL — HIGH (ref 3.8–10.5)
WBC # FLD AUTO: 14.95 K/UL — HIGH (ref 3.8–10.5)

## 2025-05-16 PROCEDURE — 99233 SBSQ HOSP IP/OBS HIGH 50: CPT | Mod: GC

## 2025-05-16 RX ADMIN — Medication 25 GRAM(S): at 02:31

## 2025-05-16 RX ADMIN — Medication 1 APPLICATION(S): at 17:38

## 2025-05-16 RX ADMIN — Medication 2 DROP(S): at 08:05

## 2025-05-16 RX ADMIN — Medication 100 MILLIGRAM(S): at 02:31

## 2025-05-16 RX ADMIN — Medication 25 GRAM(S): at 17:34

## 2025-05-16 RX ADMIN — Medication 2 DROP(S): at 10:06

## 2025-05-16 RX ADMIN — Medication 2 DROP(S): at 06:21

## 2025-05-16 RX ADMIN — Medication 2 DROP(S): at 14:04

## 2025-05-16 RX ADMIN — Medication 15 MILLILITER(S): at 06:28

## 2025-05-16 RX ADMIN — IPRATROPIUM BROMIDE AND ALBUTEROL SULFATE 3 MILLILITER(S): .5; 2.5 SOLUTION RESPIRATORY (INHALATION) at 08:31

## 2025-05-16 RX ADMIN — LEVETIRACETAM 1000 MILLIGRAM(S): 10 INJECTION, SOLUTION INTRAVENOUS at 06:29

## 2025-05-16 RX ADMIN — Medication 2 DROP(S): at 02:27

## 2025-05-16 RX ADMIN — Medication 2 DROP(S): at 04:42

## 2025-05-16 RX ADMIN — ENOXAPARIN SODIUM 40 MILLIGRAM(S): 100 INJECTION SUBCUTANEOUS at 06:30

## 2025-05-16 RX ADMIN — Medication 2 DROP(S): at 12:18

## 2025-05-16 RX ADMIN — Medication 2 DROP(S): at 17:38

## 2025-05-16 RX ADMIN — Medication 2 DROP(S): at 00:33

## 2025-05-16 RX ADMIN — Medication 2 DROP(S): at 22:11

## 2025-05-16 RX ADMIN — IPRATROPIUM BROMIDE AND ALBUTEROL SULFATE 3 MILLILITER(S): .5; 2.5 SOLUTION RESPIRATORY (INHALATION) at 03:53

## 2025-05-16 RX ADMIN — Medication 15 MILLILITER(S): at 17:34

## 2025-05-16 RX ADMIN — Medication 25 GRAM(S): at 11:06

## 2025-05-16 RX ADMIN — LEVETIRACETAM 1000 MILLIGRAM(S): 10 INJECTION, SOLUTION INTRAVENOUS at 17:34

## 2025-05-16 RX ADMIN — Medication 40 MILLIGRAM(S): at 11:07

## 2025-05-16 RX ADMIN — Medication 1 APPLICATION(S): at 06:26

## 2025-05-16 RX ADMIN — Medication 2 DROP(S): at 20:33

## 2025-05-17 VITALS
RESPIRATION RATE: 22 BRPM | TEMPERATURE: 98 F | SYSTOLIC BLOOD PRESSURE: 113 MMHG | HEART RATE: 66 BPM | DIASTOLIC BLOOD PRESSURE: 69 MMHG

## 2025-05-17 LAB
-  PIPERACILLIN/TAZOBACTAM: SIGNIFICANT CHANGE UP
ABO RH CONFIRMATION: SIGNIFICANT CHANGE UP
ALBUMIN SERPL ELPH-MCNC: 3.2 G/DL — LOW (ref 3.3–5.2)
ALBUMIN SERPL ELPH-MCNC: 3.4 G/DL — SIGNIFICANT CHANGE UP (ref 3.3–5.2)
ALBUMIN SERPL ELPH-MCNC: 3.5 G/DL — SIGNIFICANT CHANGE UP (ref 3.3–5.2)
ALP SERPL-CCNC: 127 U/L — HIGH (ref 40–120)
ALP SERPL-CCNC: 136 U/L — HIGH (ref 40–120)
ALP SERPL-CCNC: 149 U/L — HIGH (ref 40–120)
ALT FLD-CCNC: 49 U/L — HIGH
ALT FLD-CCNC: 49 U/L — HIGH
ALT FLD-CCNC: 50 U/L — HIGH
AMYLASE P1 CFR SERPL: 53 U/L — SIGNIFICANT CHANGE UP (ref 36–128)
AMYLASE P1 CFR SERPL: 53 U/L — SIGNIFICANT CHANGE UP (ref 36–128)
AMYLASE P1 CFR SERPL: 55 U/L — SIGNIFICANT CHANGE UP (ref 36–128)
ANION GAP SERPL CALC-SCNC: 14 MMOL/L — SIGNIFICANT CHANGE UP (ref 5–17)
ANION GAP SERPL CALC-SCNC: 16 MMOL/L — SIGNIFICANT CHANGE UP (ref 5–17)
ANION GAP SERPL CALC-SCNC: 17 MMOL/L — SIGNIFICANT CHANGE UP (ref 5–17)
APPEARANCE UR: CLEAR — SIGNIFICANT CHANGE UP
APTT BLD: 24 SEC — LOW (ref 26.1–36.8)
APTT BLD: 24.3 SEC — LOW (ref 26.1–36.8)
APTT BLD: 24.6 SEC — LOW (ref 26.1–36.8)
AST SERPL-CCNC: 38 U/L — SIGNIFICANT CHANGE UP
AST SERPL-CCNC: 41 U/L — HIGH
AST SERPL-CCNC: 43 U/L — HIGH
BACTERIA # UR AUTO: ABNORMAL /HPF
BASOPHILS # BLD AUTO: 0.07 K/UL — SIGNIFICANT CHANGE UP (ref 0–0.2)
BASOPHILS # BLD AUTO: 0.08 K/UL — SIGNIFICANT CHANGE UP (ref 0–0.2)
BASOPHILS # BLD AUTO: 0.09 K/UL — SIGNIFICANT CHANGE UP (ref 0–0.2)
BASOPHILS NFR BLD AUTO: 0.4 % — SIGNIFICANT CHANGE UP (ref 0–2)
BASOPHILS NFR BLD AUTO: 0.6 % — SIGNIFICANT CHANGE UP (ref 0–2)
BASOPHILS NFR BLD AUTO: 0.6 % — SIGNIFICANT CHANGE UP (ref 0–2)
BILIRUB DIRECT SERPL-MCNC: 0.4 MG/DL — HIGH (ref 0–0.3)
BILIRUB DIRECT SERPL-MCNC: 0.4 MG/DL — HIGH (ref 0–0.3)
BILIRUB DIRECT SERPL-MCNC: 0.5 MG/DL — HIGH (ref 0–0.3)
BILIRUB INDIRECT FLD-MCNC: 0.5 MG/DL — SIGNIFICANT CHANGE UP (ref 0.2–1)
BILIRUB INDIRECT FLD-MCNC: 0.6 MG/DL — SIGNIFICANT CHANGE UP (ref 0.2–1)
BILIRUB INDIRECT FLD-MCNC: 0.6 MG/DL — SIGNIFICANT CHANGE UP (ref 0.2–1)
BILIRUB SERPL-MCNC: 0.9 MG/DL — SIGNIFICANT CHANGE UP (ref 0.4–2)
BILIRUB SERPL-MCNC: 0.9 MG/DL — SIGNIFICANT CHANGE UP (ref 0.4–2)
BILIRUB SERPL-MCNC: 1.1 MG/DL — SIGNIFICANT CHANGE UP (ref 0.4–2)
BILIRUB UR-MCNC: ABNORMAL
BUN SERPL-MCNC: 17.5 MG/DL — SIGNIFICANT CHANGE UP (ref 8–20)
BUN SERPL-MCNC: 19.6 MG/DL — SIGNIFICANT CHANGE UP (ref 8–20)
BUN SERPL-MCNC: 20.2 MG/DL — HIGH (ref 8–20)
CALCIUM SERPL-MCNC: 8.7 MG/DL — SIGNIFICANT CHANGE UP (ref 8.4–10.5)
CALCIUM SERPL-MCNC: 8.8 MG/DL — SIGNIFICANT CHANGE UP (ref 8.4–10.5)
CALCIUM SERPL-MCNC: 8.9 MG/DL — SIGNIFICANT CHANGE UP (ref 8.4–10.5)
CHLORIDE SERPL-SCNC: 101 MMOL/L — SIGNIFICANT CHANGE UP (ref 96–108)
CHLORIDE SERPL-SCNC: 102 MMOL/L — SIGNIFICANT CHANGE UP (ref 96–108)
CHLORIDE SERPL-SCNC: 99 MMOL/L — SIGNIFICANT CHANGE UP (ref 96–108)
CK SERPL-CCNC: 71 U/L — SIGNIFICANT CHANGE UP (ref 30–200)
CK SERPL-CCNC: 78 U/L — SIGNIFICANT CHANGE UP (ref 30–200)
CK SERPL-CCNC: 93 U/L — SIGNIFICANT CHANGE UP (ref 30–200)
CO2 SERPL-SCNC: 22 MMOL/L — SIGNIFICANT CHANGE UP (ref 22–29)
COLOR SPEC: SIGNIFICANT CHANGE UP
CREAT SERPL-MCNC: 0.51 MG/DL — SIGNIFICANT CHANGE UP (ref 0.5–1.3)
CREAT SERPL-MCNC: 0.54 MG/DL — SIGNIFICANT CHANGE UP (ref 0.5–1.3)
CREAT SERPL-MCNC: 0.57 MG/DL — SIGNIFICANT CHANGE UP (ref 0.5–1.3)
CULTURE RESULTS: ABNORMAL
DIFF PNL FLD: NEGATIVE — SIGNIFICANT CHANGE UP
EGFR: 126 ML/MIN/1.73M2 — SIGNIFICANT CHANGE UP
EGFR: 126 ML/MIN/1.73M2 — SIGNIFICANT CHANGE UP
EGFR: 128 ML/MIN/1.73M2 — SIGNIFICANT CHANGE UP
EGFR: 128 ML/MIN/1.73M2 — SIGNIFICANT CHANGE UP
EGFR: 131 ML/MIN/1.73M2 — SIGNIFICANT CHANGE UP
EGFR: 131 ML/MIN/1.73M2 — SIGNIFICANT CHANGE UP
EOSINOPHIL # BLD AUTO: 0.1 K/UL — SIGNIFICANT CHANGE UP (ref 0–0.5)
EOSINOPHIL # BLD AUTO: 0.13 K/UL — SIGNIFICANT CHANGE UP (ref 0–0.5)
EOSINOPHIL # BLD AUTO: 0.14 K/UL — SIGNIFICANT CHANGE UP (ref 0–0.5)
EOSINOPHIL NFR BLD AUTO: 0.6 % — SIGNIFICANT CHANGE UP (ref 0–6)
EOSINOPHIL NFR BLD AUTO: 0.8 % — SIGNIFICANT CHANGE UP (ref 0–6)
EOSINOPHIL NFR BLD AUTO: 0.9 % — SIGNIFICANT CHANGE UP (ref 0–6)
GAS PNL BLDA: SIGNIFICANT CHANGE UP
GLUCOSE SERPL-MCNC: 105 MG/DL — HIGH (ref 70–99)
GLUCOSE SERPL-MCNC: 107 MG/DL — HIGH (ref 70–99)
GLUCOSE SERPL-MCNC: 114 MG/DL — HIGH (ref 70–99)
GLUCOSE UR QL: NEGATIVE MG/DL — SIGNIFICANT CHANGE UP
HCT VFR BLD CALC: 34.9 % — LOW (ref 39–50)
HCT VFR BLD CALC: 34.9 % — LOW (ref 39–50)
HCT VFR BLD CALC: 35.7 % — LOW (ref 39–50)
HGB BLD-MCNC: 11.6 G/DL — LOW (ref 13–17)
HGB BLD-MCNC: 11.8 G/DL — LOW (ref 13–17)
HGB BLD-MCNC: 11.9 G/DL — LOW (ref 13–17)
IMM GRANULOCYTES # BLD AUTO: 0.76 K/UL — HIGH (ref 0–0.07)
IMM GRANULOCYTES # BLD AUTO: 0.81 K/UL — HIGH (ref 0–0.07)
IMM GRANULOCYTES # BLD AUTO: 0.82 K/UL — HIGH (ref 0–0.07)
IMM GRANULOCYTES NFR BLD AUTO: 4.9 % — HIGH (ref 0–0.9)
IMM GRANULOCYTES NFR BLD AUTO: 4.9 % — HIGH (ref 0–0.9)
IMM GRANULOCYTES NFR BLD AUTO: 5.7 % — HIGH (ref 0–0.9)
INR BLD: 1.1 RATIO — SIGNIFICANT CHANGE UP (ref 0.85–1.16)
INR BLD: 1.16 RATIO — SIGNIFICANT CHANGE UP (ref 0.85–1.16)
INR BLD: 1.17 RATIO — HIGH (ref 0.85–1.16)
KETONES UR QL: ABNORMAL MG/DL
LEUKOCYTE ESTERASE UR-ACNC: ABNORMAL
LIDOCAIN IGE QN: 58 U/L — HIGH (ref 22–51)
LIDOCAIN IGE QN: 61 U/L — HIGH (ref 22–51)
LIDOCAIN IGE QN: 61 U/L — HIGH (ref 22–51)
LYMPHOCYTES # BLD AUTO: 1.11 K/UL — SIGNIFICANT CHANGE UP (ref 1–3.3)
LYMPHOCYTES # BLD AUTO: 1.35 K/UL — SIGNIFICANT CHANGE UP (ref 1–3.3)
LYMPHOCYTES # BLD AUTO: 1.43 K/UL — SIGNIFICANT CHANGE UP (ref 1–3.3)
LYMPHOCYTES NFR BLD AUTO: 10 % — LOW (ref 13–44)
LYMPHOCYTES NFR BLD AUTO: 7.2 % — LOW (ref 13–44)
LYMPHOCYTES NFR BLD AUTO: 8.1 % — LOW (ref 13–44)
MAGNESIUM SERPL-MCNC: 2 MG/DL — SIGNIFICANT CHANGE UP (ref 1.8–2.6)
MAGNESIUM SERPL-MCNC: 2.1 MG/DL — SIGNIFICANT CHANGE UP (ref 1.6–2.6)
MAGNESIUM SERPL-MCNC: 2.1 MG/DL — SIGNIFICANT CHANGE UP (ref 1.8–2.6)
MCHC RBC-ENTMCNC: 30.4 PG — SIGNIFICANT CHANGE UP (ref 27–34)
MCHC RBC-ENTMCNC: 30.8 PG — SIGNIFICANT CHANGE UP (ref 27–34)
MCHC RBC-ENTMCNC: 31.1 PG — SIGNIFICANT CHANGE UP (ref 27–34)
MCHC RBC-ENTMCNC: 33.2 G/DL — SIGNIFICANT CHANGE UP (ref 32–36)
MCHC RBC-ENTMCNC: 33.3 G/DL — SIGNIFICANT CHANGE UP (ref 32–36)
MCHC RBC-ENTMCNC: 33.8 G/DL — SIGNIFICANT CHANGE UP (ref 32–36)
MCV RBC AUTO: 91.6 FL — SIGNIFICANT CHANGE UP (ref 80–100)
MCV RBC AUTO: 92.1 FL — SIGNIFICANT CHANGE UP (ref 80–100)
MCV RBC AUTO: 92.5 FL — SIGNIFICANT CHANGE UP (ref 80–100)
METHOD TYPE: SIGNIFICANT CHANGE UP
MONOCYTES # BLD AUTO: 0.81 K/UL — SIGNIFICANT CHANGE UP (ref 0–0.9)
MONOCYTES # BLD AUTO: 1.05 K/UL — HIGH (ref 0–0.9)
MONOCYTES # BLD AUTO: 1.16 K/UL — HIGH (ref 0–0.9)
MONOCYTES NFR BLD AUTO: 5.7 % — SIGNIFICANT CHANGE UP (ref 2–14)
MONOCYTES NFR BLD AUTO: 6.3 % — SIGNIFICANT CHANGE UP (ref 2–14)
MONOCYTES NFR BLD AUTO: 7.5 % — SIGNIFICANT CHANGE UP (ref 2–14)
NEUTROPHILS # BLD AUTO: 11.04 K/UL — HIGH (ref 1.8–7.4)
NEUTROPHILS # BLD AUTO: 12.17 K/UL — HIGH (ref 1.8–7.4)
NEUTROPHILS # BLD AUTO: 13.24 K/UL — HIGH (ref 1.8–7.4)
NEUTROPHILS NFR BLD AUTO: 77.1 % — HIGH (ref 43–77)
NEUTROPHILS NFR BLD AUTO: 79.2 % — HIGH (ref 43–77)
NEUTROPHILS NFR BLD AUTO: 79.5 % — HIGH (ref 43–77)
NITRITE UR-MCNC: NEGATIVE — SIGNIFICANT CHANGE UP
NRBC # BLD AUTO: 0 K/UL — SIGNIFICANT CHANGE UP (ref 0–0)
NRBC # FLD: 0 K/UL — SIGNIFICANT CHANGE UP (ref 0–0)
NRBC BLD AUTO-RTO: 0 /100 WBCS — SIGNIFICANT CHANGE UP (ref 0–0)
ORGANISM # SPEC MICROSCOPIC CNT: ABNORMAL
ORGANISM # SPEC MICROSCOPIC CNT: ABNORMAL
ORGANISM # SPEC MICROSCOPIC CNT: SIGNIFICANT CHANGE UP
PH UR: 7.5 — SIGNIFICANT CHANGE UP (ref 5–8)
PHOSPHATE SERPL-MCNC: 2.5 MG/DL — SIGNIFICANT CHANGE UP (ref 2.4–4.7)
PHOSPHATE SERPL-MCNC: 2.8 MG/DL — SIGNIFICANT CHANGE UP (ref 2.4–4.7)
PHOSPHATE SERPL-MCNC: 3.1 MG/DL — SIGNIFICANT CHANGE UP (ref 2.4–4.7)
PLATELET # BLD AUTO: 271 K/UL — SIGNIFICANT CHANGE UP (ref 150–400)
PLATELET # BLD AUTO: 280 K/UL — SIGNIFICANT CHANGE UP (ref 150–400)
PLATELET # BLD AUTO: 287 K/UL — SIGNIFICANT CHANGE UP (ref 150–400)
PMV BLD: 8.8 FL — SIGNIFICANT CHANGE UP (ref 7–13)
PMV BLD: 8.9 FL — SIGNIFICANT CHANGE UP (ref 7–13)
PMV BLD: 9.1 FL — SIGNIFICANT CHANGE UP (ref 7–13)
POTASSIUM SERPL-MCNC: 3.6 MMOL/L — SIGNIFICANT CHANGE UP (ref 3.5–5.3)
POTASSIUM SERPL-MCNC: 4 MMOL/L — SIGNIFICANT CHANGE UP (ref 3.5–5.3)
POTASSIUM SERPL-MCNC: 4.2 MMOL/L — SIGNIFICANT CHANGE UP (ref 3.5–5.3)
POTASSIUM SERPL-SCNC: 3.6 MMOL/L — SIGNIFICANT CHANGE UP (ref 3.5–5.3)
POTASSIUM SERPL-SCNC: 4 MMOL/L — SIGNIFICANT CHANGE UP (ref 3.5–5.3)
POTASSIUM SERPL-SCNC: 4.2 MMOL/L — SIGNIFICANT CHANGE UP (ref 3.5–5.3)
PROT SERPL-MCNC: 6.2 G/DL — LOW (ref 6.6–8.7)
PROT SERPL-MCNC: 6.7 G/DL — SIGNIFICANT CHANGE UP (ref 6.6–8.7)
PROT SERPL-MCNC: 6.8 G/DL — SIGNIFICANT CHANGE UP (ref 6.6–8.7)
PROT UR-MCNC: 30 MG/DL
PROTHROM AB SERPL-ACNC: 12.8 SEC — SIGNIFICANT CHANGE UP (ref 9.9–13.4)
PROTHROM AB SERPL-ACNC: 13.4 SEC — SIGNIFICANT CHANGE UP (ref 9.9–13.4)
PROTHROM AB SERPL-ACNC: 13.6 SEC — HIGH (ref 9.9–13.4)
RBC # BLD: 3.79 M/UL — LOW (ref 4.2–5.8)
RBC # BLD: 3.81 M/UL — LOW (ref 4.2–5.8)
RBC # BLD: 3.86 M/UL — LOW (ref 4.2–5.8)
RBC # FLD: 14.6 % — HIGH (ref 10.3–14.5)
RBC # FLD: 14.7 % — HIGH (ref 10.3–14.5)
RBC # FLD: 14.7 % — HIGH (ref 10.3–14.5)
RBC CASTS # UR COMP ASSIST: 4 /HPF — SIGNIFICANT CHANGE UP (ref 0–4)
SODIUM SERPL-SCNC: 137 MMOL/L — SIGNIFICANT CHANGE UP (ref 135–145)
SODIUM SERPL-SCNC: 138 MMOL/L — SIGNIFICANT CHANGE UP (ref 135–145)
SODIUM SERPL-SCNC: 139 MMOL/L — SIGNIFICANT CHANGE UP (ref 135–145)
SP GR SPEC: 1.03 — SIGNIFICANT CHANGE UP (ref 1–1.03)
SPECIMEN SOURCE: SIGNIFICANT CHANGE UP
SQUAMOUS # UR AUTO: 0 /HPF — SIGNIFICANT CHANGE UP (ref 0–5)
TROPONIN T, HIGH SENSITIVITY RESULT: 10 NG/L — SIGNIFICANT CHANGE UP (ref 0–51)
TROPONIN T, HIGH SENSITIVITY RESULT: 13 NG/L — SIGNIFICANT CHANGE UP (ref 0–51)
TROPONIN T, HIGH SENSITIVITY RESULT: 17 NG/L — SIGNIFICANT CHANGE UP (ref 0–51)
UROBILINOGEN FLD QL: >=8 MG/DL (ref 0.2–1)
WBC # BLD: 14.3 K/UL — HIGH (ref 3.8–10.5)
WBC # BLD: 15.39 K/UL — HIGH (ref 3.8–10.5)
WBC # BLD: 16.67 K/UL — HIGH (ref 3.8–10.5)
WBC # FLD AUTO: 14.3 K/UL — HIGH (ref 3.8–10.5)
WBC # FLD AUTO: 15.39 K/UL — HIGH (ref 3.8–10.5)
WBC # FLD AUTO: 16.67 K/UL — HIGH (ref 3.8–10.5)
WBC UR QL: 1 /HPF — SIGNIFICANT CHANGE UP (ref 0–5)

## 2025-05-17 PROCEDURE — 99233 SBSQ HOSP IP/OBS HIGH 50: CPT | Mod: GC

## 2025-05-17 RX ORDER — HYDROMORPHONE/SOD CHLOR,ISO/PF 2 MG/10 ML
50 SYRINGE (ML) INJECTION ONCE
Refills: 0 | Status: DISCONTINUED | OUTPATIENT
Start: 2025-05-17 | End: 2025-05-17

## 2025-05-17 RX ORDER — HEPARIN SODIUM 1000 [USP'U]/ML
30000 INJECTION INTRAVENOUS; SUBCUTANEOUS ONCE
Refills: 0 | Status: DISCONTINUED | OUTPATIENT
Start: 2025-05-17 | End: 2025-05-17

## 2025-05-17 RX ORDER — MIDAZOLAM IN 0.9 % SOD.CHLORID 1 MG/ML
3 PLASTIC BAG, INJECTION (ML) INTRAVENOUS ONCE
Refills: 0 | Status: DISCONTINUED | OUTPATIENT
Start: 2025-05-17 | End: 2025-05-17

## 2025-05-17 RX ORDER — HYDROMORPHONE/SOD CHLOR,ISO/PF 2 MG/10 ML
2 SYRINGE (ML) INJECTION ONCE
Refills: 0 | Status: DISCONTINUED | OUTPATIENT
Start: 2025-05-17 | End: 2025-05-17

## 2025-05-17 RX ORDER — HYDROMORPHONE/SOD CHLOR,ISO/PF 2 MG/10 ML
10 SYRINGE (ML) INJECTION ONCE
Refills: 0 | Status: DISCONTINUED | OUTPATIENT
Start: 2025-05-17 | End: 2025-05-17

## 2025-05-17 RX ORDER — KETAMINE HCL IN 0.9 % NACL 50 MG/5 ML
100 SYRINGE (ML) INTRAVENOUS ONCE
Refills: 0 | Status: DISCONTINUED | OUTPATIENT
Start: 2025-05-17 | End: 2025-05-17

## 2025-05-17 RX ORDER — GLYCOPYRROLATE 0.2 MG/ML
0.2 INJECTION INTRAMUSCULAR; INTRAVENOUS ONCE
Refills: 0 | Status: COMPLETED | OUTPATIENT
Start: 2025-05-17 | End: 2025-05-17

## 2025-05-17 RX ORDER — MIDAZOLAM IN 0.9 % SOD.CHLORID 1 MG/ML
10 PLASTIC BAG, INJECTION (ML) INTRAVENOUS ONCE
Refills: 0 | Status: DISCONTINUED | OUTPATIENT
Start: 2025-05-17 | End: 2025-05-17

## 2025-05-17 RX ORDER — HEPARIN SODIUM 1000 [USP'U]/ML
30000 INJECTION INTRAVENOUS; SUBCUTANEOUS ONCE
Refills: 0 | Status: COMPLETED | OUTPATIENT
Start: 2025-05-17 | End: 2025-05-17

## 2025-05-17 RX ORDER — SOD PHOS DI, MONO/K PHOS MONO 250 MG
2 TABLET ORAL ONCE
Refills: 0 | Status: COMPLETED | OUTPATIENT
Start: 2025-05-17 | End: 2025-05-17

## 2025-05-17 RX ORDER — MIDAZOLAM IN 0.9 % SOD.CHLORID 1 MG/ML
2 PLASTIC BAG, INJECTION (ML) INTRAVENOUS ONCE
Refills: 0 | Status: DISCONTINUED | OUTPATIENT
Start: 2025-05-17 | End: 2025-05-17

## 2025-05-17 RX ORDER — MIDAZOLAM IN 0.9 % SOD.CHLORID 1 MG/ML
50 PLASTIC BAG, INJECTION (ML) INTRAVENOUS ONCE
Refills: 0 | Status: DISCONTINUED | OUTPATIENT
Start: 2025-05-17 | End: 2025-05-17

## 2025-05-17 RX ADMIN — Medication 25 GRAM(S): at 10:10

## 2025-05-17 RX ADMIN — HEPARIN SODIUM 30000 UNIT(S): 1000 INJECTION INTRAVENOUS; SUBCUTANEOUS at 16:13

## 2025-05-17 RX ADMIN — Medication 10 MILLIGRAM(S): at 16:29

## 2025-05-17 RX ADMIN — Medication 3 MILLIGRAM(S): at 16:20

## 2025-05-17 RX ADMIN — Medication 1 APPLICATION(S): at 05:04

## 2025-05-17 RX ADMIN — Medication 2 PACKET(S): at 06:21

## 2025-05-17 RX ADMIN — Medication 25 GRAM(S): at 02:28

## 2025-05-17 RX ADMIN — Medication 2 DROP(S): at 04:32

## 2025-05-17 RX ADMIN — Medication 2 DROP(S): at 00:29

## 2025-05-17 RX ADMIN — ENOXAPARIN SODIUM 40 MILLIGRAM(S): 100 INJECTION SUBCUTANEOUS at 05:03

## 2025-05-17 RX ADMIN — LEVETIRACETAM 1000 MILLIGRAM(S): 10 INJECTION, SOLUTION INTRAVENOUS at 05:03

## 2025-05-17 RX ADMIN — GLYCOPYRROLATE 0.2 MILLIGRAM(S): 0.2 INJECTION INTRAMUSCULAR; INTRAVENOUS at 10:38

## 2025-05-17 RX ADMIN — Medication 2 DROP(S): at 02:11

## 2025-05-17 RX ADMIN — Medication 2 DROP(S): at 06:08

## 2025-05-17 RX ADMIN — Medication 2 DROP(S): at 10:05

## 2025-05-17 RX ADMIN — Medication 2 DROP(S): at 11:59

## 2025-05-17 RX ADMIN — Medication 2 DROP(S): at 08:41

## 2025-05-17 RX ADMIN — Medication 15 MILLILITER(S): at 05:03

## 2025-05-17 RX ADMIN — Medication 100 MILLIGRAM(S): at 02:29

## 2025-05-17 RX ADMIN — Medication 40 MILLIEQUIVALENT(S): at 06:21

## 2025-05-17 RX ADMIN — Medication 2 MILLIGRAM(S): at 16:20

## 2025-05-17 RX ADMIN — GLYCOPYRROLATE 0.2 MILLIGRAM(S): 0.2 INJECTION INTRAMUSCULAR; INTRAVENOUS at 14:48

## 2025-06-10 RX ORDER — TAMSULOSIN HYDROCHLORIDE 0.4 MG/1
1 CAPSULE ORAL
Refills: 0 | DISCHARGE

## 2025-06-10 RX ORDER — ARIPIPRAZOLE 2 MG/1
1 TABLET ORAL
Refills: 0 | DISCHARGE

## 2025-06-10 RX ORDER — CLOZAPINE 100 MG
175 TABLET ORAL
Refills: 0 | DISCHARGE

## 2025-06-10 RX ORDER — CLONAZEPAM 0.5 MG/1
1 TABLET ORAL
Refills: 0 | DISCHARGE

## 2025-06-10 RX ORDER — SENNA 187 MG
1 TABLET ORAL
Refills: 0 | DISCHARGE

## 2025-06-10 RX ORDER — RISPERIDONE 4 MG
3.5 TABLET ORAL
Refills: 0 | DISCHARGE

## 2025-06-10 RX ORDER — FLUTICASONE PROPIONATE 220 UG/1
2 AEROSOL, METERED RESPIRATORY (INHALATION)
Refills: 0 | DISCHARGE

## 2025-06-10 RX ORDER — POLYETHYLENE GLYCOL 3350 17 G/17G
1 POWDER, FOR SOLUTION ORAL
Refills: 0 | DISCHARGE

## 2025-06-10 RX ORDER — BENZTROPINE MESYLATE 2 MG
1 TABLET ORAL
Refills: 0 | DISCHARGE

## 2025-06-11 LAB
CULTURE RESULTS: ABNORMAL
SPECIMEN SOURCE: SIGNIFICANT CHANGE UP

## 2025-07-02 LAB
CULTURE RESULTS: SIGNIFICANT CHANGE UP
SPECIMEN SOURCE: SIGNIFICANT CHANGE UP